# Patient Record
Sex: FEMALE | Race: WHITE | NOT HISPANIC OR LATINO | Employment: OTHER | URBAN - METROPOLITAN AREA
[De-identification: names, ages, dates, MRNs, and addresses within clinical notes are randomized per-mention and may not be internally consistent; named-entity substitution may affect disease eponyms.]

---

## 2017-01-31 ENCOUNTER — OFFICE VISIT (OUTPATIENT)
Dept: MEDICAL GROUP | Facility: PHYSICIAN GROUP | Age: 57
End: 2017-01-31
Payer: COMMERCIAL

## 2017-01-31 VITALS
TEMPERATURE: 99.3 F | OXYGEN SATURATION: 96 % | DIASTOLIC BLOOD PRESSURE: 78 MMHG | SYSTOLIC BLOOD PRESSURE: 98 MMHG | BODY MASS INDEX: 24.77 KG/M2 | RESPIRATION RATE: 12 BRPM | HEART RATE: 96 BPM | WEIGHT: 134.6 LBS | HEIGHT: 62 IN

## 2017-01-31 DIAGNOSIS — R10.12 LEFT UPPER QUADRANT PAIN: ICD-10-CM

## 2017-01-31 DIAGNOSIS — R00.2 HEART PALPITATIONS: ICD-10-CM

## 2017-01-31 DIAGNOSIS — E89.0 POSTOPERATIVE HYPOTHYROIDISM: ICD-10-CM

## 2017-01-31 DIAGNOSIS — Z85.850 HISTORY OF THYROID CANCER: ICD-10-CM

## 2017-01-31 DIAGNOSIS — R25.2 MUSCLE CRAMPS: ICD-10-CM

## 2017-01-31 DIAGNOSIS — R05.3 PERSISTENT COUGH FOR 3 WEEKS OR LONGER: ICD-10-CM

## 2017-01-31 DIAGNOSIS — R07.81 RIB PAIN ON LEFT SIDE: ICD-10-CM

## 2017-01-31 DIAGNOSIS — R07.81 RIB PAIN ON RIGHT SIDE: ICD-10-CM

## 2017-01-31 PROCEDURE — 99204 OFFICE O/P NEW MOD 45 MIN: CPT | Performed by: FAMILY MEDICINE

## 2017-01-31 ASSESSMENT — PATIENT HEALTH QUESTIONNAIRE - PHQ9: CLINICAL INTERPRETATION OF PHQ2 SCORE: 1

## 2017-01-31 NOTE — ASSESSMENT & PLAN NOTE
Patient complains of a dry cough that has been going on for the last 2-1/2 months. She saw her previous primary care provider who gave her an antibiotic. This did not improve the cough. She does feel like it is getting better. Has a history of allergies. Denies heartburn. She has had a normal CXR.

## 2017-01-31 NOTE — ASSESSMENT & PLAN NOTE
Diagnosed with papillary thyroid cancer in 2013. She is s/p total thyroidectomy. Per patient, there were no metastases. She had normal thyroglobulin antibodies last fall. Not following with endocrinology or ENT.

## 2017-01-31 NOTE — MR AVS SNAPSHOT
"        Jordyn Vidal   2017 2:20 PM   Office Visit   MRN: [id number]    Department:  [facility]   Dept Phone:  [phone]    Description:  Female : 1960   Provider:  Bety Harvey M.D.           Reason for Visit     Establish Care left rib pain, below right arm pain, cough x 2months, palpitations while trying to sleep, finger & leg cramps      Allergies as of 2017     Allergen Noted Reactions    Codeine 10/22/2009       GI intolerance    Zoloft 10/22/2009   Nausea      You were diagnosed with     History of thyroid cancer   [559781]       Persistent cough for 3 weeks or longer   [6812161]       Muscle cramps   [136679]       Left upper quadrant pain   [129827]       Heart palpitations   [860761]       Postoperative hypothyroidism   [490269]       Rib pain on left side   [280043]       Rib pain on right side   [958196]         Vital Signs     Blood Pressure Pulse Temperature Respirations Height Weight    98/78 mmHg 96 37.4 °C (99.3 °F) 12 1.562 m (5' 1.5\") 61.054 kg (134 lb 9.6 oz)    Body Mass Index Oxygen Saturation Smoking Status             25.02 kg/m2 96% Never Smoker          Basic Information     Date Of Birth Sex Race Ethnicity Preferred Language    1960 Female Other Non- English      Problem List              ICD-10-CM Priority Class Noted - Resolved    History of thyroid cancer Z85.850   10/19/2013 - Present    Persistent cough for 3 weeks or longer R05   2017 - Present    Muscle cramps R25.2   2017 - Present    Left upper quadrant pain R10.12   2017 - Present    Heart palpitations R00.2   2017 - Present    Postoperative hypothyroidism E89.0   2017 - Present      Health Maintenance        Date Due Completion Dates    IMM DTaP/Tdap/Td Vaccine (1 - Tdap) 1979 ---    COLONOSCOPY 2010 ---    MAMMOGRAM 10/2/2013 10/2/2012, 2011, 3/19/2009, 3/19/2009    IMM INFLUENZA (1) 2016 ---            Current Immunizations     No immunizations on " file.      Below and/or attached are the medications your provider expects you to take. Review all of your home medications and newly ordered medications with your provider and/or pharmacist. Follow medication instructions as directed by your provider and/or pharmacist. Please keep your medication list with you and share with your provider. Update the information when medications are discontinued, doses are changed, or new medications (including over-the-counter products) are added; and carry medication information at all times in the event of emergency situations     Allergies:  CODEINE - (reactions not documented)     ZOLOFT - Nausea               Medications  Valid as of: January 31, 2017 -  3:20 PM    Generic Name Brand Name Tablet Size Instructions for use    ALPRAZolam (Tab) XANAX 0.25 MG Take 0.25 mg by mouth as needed. Takes 0.5 tab        DiphenhydrAMINE HCl (Tab) BENADRYL 25 MG Take 25 mg by mouth every evening.        Levothyroxine Sodium (Tab) SYNTHROID 112 MCG Take 112 mcg by mouth every day.        Loratadine (Tab) CLARITIN 10 MG Take 10 mg by mouth every day.        .                 Medicines prescribed today were sent to:     [facility] #[id number] - [place], [place] - [address]    [address] [place] [address]    Phone: [phone] Fax: [phone]    Open 24 Hours?: No      Medication refill instructions:       If your prescription bottle indicates you have medication refills left, it is not necessary to call your provider’s office. Please contact your pharmacy and they will refill your medication.    If your prescription bottle indicates you do not have any refills left, you may request refills at any time through one of the following ways: The online [employer] system (except Urgent Care), by calling your provider’s office, or by asking your pharmacy to contact your provider’s office with a refill request. Medication refills are processed only during regular business hours and may not be  available until the next business day. Your provider may request additional information or to have a follow-up visit with you prior to refilling your medication.   *Please Note: Medication refills are assigned a new Rx number when refilled electronically. Your pharmacy may indicate that no refills were authorized even though a new prescription for the same medication is available at the pharmacy. Please request the medicine by name with the pharmacy before contacting your provider for a refill.        Your To Do List     Future Labs/Procedures Complete By Expires    AMYLASE  As directed 1/31/2018    CBC WITH DIFFERENTIAL  As directed 2/1/2018    COMP METABOLIC PANEL  As directed 2/1/2018    [id number] (WITH 1-VIEW CXR)  As directed 1/31/2018    LIPASE  As directed 1/31/2018    TSH  As directed 2/1/2018         [employer] Access Code: [email or web address]  Expires: 3/2/2017  9:35 AM    [employer]  A secure, online tool to manage your health information     [employer]’s [employer]® is a secure, online tool that connects you to your personalized health information from the privacy of your home -- day or night - making it very easy for you to manage your healthcare. Once the activation process is completed, you can even access your medical information using the [employer] chapin, which is available for free in the Apple Chapin store or Google Play store.     [employer] provides the following levels of access (as shown below):   My Chart Features   [facility] Doctor [facility]  Urgent  Care Non-[facility]  Primary Care  Doctor   Email your healthcare team securely and privately 24/7 X X X    Manage appointments: schedule your next appointment; view details of past/upcoming appointments X      Request prescription refills. X      View recent personal medical records, including lab and immunizations X X X X   View health record, including health history, allergies, medications X X X X   Read reports about your  outpatient visits, procedures, consult and ER notes X X X X   See your discharge summary, which is a recap of your hospital and/or ER visit that includes your diagnosis, lab results, and care plan. X X       How to register for [employer]:  1. Go to  [email or web address].  2. Click on the Sign Up Now box, which takes you to the New Member Sign Up page. You will need to provide the following information:  a. Enter your [employer] Access Code exactly as it appears at the top of this page. (You will not need to use this code after you’ve completed the sign-up process. If you do not sign up before the expiration date, you must request a new code.)   b. Enter your date of birth.   c. Enter your home email address.   d. Click Submit, and follow the next screen’s instructions.  3. Create a [employer] ID. This will be your [employer] login ID and cannot be changed, so think of one that is secure and easy to remember.  4. Create a [employer] password. You can change your password at any time.  5. Enter your Password Reset Question and Answer. This can be used at a later time if you forget your password.   6. Enter your e-mail address. This allows you to receive e-mail notifications when new information is available in [employer].  7. Click Sign Up. You can now view your health information.    For assistance activating your [employer] account, call [phone]

## 2017-01-31 NOTE — Clinical Note
[facility]  Bety Harvey M.D.  [address]  [place] [address]  Fax: [phone] Authorization for Release/Disclosure of Protected Health Information   Name: JORDYN PORTER : 1960 SSN: [id number]   Address: [address] [place] [address]  [place] [address] Phone:    [phone] (home)    I authorize the entity listed below to release/disclose the PHI below to [facility]/Bety Harvey M.D.   Provider or Entity Name:  Dr. Lisa Kelsey     Address   City, [facility]   Phone:      Fax:     Reason for request: continuity of care   Information to be released:    [  ] LAST COLONOSCOPY, including any PATH REPORT [  ] LAST DEXA  [  ] LAST MAMMOGRAM  [  ] LAST PAP [  ] RETINA EXAM REPORT  [  ] IMMUNIZATION RECORDS  [ x ] Release all info      [  ] Check here and initial the line next to each item to release ALL health information INCLUDING  _____ Care and treatment for drug and / or alcohol abuse  _____ HIV testing, infection status, or AIDS  _____ Genetic Testing    DATES OF SERVICE OR TIME PERIOD TO BE DISCLOSED: _____________  I understand and acknowledge that:  * This Authorization may be revoked at any time by you in writing, except if your health information has already been used or disclosed.  * Your health information that will be used or disclosed as a result of you signing this authorization could be re-disclosed by the recipient. If this occurs, your re-disclosed health information may no longer be protected by State or Federal laws.  * You may refuse to sign this Authorization. Your refusal will not affect your ability to obtain treatment.  * This Authorization becomes effective upon signing and will  on (date) __________. If no date is indicated, this Authorization will  one (1) year from the signature date.    Name: Jordyn Porter    Signature:     Date: 2017

## 2017-01-31 NOTE — Clinical Note
[facility]  Bety Harvey M.D.  [address]  [place] [address]  Fax: [phone] Authorization for Release/Disclosure of Protected Health Information   Name: JORDYN VIDAL : 1960 SSN: [id number]   Address: [address] [place] [address]  [place] [address] Phone:    [phone] (home)    I authorize the entity listed below to release/disclose the PHI below to Pending sale to [facility]/Bety Harvey M.D.   Provider or Entity Name:  [facility]     Address   City, State, [facility]   Phone:      Fax:     Reason for request: continuity of care   Information to be released:    [ x ] LAST COLONOSCOPY, including any PATH REPORT [  ] LAST DEXA  [  ] LAST MAMMOGRAM  [  ] LAST PAP [  ] RETINA EXAM REPORT  [  ] IMMUNIZATION RECORDS  [  ] Release all info      [  ] Check here and initial the line next to each item to release ALL health information INCLUDING  _____ Care and treatment for drug and / or alcohol abuse  _____ HIV testing, infection status, or AIDS  _____ Genetic Testing    DATES OF SERVICE OR TIME PERIOD TO BE DISCLOSED: _____________  I understand and acknowledge that:  * This Authorization may be revoked at any time by you in writing, except if your health information has already been used or disclosed.  * Your health information that will be used or disclosed as a result of you signing this authorization could be re-disclosed by the recipient. If this occurs, your re-disclosed health information may no longer be protected by State or Federal laws.  * You may refuse to sign this Authorization. Your refusal will not affect your ability to obtain treatment.  * This Authorization becomes effective upon signing and will  on (date) __________. If no date is indicated, this Authorization will  one (1) year from the signature date.    Name: Jordyn Vidal    Signature:     Date: 2017

## 2017-01-31 NOTE — Clinical Note
[facility]  Bety Harvey M.D.  [address]  [place] [address]  Fax: [phone] Authorization for Release/Disclosure of Protected Health Information   Name: JORDYN PORTER : 1960 SSN: [id number]   Address: [address] [place] [address]  [place] [address] Phone:    [phone] (home)    I authorize the entity listed below to release/disclose the PHI below to [facility]/Bety Harvey M.D.   Provider or Entity Name:  [facility]  Dr. Arango     Address   [facility], [facility]   Phone:      Fax:     Reason for request: continuity of care   Information to be released:    [  ] LAST COLONOSCOPY, including any PATH REPORT [  ] LAST DEXA  [  ] LAST MAMMOGRAM  [  ] LAST PAP [  ] RETINA EXAM REPORT  [  ] IMMUNIZATION RECORDS  [ x ] Release all info      [  ] Check here and initial the line next to each item to release ALL health information INCLUDING  _____ Care and treatment for drug and / or alcohol abuse  _____ HIV testing, infection status, or AIDS  _____ Genetic Testing    DATES OF SERVICE OR TIME PERIOD TO BE DISCLOSED: _____________  I understand and acknowledge that:  * This Authorization may be revoked at any time by you in writing, except if your health information has already been used or disclosed.  * Your health information that will be used or disclosed as a result of you signing this authorization could be re-disclosed by the recipient. If this occurs, your re-disclosed health information may no longer be protected by State or Federal laws.  * You may refuse to sign this Authorization. Your refusal will not affect your ability to obtain treatment.  * This Authorization becomes effective upon signing and will  on (date) __________. If no date is indicated, this Authorization will  one (1) year from the signature date.    Name: Jordyn Potrer    Signature:     Date: 2017

## 2017-02-01 NOTE — ASSESSMENT & PLAN NOTE
"Due for TSH recheck. She has been on levothyroxine 112mcg for many years. She says that she recently went back to taking 7 pills/week after she gained weight. Her previous endocrinologist had recommended 6 pills/week due to \"low thyroid level.\" Denies palpitations, skin changes, temperature intolerance, changes in bowel habits.  "

## 2017-02-01 NOTE — ASSESSMENT & PLAN NOTE
"Patient complains of left upper abdominal pain that has been occuring on and off over the last few months. It is located \"underneath the rib\". No radiation. Feels sharp at times, but mostly a dull achy pain. No injury or trauma. Patient also reports having a similar pain near her right arm.  "

## 2017-02-01 NOTE — PROGRESS NOTES
"Jordyn Vidal is a 56 y.o. female here to establish care and discuss cough, muscle cramps and left-sided abdominal pain.    HPI:  Jordyn is a pleasant 56-year-old female here to establish. Her previous PCP was Dr. Arango. She was born in [place].    History of thyroid cancer  Diagnosed with papillary thyroid cancer in 2013. She is s/p total thyroidectomy. Per patient, there were no metastases. She had normal thyroglobulin antibodies last fall. Not following with endocrinology or ENT.    Persistent cough for 3 weeks or longer  Patient complains of a dry cough that has been going on for the last 2-1/2 months. She saw her previous primary care provider who gave her an antibiotic. This did not improve the cough. She does feel like it is getting better. Has a history of allergies. Denies heartburn. She has had a normal CXR.    Muscle cramps  Patient has intermittent cramps in her lower extremities. They started several months ago. Occur throughout the day. She feels like she is drinking enough water. Not on any new medications. No numbness, tingling or weakness.    Postoperative hypothyroidism  Due for TSH recheck. She has been on levothyroxine 112mcg for many years. She says that she recently went back to taking 7 pills/week after she gained weight. Her previous endocrinologist had recommended 6 pills/week due to \"low thyroid level.\" Denies palpitations, skin changes, temperature intolerance, changes in bowel habits.    Left upper quadrant pain  Patient complains of left upper abdominal pain that has been occuring on and off over the last few months. It is located \"underneath the rib\". No radiation. Feels sharp at times, but mostly a dull achy pain. No injury or trauma. Patient also reports having a similar pain near her right arm. Appears to also be rib pain.    Current medicines (including changes today)  Current Outpatient Prescriptions   Medication Sig Dispense Refill   • levothyroxine (SYNTHROID) 112 MCG TABS Take 112 " "mcg by mouth every day.     • diphenhydrAMINE (BENADRYL) 25 MG TABS Take 25 mg by mouth every evening.     • loratadine (CLARITIN) 10 MG TABS Take 10 mg by mouth every day.     • alprazolam (XANAX) 0.25 MG TABS Take 0.25 mg by mouth as needed. Takes 0.5 tab       No current facility-administered medications for this visit.     She  has a past medical history of Cancer (CMS-HCC) (10/2013); Anesthesia; Unspecified disorder of thyroid (); Indigestion; Anxiety; and Allergy.  She  has past surgical history that includes tonsillectomy; thyroidectomy total (10/19/2013); node dissection (10/19/2013); esophageal motility or manometry (2014); abdominal hysterectomy total (); and liposuction (2014).  Social History   Substance Use Topics   • Smoking status: Never Smoker    • Smokeless tobacco: Never Used   • Alcohol Use: Yes      Comment: 0-1 drinks a week, no binge-drinking     Family History   Problem Relation Age of Onset   • Cancer Father      Family Status   Relation Status Death Age   • Mother Alive    • Father     • Sister Alive    • Brother Alive    • Sister Alive      ROS  Constitutional: Negative for fever, chills and malaise/fatigue.   HENT: Negative for congestion.    Eyes: Negative for pain.   Respiratory: Negative for cough and shortness of breath.    Cardiovascular: Negative for leg swelling. Patient reports having intermittent heart palpitations.  Gastrointestinal: Negative for nausea, vomiting, abdominal pain and diarrhea.   Genitourinary: Negative for dysuria and hematuria.   Skin: Negative for rash.   Neurological: Negative for dizziness, focal weakness and headaches.   Endo/Heme/Allergies: Does not bruise/bleed easily.   Psychiatric/Behavioral: Negative for depression.  The patient is not nervous/anxious.       Objective:     Physical Exam:  Blood pressure 98/78, pulse 96, temperature 37.4 °C (99.3 °F), resp. rate 12, height 1.562 m (5' 1.5\"), weight 61.054 kg (134 lb 9.6 oz), SpO2 " 96 %. Body mass index is 25.02 kg/(m^2).  Constitutional: Alert, no distress.  Skin: Warm, dry, good turgor, no rashes in visible areas.  Eye: Equal, round and reactive, conjunctiva clear, lids normal.  ENMT: TM's clear bilaterally, lips without lesions, good dentition, oropharynx clear.  Neck: Trachea midline, no masses, healed thyroidectomy scar. No cervical or supraclavicular lymphadenopathy.  Respiratory: Unlabored respiratory effort, lungs clear to auscultation, no wheezes, no ronchi.  Cardiovascular: Normal S1, S2, no murmur, no edema.  MSK: Normal gait. DEGROOT with full ROM. Tender to palpation over left anterior 9-10th ribs and right posterior 4-5th ribs.  Abdomen: Soft, non-tender, non-distended, no masses, no hepatosplenomegaly.  Psych: Alert and oriented x3, normal affect and mood.    EKG interpretation by me: NSR. HR is 80. Axis is normal. No ST or QRS morphologic changes. No T-wave inversions. NJ and QT intervals are normal. Quality of EKG is good.      Assessment and Plan:     1. History of thyroid cancer  2. Postoperative hypothyroidism  Patient increased her thyroid supplement recently, recheck TSH. Will plan to check thyroglobulin antibodies yearly.  - TSH; Future    3. Persistent cough for 3 weeks or longer  Likely viral versus allergic rhinitis. Patient reports she has had a normal CXR recently. Evaluate with labwork. Recommended nasal saline and steroid sprays.  - CBC WITH DIFFERENTIAL; Future    4. Muscle cramps  Essentially normal exam. Labwork ordered. Recommended adequate hydration and stretching.  - CBC WITH DIFFERENTIAL; Future  - COMP METABOLIC PANEL; Future    5. Left upper quadrant pain  7. Rib pain on left side  8. Rib pain on right side  Musculoskeletal type pain on exam. Labwork and x-ray ordered to further evaluate. Depending on results, may need further imaging.  - CBC WITH DIFFERENTIAL; Future  - COMP METABOLIC PANEL; Future  - AMYLASE; Future  - LIPASE; Future  - [id number]  (WITH 1-VIEW CXR); Future    6. Heart palpitations  Reassuring EKG. Check TSH. May consider Holter monitor.    Records requested from previous PCP.  Followup: Return in about 4 weeks (around 2/28/2017) for f/u rib pain, cough, palpitations, short.         PLEASE NOTE: This dictation was created using voice recognition software. I have made every reasonable attempt to correct obvious errors, but I expect that there are errors of grammar and possibly content that I did not discover before finalizing the note.

## 2017-02-01 NOTE — ASSESSMENT & PLAN NOTE
Patient has intermittent cramps in her lower extremities. They started several months ago. Occur throughout the day. She feels like she is drinking enough water. Not on any new medications. No numbness, tingling or weakness.

## 2017-02-09 ENCOUNTER — HOSPITAL ENCOUNTER (OUTPATIENT)
Dept: LAB | Facility: MEDICAL CENTER | Age: 57
End: 2017-02-09
Attending: FAMILY MEDICINE
Payer: COMMERCIAL

## 2017-02-09 DIAGNOSIS — Z85.850 HISTORY OF THYROID CANCER: ICD-10-CM

## 2017-02-09 DIAGNOSIS — E89.0 POSTOPERATIVE HYPOTHYROIDISM: ICD-10-CM

## 2017-02-09 DIAGNOSIS — R10.12 LEFT UPPER QUADRANT PAIN: ICD-10-CM

## 2017-02-09 DIAGNOSIS — R05.3 PERSISTENT COUGH FOR 3 WEEKS OR LONGER: ICD-10-CM

## 2017-02-09 DIAGNOSIS — R25.2 MUSCLE CRAMPS: ICD-10-CM

## 2017-02-09 LAB
ALBUMIN SERPL BCP-MCNC: 4.4 G/DL (ref 3.2–4.9)
ALBUMIN/GLOB SERPL: 1.3 G/DL
ALP SERPL-CCNC: 85 U/L (ref 30–99)
ALT SERPL-CCNC: 18 U/L (ref 2–50)
AMYLASE SERPL-CCNC: 42 U/L (ref 20–103)
ANION GAP SERPL CALC-SCNC: 8 MMOL/L (ref 0–11.9)
AST SERPL-CCNC: 18 U/L (ref 12–45)
BASOPHILS # BLD AUTO: 0.02 K/UL (ref 0–0.12)
BASOPHILS NFR BLD AUTO: 0.3 % (ref 0–1.8)
BILIRUB SERPL-MCNC: 0.5 MG/DL (ref 0.1–1.5)
BUN SERPL-MCNC: 21 MG/DL (ref 8–22)
CALCIUM SERPL-MCNC: 10.1 MG/DL (ref 8.5–10.5)
CHLORIDE SERPL-SCNC: 103 MMOL/L (ref 96–112)
CO2 SERPL-SCNC: 28 MMOL/L (ref 20–33)
CREAT SERPL-MCNC: 0.63 MG/DL (ref 0.5–1.4)
EOSINOPHIL # BLD: 0.07 K/UL (ref 0–0.51)
EOSINOPHIL NFR BLD AUTO: 1.1 % (ref 0–6.9)
ERYTHROCYTE [DISTWIDTH] IN BLOOD BY AUTOMATED COUNT: 44.3 FL (ref 35.9–50)
GLOBULIN SER CALC-MCNC: 3.3 G/DL (ref 1.9–3.5)
GLUCOSE SERPL-MCNC: 86 MG/DL (ref 65–99)
HCT VFR BLD AUTO: 40.4 % (ref 37–47)
HGB BLD-MCNC: 12.9 G/DL (ref 12–16)
IMM GRANULOCYTES # BLD AUTO: 0.01 K/UL (ref 0–0.11)
IMM GRANULOCYTES NFR BLD AUTO: 0.2 % (ref 0–0.9)
LIPASE SERPL-CCNC: 26 U/L (ref 11–82)
LYMPHOCYTES # BLD: 1.6 K/UL (ref 1–4.8)
LYMPHOCYTES NFR BLD AUTO: 26 % (ref 22–41)
MCH RBC QN AUTO: 29.9 PG (ref 27–33)
MCHC RBC AUTO-ENTMCNC: 31.9 G/DL (ref 33.6–35)
MCV RBC AUTO: 93.7 FL (ref 81.4–97.8)
MONOCYTES # BLD: 0.38 K/UL (ref 0–0.85)
MONOCYTES NFR BLD AUTO: 6.2 % (ref 0–13.4)
NEUTROPHILS # BLD: 4.07 K/UL (ref 2–7.15)
NEUTROPHILS NFR BLD AUTO: 66.2 % (ref 44–72)
NRBC # BLD AUTO: 0 K/UL
NRBC BLD-RTO: 0 /100 WBC
PLATELET # BLD AUTO: 291 K/UL (ref 164–446)
PMV BLD AUTO: 10.6 FL (ref 9–12.9)
POTASSIUM SERPL-SCNC: 4.4 MMOL/L (ref 3.6–5.5)
PROT SERPL-MCNC: 7.7 G/DL (ref 6–8.2)
RBC # BLD AUTO: 4.31 M/UL (ref 4.2–5.4)
SODIUM SERPL-SCNC: 139 MMOL/L (ref 135–145)
TSH SERPL DL<=0.005 MIU/L-ACNC: 5.33 UIU/ML (ref 0.3–3.7)
WBC # BLD AUTO: 6.2 K/UL (ref 4.8–10.8)

## 2017-02-09 PROCEDURE — 85025 COMPLETE CBC W/AUTO DIFF WBC: CPT

## 2017-02-09 PROCEDURE — 83690 ASSAY OF LIPASE: CPT

## 2017-02-09 PROCEDURE — 36415 COLL VENOUS BLD VENIPUNCTURE: CPT

## 2017-02-09 PROCEDURE — 80053 COMPREHEN METABOLIC PANEL: CPT

## 2017-02-09 PROCEDURE — 84443 ASSAY THYROID STIM HORMONE: CPT

## 2017-02-09 PROCEDURE — 82150 ASSAY OF AMYLASE: CPT

## 2017-02-10 ENCOUNTER — TELEPHONE (OUTPATIENT)
Dept: MEDICAL GROUP | Facility: PHYSICIAN GROUP | Age: 57
End: 2017-02-10

## 2017-02-10 DIAGNOSIS — E89.0 POSTOPERATIVE HYPOTHYROIDISM: ICD-10-CM

## 2017-02-10 RX ORDER — LEVOTHYROXINE SODIUM 0.12 MG/1
125 TABLET ORAL
Qty: 90 TAB | Refills: 3 | Status: SHIPPED
Start: 2017-02-10 | End: 2017-04-28 | Stop reason: SDUPTHER

## 2017-02-10 RX ORDER — LEVOTHYROXINE SODIUM 0.12 MG/1
125 TABLET ORAL
Qty: 30 TAB | Refills: 2 | Status: SHIPPED | OUTPATIENT
Start: 2017-02-10 | End: 2017-02-10 | Stop reason: SDUPTHER

## 2017-02-10 NOTE — TELEPHONE ENCOUNTER
Please let patient know I reviewed her labwork and the only abnormality was an elevated TSH. I recommend increasing her levothyroxine to 125mcg. She will take this daily. I have sent over a prescription to her pharmacy. Also, I ordered a repeat TSH to be done in 6 weeks. She can go to any [facility] lab to have this done at the end of March.  Bety Harvey M.D.

## 2017-02-10 NOTE — TELEPHONE ENCOUNTER
Can you please print out the current Levothyroxine RX  As she gets it from [facility] and needs to mail it to them.  Thank you

## 2017-04-27 ENCOUNTER — HOSPITAL ENCOUNTER (OUTPATIENT)
Dept: LAB | Facility: MEDICAL CENTER | Age: 57
End: 2017-04-27
Attending: FAMILY MEDICINE
Payer: COMMERCIAL

## 2017-04-27 ENCOUNTER — TELEPHONE (OUTPATIENT)
Dept: MEDICAL GROUP | Facility: PHYSICIAN GROUP | Age: 57
End: 2017-04-27

## 2017-04-27 DIAGNOSIS — E89.0 POSTOPERATIVE HYPOTHYROIDISM: ICD-10-CM

## 2017-04-27 LAB — TSH SERPL DL<=0.005 MIU/L-ACNC: 0.21 UIU/ML (ref 0.3–3.7)

## 2017-04-27 PROCEDURE — 36415 COLL VENOUS BLD VENIPUNCTURE: CPT

## 2017-04-27 PROCEDURE — 84443 ASSAY THYROID STIM HORMONE: CPT

## 2017-04-27 NOTE — TELEPHONE ENCOUNTER
ESTABLISHED PATIENT PRE-VISIT PLANNING     Note: Patient will not be contacted if there is no indication to call.     1.  Reviewed note from last office visit with PCP and/or other med group provider: Yes    2.  If any orders were placed at last visit, do we have Results/Consult Notes?        •  Labs - Labs ordered, completed and results are in chart.       •  Imaging - Imaging ordered, completed and results are in chart.       •  Referrals - No referrals were ordered at last office visit.    3.  Immunizations were updated in Epic using WebIZ?: No WebIZ record       •  Web Iz Recommendations: No Documentation in Web IZ    4.  Patient is due for the following Health Maintenance Topics:   Health Maintenance Due   Topic Date Due   • IMM DTaP/Tdap/Td Vaccine (1 - Tdap) 06/02/1979   • COLONOSCOPY  06/02/2010   • MAMMOGRAM  10/02/2013       - Patient has completed Colonoscopy/FIT and Mammogram and HMR Letter(s) faxed to: [facility] Records & to [facility] for last colonoscopy    5.  Patient was informed to arrive 15 min prior to their scheduled appointment and bring in their medication bottles.

## 2017-04-28 ENCOUNTER — OFFICE VISIT (OUTPATIENT)
Dept: MEDICAL GROUP | Facility: PHYSICIAN GROUP | Age: 57
End: 2017-04-28
Payer: COMMERCIAL

## 2017-04-28 VITALS
TEMPERATURE: 98.8 F | HEIGHT: 62 IN | WEIGHT: 135.36 LBS | OXYGEN SATURATION: 98 % | RESPIRATION RATE: 12 BRPM | SYSTOLIC BLOOD PRESSURE: 92 MMHG | HEART RATE: 98 BPM | BODY MASS INDEX: 24.91 KG/M2 | DIASTOLIC BLOOD PRESSURE: 62 MMHG

## 2017-04-28 DIAGNOSIS — E89.0 POSTOPERATIVE HYPOTHYROIDISM: ICD-10-CM

## 2017-04-28 DIAGNOSIS — Z12.39 BREAST CANCER SCREENING: ICD-10-CM

## 2017-04-28 DIAGNOSIS — Z85.850 HISTORY OF THYROID CANCER: ICD-10-CM

## 2017-04-28 DIAGNOSIS — R25.2 NOCTURNAL FOOT CRAMPS: ICD-10-CM

## 2017-04-28 DIAGNOSIS — T14.8XXA CONTUSION OF BONE: ICD-10-CM

## 2017-04-28 PROBLEM — R05.3 PERSISTENT COUGH FOR 3 WEEKS OR LONGER: Status: RESOLVED | Noted: 2017-01-31 | Resolved: 2017-04-28

## 2017-04-28 PROBLEM — R10.12 LEFT UPPER QUADRANT PAIN: Status: RESOLVED | Noted: 2017-01-31 | Resolved: 2017-04-28

## 2017-04-28 PROBLEM — R00.2 HEART PALPITATIONS: Status: RESOLVED | Noted: 2017-01-31 | Resolved: 2017-04-28

## 2017-04-28 PROCEDURE — 99214 OFFICE O/P EST MOD 30 MIN: CPT | Performed by: FAMILY MEDICINE

## 2017-04-28 RX ORDER — LEVOTHYROXINE SODIUM 0.12 MG/1
125 TABLET ORAL
Qty: 90 TAB | Refills: 3 | Status: SHIPPED | OUTPATIENT
Start: 2017-04-28 | End: 2017-11-17 | Stop reason: SDUPTHER

## 2017-04-28 NOTE — ASSESSMENT & PLAN NOTE
"Patient continues to have intermittent muscle cramps in her feet. Mainly occurring at night. Last night she tells me she had \"6 attacks.\" Rarely, she will have them in her hands. She started taking a multivitamin but has not noticed any benefit. She is worried that she may have a neurological disorder.  "

## 2017-04-28 NOTE — ASSESSMENT & PLAN NOTE
Labwork reviewed and TSH is slightly suppressed. Taking levothyroxine 125mcg as directed. Denies palpitations, skin changes, temperature intolerance, changes in bowel habits.    Results for LOLITA PORTER (MRN [id number]) as of 4/28/2017 13:18   Ref. Range 2/9/2017 10:44 4/27/2017 10:08   TSH Latest Ref Range: 0.300-3.700 uIU/mL 5.330 (H) 0.210 (L)

## 2017-04-28 NOTE — PROGRESS NOTES
"Subjective:   Jordyn Vidal is a 56 y.o. female here today for follow-up hypothyroidism and lab results.    Postoperative hypothyroidism  Labwork reviewed and TSH is slightly suppressed. Taking levothyroxine 125mcg as directed. Denies palpitations, skin changes, temperature intolerance, changes in bowel habits.    Results for JORDYN VIDAL (MRN [id number]) as of 4/28/2017 13:18   Ref. Range 2/9/2017 10:44 4/27/2017 10:08   TSH Latest Ref Range: 0.300-3.700 uIU/mL 5.330 (H) 0.210 (L)     Nocturnal foot cramps  Patient continues to have intermittent muscle cramps in her feet. Mainly occurring at night. Last night she tells me she had \"6 attacks.\" Rarely, she will have them in her hands. She started taking a multivitamin but has not noticed any benefit. She is worried that she may have a neurological disorder.    Contusion of bone  Saw her chiropractor couple of weeks ago. Patient tells me that he did a maneuver where he moved her knee quickly into her chest. She had some pain in the right upper part of her chest. Also noticed a small \"lump.\" She did follow up with him about a week later and had an x-ray done which was normal per patient report. She continues to have some minor pain in the area. It worsens when she moves her upper body.     Current medicines (including changes today)  Current Outpatient Prescriptions   Medication Sig Dispense Refill   • levothyroxine (SYNTHROID) 125 MCG Tab Take 1 Tab by mouth Every morning on an empty stomach. 90 Tab 3   • diphenhydrAMINE (BENADRYL) 25 MG TABS Take 25 mg by mouth every evening.     • loratadine (CLARITIN) 10 MG TABS Take 10 mg by mouth every day.     • alprazolam (XANAX) 0.25 MG TABS Take 0.25 mg by mouth as needed. Takes 0.5 tab       No current facility-administered medications for this visit.     She  has a past medical history of Cancer (CMS-[facility]) (10/2013); Anesthesia; Unspecified disorder of thyroid (2013); Indigestion; Anxiety; and Allergy.    ROS   See above. No " "chest pain, no shortness of breath, no abdominal pain.     Objective:     Physical Exam:  Blood pressure 92/62, pulse 98, temperature 37.1 °C (98.8 °F), resp. rate 12, height 1.562 m (5' 1.5\"), weight 61.4 kg (135 lb 5.8 oz), SpO2 98 %. Body mass index is 25.17 kg/(m^2).   Constitutional: Alert, no distress.  Skin: Warm, dry, good turgor, no rashes in visible areas.  Eye: Conjunctiva clear, lids normal.  ENMT: Lips without lesions, good dentition, oropharynx clear.  Neck: Trachea midline, no masses, no thyromegaly. No cervical or supraclavicular lymphadenopathy.  Respiratory: Unlabored respiratory effort, lungs clear to auscultation, no wheezes, no ronchi.  Cardiovascular: Normal S1, S2, no murmur, no edema. ~Quarter-sized area of mild swelling over left upper chest that is tender to touch.  Abdomen: Soft, non-tender, no masses, no hepatosplenomegaly.  MSK: Normal gait. DEGROOT with full ROM.  Neuro: Alert and oriented x 3. DTRs 2+ and symmetric. No cranial nerve deficit. Strength and sensation intact. Negative Rhomberg. No dysdiadochokinesia.   Psych: Alert and oriented x3, normal affect and mood.    Assessment and Plan:     1. Postoperative hypothyroidism  2. History of thyroid cancer  TSH slightly below target range. Continue thyroid medication. Recheck TSH in 2 months to see if dose adjustment is needed. Instructed patient to take on empty stomach with glass of water, 30 minutes prior to food or other medications.  - levothyroxine (SYNTHROID) 125 MCG Tab; Take 1 Tab by mouth Every morning on an empty stomach.  Dispense: 90 Tab; Refill: 3  - TSH; Future    3. Nocturnal foot cramps  Unremarkable workup thus far. Normal neuro exam. Provided reassurance. Encourage patient to take vitamin B complex and magnesium.    4. Contusion of bone  Noted on exam. Advised patient to take anti-inflammatories and use heat as needed.    5. Breast cancer screening  Mammogram ordered.  - MA-MAMMO SCREENING BILAT W/AGUEDA W/O CAD; " Future    Followup: Return in about 3 months (around 7/28/2017) for f/u leg cramps, short.         PLEASE NOTE: This dictation was created using voice recognition software. I have made every reasonable attempt to correct obvious errors, but I expect that there are errors of grammar and possibly content that I did not discover before finalizing the note.

## 2017-04-28 NOTE — Clinical Note
[facility] [facility]  Bety Harvey M.D.  [address]  [place] [address]  Fax: [phone]   Authorization for Release/Disclosure of   Protected Health Information   Name: JORDYN VIDAL : 1960 SSN: [id number]   Address: [address] [place] [address]  [place] [address] Phone:    [phone] (home)    I authorize the entity listed below to release/disclose the PHI below to:   [facility]/Bety Harvey M.D. and Bety Harvey M.D.   Provider or Entity Name:  [facility]   Address   City, State, [facility]   Phone:      Fax:     Reason for request: continuity of care   Information to be released:    [  ] LAST COLONOSCOPY,  including any PATH REPORT and follow-up  [  ] LAST FIT/COLOGUARD RESULT [  ] LAST DEXA  [  ] LAST MAMMOGRAM  [  ] LAST PAP  [  ] LAST LABS [  ] RETINA EXAM REPORT  [  ] IMMUNIZATION RECORDS  [  ] Release all info      [  ] Check here and initial the line next to each item to release ALL health information INCLUDING  _____ Care and treatment for drug and / or alcohol abuse  _____ HIV testing, infection status, or AIDS  _____ Genetic Testing    DATES OF SERVICE OR TIME PERIOD TO BE DISCLOSED: _____________  I understand and acknowledge that:  * This Authorization may be revoked at any time by you in writing, except if your health information has already been used or disclosed.  * Your health information that will be used or disclosed as a result of you signing this authorization could be re-disclosed by the recipient. If this occurs, your re-disclosed health information may no longer be protected by State or Federal laws.  * You may refuse to sign this Authorization. Your refusal will not affect your ability to obtain treatment.  * This Authorization becomes effective upon signing and will  on (date) __________.      If no date is indicated, this Authorization will  one (1) year from the signature date.    Name: Jordyn Vidal    Signature:   Date:     2017       PLEASE FAX  REQUESTED RECORDS BACK TO: [phone]

## 2017-04-28 NOTE — ASSESSMENT & PLAN NOTE
"Saw her chiropractor couple of weeks ago. Patient tells me that he did a maneuver where he moved her knee quickly into her chest. She had some pain in the right upper part of her chest. Also noticed a small \"lump.\" She did follow up with him about a week later and had an x-ray done which was normal per patient report. She continues to have some minor pain in the area. It worsens when she moves her upper body.  "

## 2017-04-28 NOTE — MR AVS SNAPSHOT
"        Jordyn Vidal   2017 1:00 PM   Office Visit   MRN: [id number]    Department:  [facility]   Dept Phone:  [phone]    Description:  Female : 1960   Provider:  Bety Harvey M.D.           Reason for Visit     Results lab     Referral Needed Mammogram    Lump right side of upper chest - xray done    Foot Problem foot cramps      Allergies as of 2017     Allergen Noted Reactions    Codeine 10/22/2009       GI intolerance    Zoloft 10/22/2009   Nausea      You were diagnosed with     Postoperative hypothyroidism   [828635]       History of thyroid cancer   [573187]       Nocturnal foot cramps   [377126]       Contusion of bone   [324554]       Breast cancer screening   [779146]         Vital Signs     Blood Pressure Pulse Temperature Respirations Height Weight    92/62 mmHg 98 37.1 °C (98.8 °F) 12 1.562 m (5' 1.5\") 61.4 kg (135 lb 5.8 oz)    Body Mass Index Oxygen Saturation Smoking Status             25.17 kg/m2 98% Never Smoker          Basic Information     Date Of Birth Sex Race Ethnicity Preferred Language    1960 Female Other Non- English      Your appointments     2017  8:00 AM   Established Patient with Bety Harvey M.D.   [facility] (--)    [address]  [address]  [facility] [address] [phone]           You will be receiving a confirmation call a few days before your appointment from our automated call confirmation system.              Problem List              ICD-10-CM Priority Class Noted - Resolved    History of thyroid cancer Z85.850   10/19/2013 - Present    Postoperative hypothyroidism E89.0   2017 - Present    Nocturnal foot cramps R25.2   2017 - Present    Contusion of bone T14.8   2017 - Present      Health Maintenance        Date Due Completion Dates    COLONOSCOPY 2010 ---    MAMMOGRAM 10/2/2013 10/2/2012, 2011, 3/19/2009, 3/19/2009    IMM DTaP/Tdap/Td Vaccine (1 - Tdap) 2018 (Originally " 6/2/1979) ---            Current Immunizations     No immunizations on file.      Below and/or attached are the medications your provider expects you to take. Review all of your home medications and newly ordered medications with your provider and/or pharmacist. Follow medication instructions as directed by your provider and/or pharmacist. Please keep your medication list with you and share with your provider. Update the information when medications are discontinued, doses are changed, or new medications (including over-the-counter products) are added; and carry medication information at all times in the event of emergency situations     Allergies:  CODEINE - (reactions not documented)     ZOLOFT - Nausea               Medications  Valid as of: April 28, 2017 -  1:19 PM    Generic Name Brand Name Tablet Size Instructions for use    ALPRAZolam (Tab) XANAX 0.25 MG Take 0.25 mg by mouth as needed. Takes 0.5 tab        DiphenhydrAMINE HCl (Tab) BENADRYL 25 MG Take 25 mg by mouth every evening.        Levothyroxine Sodium (Tab) SYNTHROID 125 MCG Take 1 Tab by mouth Every morning on an empty stomach.        Loratadine (Tab) CLARITIN 10 MG Take 10 mg by mouth every day.        .                 Medicines prescribed today were sent to:     [id number] - [place], [place] - [address]    [address] [place] [address]    Phone: [phone] Fax: [phone]    Open 24 Hours?: No      Medication refill instructions:       If your prescription bottle indicates you have medication refills left, it is not necessary to call your provider’s office. Please contact your pharmacy and they will refill your medication.    If your prescription bottle indicates you do not have any refills left, you may request refills at any time through one of the following ways: The online En[employer] system (except Urgent Care), by calling your provider’s office, or by asking your pharmacy to contact your provider’s office with a refill  request. Medication refills are processed only during regular business hours and may not be available until the next business day. Your provider may request additional information or to have a follow-up visit with you prior to refilling your medication.   *Please Note: Medication refills are assigned a new Rx number when refilled electronically. Your pharmacy may indicate that no refills were authorized even though a new prescription for the same medication is available at the pharmacy. Please request the medicine by name with the pharmacy before contacting your provider for a refill.        Your To Do List     Future Labs/Procedures Complete By Expires    MA-MAMMO SCREENING BILAT W/AGUEDA W/O CAD  As directed 4/28/2018    TSH  As directed 4/29/2018      Instructions    Muscle Cramps and Spasms  Muscle cramps and spasms are when muscles tighten by themselves. They usually get better within minutes. Muscle cramps are painful. They are usually stronger and last longer than muscle spasms. Muscle spasms may or may not be painful. They can last a few seconds or much longer.  HOME CARE  · Drink enough fluid to keep your pee (urine) clear or pale yellow.  · Massage, stretch, and relax the muscle.  · Use a warm towel, heating pad, or warm shower water on tight muscles.  · Place ice on the muscle if it is tender or in pain.  ¨ Put ice in a plastic bag.  ¨ Place a towel between your skin and the bag.  ¨ Leave the ice on for 15-20 minutes, 03-04 times a day.  · Only take medicine as told by your doctor.  GET HELP RIGHT AWAY IF:   Your cramps or spasms get worse, happen more often, or do not get better with time.  MAKE SURE YOU:  · Understand these instructions.  · Will watch your condition.  · Will get help right away if you are not doing well or get worse.     This information is not intended to replace advice given to you by your health care provider. Make sure you discuss any questions you have with your health care  provider.     Document Released: 11/30/2009 Document Revised: 04/14/2014 Document Reviewed: 12/04/2013  [employer] Interactive Patient Education ©2016 Elsevier Inc.            Fortscale Access Code: [email or web address]  Expires: 5/27/2017 10:04 AM    [employer]  A secure, online tool to manage your health information     [employer]s Fortscale® is a secure, online tool that connects you to your personalized health information from the privacy of your home -- day or night - making it very easy for you to manage your healthcare. Once the activation process is completed, you can even access your medical information using the Fortscale chapin, which is available for free in the Apple Chapin store or Google Play store.     Fortscale provides the following levels of access (as shown below):   My Chart Features   [facility] Doctor [facility]  Urgent  Care Non-[facility]  Primary Care  Doctor   Email your healthcare team securely and privately 24/7 X X X    Manage appointments: schedule your next appointment; view details of past/upcoming appointments X      Request prescription refills. X      View recent personal medical records, including lab and immunizations X X X X   View health record, including health history, allergies, medications X X X X   Read reports about your outpatient visits, procedures, consult and ER notes X X X X   See your discharge summary, which is a recap of your hospital and/or ER visit that includes your diagnosis, lab results, and care plan. X X       How to register for Fortscale:  1. Go to  [email or web address].  2. Click on the Sign Up Now box, which takes you to the New Member Sign Up page. You will need to provide the following information:  a. Enter your Fortscale Access Code exactly as it appears at the top of this page. (You will not need to use this code after you’ve completed the sign-up process. If you do not sign up before the expiration date, you must request a new code.)    b. Enter your date of birth.   c. Enter your home email address.   d. Click Submit, and follow the next screen’s instructions.  3. Create a Parse ID. This will be your Parse login ID and cannot be changed, so think of one that is secure and easy to remember.  4. Create a iRatest password. You can change your password at any time.  5. Enter your Password Reset Question and Answer. This can be used at a later time if you forget your password.   6. Enter your e-mail address. This allows you to receive e-mail notifications when new information is available in Parse.  7. Click Sign Up. You can now view your health information.    For assistance activating your Parse account, call [phone]

## 2017-04-28 NOTE — PATIENT INSTRUCTIONS
Muscle Cramps and Spasms  Muscle cramps and spasms are when muscles tighten by themselves. They usually get better within minutes. Muscle cramps are painful. They are usually stronger and last longer than muscle spasms. Muscle spasms may or may not be painful. They can last a few seconds or much longer.  HOME CARE  · Drink enough fluid to keep your pee (urine) clear or pale yellow.  · Massage, stretch, and relax the muscle.  · Use a warm towel, heating pad, or warm shower water on tight muscles.  · Place ice on the muscle if it is tender or in pain.  ¨ Put ice in a plastic bag.  ¨ Place a towel between your skin and the bag.  ¨ Leave the ice on for 15-20 minutes, 03-04 times a day.  · Only take medicine as told by your doctor.  GET HELP RIGHT AWAY IF:   Your cramps or spasms get worse, happen more often, or do not get better with time.  MAKE SURE YOU:  · Understand these instructions.  · Will watch your condition.  · Will get help right away if you are not doing well or get worse.     This information is not intended to replace advice given to you by your health care provider. Make sure you discuss any questions you have with your health care provider.     Document Released: 11/30/2009 Document Revised: 04/14/2014 Document Reviewed: 12/04/2013  [employer] Interactive Patient Education ©2016 [employer]

## 2017-05-05 ENCOUNTER — TELEPHONE (OUTPATIENT)
Dept: MEDICAL GROUP | Facility: PHYSICIAN GROUP | Age: 57
End: 2017-05-05

## 2017-05-05 DIAGNOSIS — N64.4 BREAST PAIN: ICD-10-CM

## 2017-05-05 NOTE — TELEPHONE ENCOUNTER
I informed patient that you re-ordered mammo for diagnostic, however soonest available in 5/23.  I did confirm this with [facility].  Patient states she is in pain and does not feel she can physically/emotionally wait until 5/23 for mammogram.  Would you be willing to order this as a STAT image?

## 2017-05-05 NOTE — TELEPHONE ENCOUNTER
"Patient called stating that she called to schedule mammogram however imaging states this needs to be ordered as \"diagnostic mammogram\".  Patient explained she is still having breast pain and would like this ordered ASALANCE.  Kirby, would you be able to order in 's absence?    "

## 2017-05-05 NOTE — TELEPHONE ENCOUNTER
Patient has not been seen for breast pain, please have her schedule an appointment or be seen in urgent.

## 2017-05-08 ENCOUNTER — OFFICE VISIT (OUTPATIENT)
Dept: MEDICAL GROUP | Facility: PHYSICIAN GROUP | Age: 57
End: 2017-05-08
Payer: COMMERCIAL

## 2017-05-08 VITALS
OXYGEN SATURATION: 98 % | WEIGHT: 134.7 LBS | TEMPERATURE: 98.1 F | HEIGHT: 62 IN | DIASTOLIC BLOOD PRESSURE: 64 MMHG | HEART RATE: 78 BPM | RESPIRATION RATE: 12 BRPM | BODY MASS INDEX: 24.79 KG/M2 | SYSTOLIC BLOOD PRESSURE: 90 MMHG

## 2017-05-08 DIAGNOSIS — R41.3 MEMORY DIFFICULTIES: ICD-10-CM

## 2017-05-08 DIAGNOSIS — N64.4 PAIN OF RIGHT BREAST: ICD-10-CM

## 2017-05-08 DIAGNOSIS — F41.1 GENERALIZED ANXIETY DISORDER: ICD-10-CM

## 2017-05-08 PROCEDURE — 99214 OFFICE O/P EST MOD 30 MIN: CPT | Performed by: FAMILY MEDICINE

## 2017-05-08 RX ORDER — ALPRAZOLAM 0.25 MG/1
0.25 TABLET ORAL NIGHTLY PRN
Qty: 30 TAB | Refills: 2 | Status: SHIPPED | OUTPATIENT
Start: 2017-05-08 | End: 2018-03-09 | Stop reason: SDUPTHER

## 2017-05-08 RX ORDER — ALPRAZOLAM 0.25 MG/1
0.25 TABLET ORAL PRN
Qty: 30 TAB | Refills: 2 | Status: SHIPPED | OUTPATIENT
Start: 2017-05-08 | End: 2017-05-08 | Stop reason: SDUPTHER

## 2017-05-08 NOTE — PROGRESS NOTES
Subjective:   Jordyn Vidal is a 56 y.o. female here today for right breast pain.    Pain of right breast  Since patient's last appointment, she developed a severe pain in her right breast. She tells me that last Thursday-Friday, she noticed the pain when waking. She denies any trauma to her breast or wearing a new bra. She was scheduled to have a screening mammogram, but when she called to confirm, the schedule last changes diagnostic. With her history of thyroid cancer, she was concerned and requested that it be run stat.    Over the weekend, the pain did improve. She only has pain now which she presses on a certain area of her right breast. She would still like to have the diagnostic mammogram. She denies any skin rashes or nipple discharge.    Generalized anxiety disorder  Mood improved with current medication and therapy. Taking medications as prescribed without side effects. She requests a refill of alprazolam. She only takes this as needed and a prescription of 30 tablets will last her up to 5 months. Patient denies SI/HI.     Near the end of the visit, patient requested that I prescribe her ginkgo biloba. She tells me that her mother has been taking this to help with memory and she would like to start as a preventative action.    Current medicines (including changes today)  Current Outpatient Prescriptions   Medication Sig Dispense Refill   • alprazolam (XANAX) 0.25 MG Tab Take 1 Tab by mouth at bedtime as needed for Anxiety. 30 Tab 2   • levothyroxine (SYNTHROID) 125 MCG Tab Take 1 Tab by mouth Every morning on an empty stomach. 90 Tab 3   • diphenhydrAMINE (BENADRYL) 25 MG TABS Take 25 mg by mouth every evening.     • loratadine (CLARITIN) 10 MG TABS Take 10 mg by mouth every day.       No current facility-administered medications for this visit.     She  has a past medical history of Cancer (CMS-[facility]) (10/2013); Anesthesia; Unspecified disorder of thyroid (2013); Indigestion; Anxiety; and Allergy.    ROS  "  See above. No chest pain, no shortness of breath, no abdominal pain.     Objective:     Physical Exam:  Blood pressure 90/64, pulse 78, temperature 36.7 °C (98.1 °F), resp. rate 12, height 1.562 m (5' 1.5\"), weight 61.1 kg (134 lb 11.2 oz), SpO2 98 %. Body mass index is 25.04 kg/(m^2).   Constitutional: Alert, no distress.  Skin: Warm, dry, good turgor, no rashes in visible areas.  Eye: Conjunctiva clear, lids normal.  ENMT: Lips without lesions, good dentition, oropharynx clear.  Neck: Trachea midline, no masses, no thyromegaly.  Respiratory: Unlabored respiratory effort, lungs clear to auscultation, no wheezes, no ronchi.  Cardiovascular: Normal S1, S2, no murmur, no edema.  Breast: Breasts examined seated and supine. No skin changes, peau d'orange or nipple retraction. No discharge. No axillary or supraclavicular adenopathy. No masses or nodularity palpable. Tender to palpation around 9 o'clock position of right breast.  Abdomen: Soft, non-tender, no masses, no hepatosplenomegaly.  Psych: Alert and oriented x3, normal affect and mood.    Assessment and Plan:     1. Pain of right breast  The singing problem. Patient's order for screening mammogram has been changed to diagnostic mammogram. Given her reassuring exam, I do not see an indication for this test to be run stat at this time. She does have close follow-up and will be getting her diagnostic mammogram in the next 1-2 weeks. Strict return and ER precautions given.    2. Generalized anxiety disorder  Patient is feeling well on current medications. Will continue. Denies any suicidal or homicidal ideation. Emphasized importance of healthy diet and exercise. Discussed that should the patient have any symptoms they should call suicide prevention hotline or report to the emergency room immediately.  - alprazolam (XANAX) 0.25 MG Tab; Take 1 Tab by mouth at bedtime as needed for Anxiety.  Dispense: 30 Tab; Refill: 2    3. Memory difficulties  This is a new issue " for the patient. We did not have time to fully address it. I did advise her that she may  the ankle below the over-the-counter at any pharmacy.    Followup: Return if symptoms worsen or fail to improve.         PLEASE NOTE: This dictation was created using voice recognition software. I have made every reasonable attempt to correct obvious errors, but I expect that there are errors of grammar and possibly content that I did not discover before finalizing the note.

## 2017-05-08 NOTE — ASSESSMENT & PLAN NOTE
Since patient's last appointment, she developed a severe pain in her right breast. She tells me that last Thursday-Friday, she noticed the pain when waking. She denies any trauma to her breast or wearing a new bra. She was scheduled to have a screening mammogram, but when she called to confirm, the schedule last changes diagnostic. With her history of thyroid cancer, she was concerned and requested that it be run stat.    Over the weekend, the pain did improve. She only has pain now which she presses on a certain area of her right breast. She would still like to have the diagnostic mammogram. She denies any skin rashes or nipple discharge.

## 2017-05-08 NOTE — MR AVS SNAPSHOT
"        Jordyn Vidal   2017 11:00 AM   Office Visit   MRN: [id number]    Department:  [facility]   Dept Phone:  [phone]    Description:  Female : 1960   Provider:  Bety Harvey M.D.           Reason for Visit     Breast Pain right    Medication Refill Xanax, ginko biloba       Allergies as of 2017     Allergen Noted Reactions    Codeine 10/22/2009       GI intolerance    Zoloft 10/22/2009   Nausea      You were diagnosed with     Pain of right breast   [335269]       Generalized anxiety disorder   [300.02.ICD-9-CM]       Memory difficulties   [109924]         Vital Signs     Blood Pressure Pulse Temperature Respirations Height Weight    90/64 mmHg 78 36.7 °C (98.1 °F) 12 1.562 m (5' 1.5\") 61.1 kg (134 lb 11.2 oz)    Body Mass Index Oxygen Saturation Smoking Status             25.04 kg/m2 98% Never Smoker          Basic Information     Date Of Birth Sex Race Ethnicity Preferred Language    1960 Female Other Non- English      Your appointments     May 23, 2017  8:00 AM   MA DX30 with S MIKE MG 1   [facility] ([place])    [address]  [place] [address] [phone]            2017  8:00 AM   Established Patient with Bety Harvey M.D.   [facility] (--)    [address]  [address]  [place] [address] [phone]           You will be receiving a confirmation call a few days before your appointment from our automated call confirmation system.              Problem List              ICD-10-CM Priority Class Noted - Resolved    History of thyroid cancer Z85.850   10/19/2013 - Present    Postoperative hypothyroidism E89.0   2017 - Present    Nocturnal foot cramps R25.2   2017 - Present    Contusion of bone T14.8   2017 - Present    Pain of right breast N64.4   2017 - Present    Generalized anxiety disorder F41.1   2017 - Present      Health Maintenance        Date Due " Completion Dates    COLONOSCOPY 6/2/2010 ---    MAMMOGRAM 3/30/2017 3/30/2016, 10/2/2012, 1/11/2011, 3/19/2009, 3/19/2009    IMM DTaP/Tdap/Td Vaccine (1 - Tdap) 4/20/2018 (Originally 6/2/1979) ---            Current Immunizations     No immunizations on file.      Below and/or attached are the medications your provider expects you to take. Review all of your home medications and newly ordered medications with your provider and/or pharmacist. Follow medication instructions as directed by your provider and/or pharmacist. Please keep your medication list with you and share with your provider. Update the information when medications are discontinued, doses are changed, or new medications (including over-the-counter products) are added; and carry medication information at all times in the event of emergency situations     Allergies:  CODEINE - (reactions not documented)     ZOLOFT - Nausea               Medications  Valid as of: May 08, 2017 - 12:30 PM    Generic Name Brand Name Tablet Size Instructions for use    ALPRAZolam (Tab) XANAX 0.25 MG Take 1 Tab by mouth at bedtime as needed for Anxiety.        DiphenhydrAMINE HCl (Tab) BENADRYL 25 MG Take 25 mg by mouth every evening.        Levothyroxine Sodium (Tab) SYNTHROID 125 MCG Take 1 Tab by mouth Every morning on an empty stomach.        Loratadine (Tab) CLARITIN 10 MG Take 10 mg by mouth every day.        .                 Medicines prescribed today were sent to:     [facility] #[id number] - [place], [place] - [address]    [address] [place] [address]    Phone: [phone] Fax: [phone]    Open 24 Hours?: No      Medication refill instructions:       If your prescription bottle indicates you have medication refills left, it is not necessary to call your provider’s office. Please contact your pharmacy and they will refill your medication.    If your prescription bottle indicates you do not have any refills left, you may request refills at any time through  one of the following ways: The online PillPack system (except Urgent Care), by calling your provider’s office, or by asking your pharmacy to contact your provider’s office with a refill request. Medication refills are processed only during regular business hours and may not be available until the next business day. Your provider may request additional information or to have a follow-up visit with you prior to refilling your medication.   *Please Note: Medication refills are assigned a new Rx number when refilled electronically. Your pharmacy may indicate that no refills were authorized even though a new prescription for the same medication is available at the pharmacy. Please request the medicine by name with the pharmacy before contacting your provider for a refill.           PillPack Access Code: [email or web address]  Expires: 5/27/2017 10:04 AM    PillPack  A secure, online tool to manage your health information     [employer]’s PillPack® is a secure, online tool that connects you to your personalized health information from the privacy of your home -- day or night - making it very easy for you to manage your healthcare. Once the activation process is completed, you can even access your medical information using the PillPack chapin, which is available for free in the Apple Chapin store or Google Play store.     PillPack provides the following levels of access (as shown below):   My Chart Features   [facility]  Specialists [facility]  Urgent  Care [facility]  Primary Care  Doctor   Email your healthcare team securely and privately 24/7 X X X    Manage appointments: schedule your next appointment; view details of past/upcoming appointments X      Request prescription refills. X      View recent personal medical records, including lab and immunizations X X X X   View health record, including health history, allergies, medications X X X X   Read reports about your outpatient visits, procedures, consult and ER  notes X X X X   See your discharge summary, which is a recap of your hospital and/or ER visit that includes your diagnosis, lab results, and care plan. X X       How to register for Healthcare Bluebook:  1. Go to  [email or web address].  2. Click on the Sign Up Now box, which takes you to the New Member Sign Up page. You will need to provide the following information:  a. Enter your Healthcare Bluebook Access Code exactly as it appears at the top of this page. (You will not need to use this code after you’ve completed the sign-up process. If you do not sign up before the expiration date, you must request a new code.)   b. Enter your date of birth.   c. Enter your home email address.   d. Click Submit, and follow the next screen’s instructions.  3. Create a [employer] ID. This will be your [employer] login ID and cannot be changed, so think of one that is secure and easy to remember.  4. Create a Healthcare Bluebook password. You can change your password at any time.  5. Enter your Password Reset Question and Answer. This can be used at a later time if you forget your password.   6. Enter your e-mail address. This allows you to receive e-mail notifications when new information is available in Healthcare Bluebook.  7. Click Sign Up. You can now view your health information.    For assistance activating your Healthcare Bluebook account, call [phone]

## 2017-05-08 NOTE — ASSESSMENT & PLAN NOTE
Mood improved with current medication and therapy. Taking medications as prescribed without side effects. She requests a refill of alprazolam. She only takes this as needed and a prescription of 30 tablets will last her up to 5 months. Patient denies SI/HI.

## 2017-05-12 ENCOUNTER — HOSPITAL ENCOUNTER (OUTPATIENT)
Dept: RADIOLOGY | Facility: MEDICAL CENTER | Age: 57
End: 2017-05-12

## 2017-05-23 ENCOUNTER — HOSPITAL ENCOUNTER (OUTPATIENT)
Dept: RADIOLOGY | Facility: MEDICAL CENTER | Age: 57
End: 2017-05-23
Attending: FAMILY MEDICINE
Payer: COMMERCIAL

## 2017-05-23 DIAGNOSIS — N64.4 PAIN OF RIGHT BREAST: ICD-10-CM

## 2017-05-23 PROCEDURE — 76642 ULTRASOUND BREAST LIMITED: CPT | Mod: RT

## 2017-05-23 PROCEDURE — G0204 DX MAMMO INCL CAD BI: HCPCS

## 2017-07-31 ENCOUNTER — APPOINTMENT (OUTPATIENT)
Dept: MEDICAL GROUP | Facility: PHYSICIAN GROUP | Age: 57
End: 2017-07-31

## 2017-11-14 ENCOUNTER — HOSPITAL ENCOUNTER (OUTPATIENT)
Dept: LAB | Facility: MEDICAL CENTER | Age: 57
End: 2017-11-14
Attending: FAMILY MEDICINE

## 2017-11-14 DIAGNOSIS — Z85.850 HISTORY OF THYROID CANCER: ICD-10-CM

## 2017-11-14 DIAGNOSIS — E89.0 POSTOPERATIVE HYPOTHYROIDISM: ICD-10-CM

## 2017-11-14 LAB
T4 FREE SERPL-MCNC: 1.05 NG/DL (ref 0.53–1.43)
TSH SERPL DL<=0.005 MIU/L-ACNC: 0.19 UIU/ML (ref 0.3–3.7)

## 2017-11-14 PROCEDURE — 84439 ASSAY OF FREE THYROXINE: CPT

## 2017-11-14 PROCEDURE — 36415 COLL VENOUS BLD VENIPUNCTURE: CPT

## 2017-11-14 PROCEDURE — 86800 THYROGLOBULIN ANTIBODY: CPT

## 2017-11-14 PROCEDURE — 84432 ASSAY OF THYROGLOBULIN: CPT

## 2017-11-14 PROCEDURE — 84443 ASSAY THYROID STIM HORMONE: CPT

## 2017-11-16 ENCOUNTER — TELEPHONE (OUTPATIENT)
Dept: MEDICAL GROUP | Facility: PHYSICIAN GROUP | Age: 57
End: 2017-11-16

## 2017-11-16 LAB
THYROGLOB AB SERPL-ACNC: 1 IU/ML (ref 0–4)
THYROGLOB SERPL-MCNC: <0.1 NG/ML (ref 1.3–31.8)
THYROGLOB SERPL-MCNC: ABNORMAL NG/ML (ref 1.3–31.8)

## 2017-11-16 NOTE — TELEPHONE ENCOUNTER
Phone Number Called: [phone] (home)       Message: Pt notified of results and scheduled an appointment.     Left Message for patient to call back: N\A

## 2017-11-16 NOTE — TELEPHONE ENCOUNTER
----- Message from Bety Harvey M.D. sent at 11/16/2017  7:32 AM PST -----  Please let patient know that her TSH is low and we need to adjust her medication. I would like to see her in the office to discuss her results.  -Bety Harvey M.D.

## 2017-11-17 ENCOUNTER — OFFICE VISIT (OUTPATIENT)
Dept: MEDICAL GROUP | Facility: PHYSICIAN GROUP | Age: 57
End: 2017-11-17

## 2017-11-17 VITALS
WEIGHT: 135 LBS | TEMPERATURE: 98.6 F | BODY MASS INDEX: 25.49 KG/M2 | HEIGHT: 61 IN | OXYGEN SATURATION: 97 % | HEART RATE: 92 BPM | RESPIRATION RATE: 16 BRPM | SYSTOLIC BLOOD PRESSURE: 100 MMHG | DIASTOLIC BLOOD PRESSURE: 68 MMHG

## 2017-11-17 DIAGNOSIS — Z79.890 POSTMENOPAUSAL HORMONE REPLACEMENT THERAPY: ICD-10-CM

## 2017-11-17 DIAGNOSIS — F41.1 GENERALIZED ANXIETY DISORDER: ICD-10-CM

## 2017-11-17 DIAGNOSIS — Z85.850 HISTORY OF THYROID CANCER: ICD-10-CM

## 2017-11-17 DIAGNOSIS — E89.0 POSTOPERATIVE HYPOTHYROIDISM: ICD-10-CM

## 2017-11-17 PROBLEM — R25.2 NOCTURNAL FOOT CRAMPS: Status: RESOLVED | Noted: 2017-04-28 | Resolved: 2017-11-17

## 2017-11-17 PROBLEM — N64.4 PAIN OF RIGHT BREAST: Status: RESOLVED | Noted: 2017-05-08 | Resolved: 2017-11-17

## 2017-11-17 PROBLEM — T14.8XXA CONTUSION OF BONE: Status: RESOLVED | Noted: 2017-04-28 | Resolved: 2017-11-17

## 2017-11-17 PROCEDURE — 99214 OFFICE O/P EST MOD 30 MIN: CPT | Performed by: FAMILY MEDICINE

## 2017-11-17 RX ORDER — ESTRADIOL 0.5 MG/1
0.5 TABLET ORAL DAILY
Qty: 90 TAB | Refills: 3 | Status: SHIPPED | OUTPATIENT
Start: 2017-11-17 | End: 2019-02-08

## 2017-11-17 RX ORDER — LEVOTHYROXINE SODIUM 0.12 MG/1
TABLET ORAL
Qty: 90 TAB | Refills: 3 | Status: SHIPPED | OUTPATIENT
Start: 2017-11-17 | End: 2018-03-09 | Stop reason: SDUPTHER

## 2017-11-17 ASSESSMENT — PAIN SCALES - GENERAL: PAINLEVEL: NO PAIN

## 2017-11-17 NOTE — ASSESSMENT & PLAN NOTE
History of thyroid cancer  Labwork reviewed and TSH is still slightly suppressed. Thyroglobulin levels are normal. Taking levothyroxine 125mcg as directed. Denies palpitations, skin changes, temperature intolerance, changes in bowel habits.    Results for LOLITA PORTER (MRN [id number]) as of 11/17/2017 13:05   Ref. Range 11/14/2017 14:18   TSH Latest Ref Range: 0.300 - 3.700 uIU/mL 0.190 (L)   Free T-4 Latest Ref Range: 0.53 - 1.43 ng/dL 1.05   Thyroglobulin by LC-MC/MS-S/P Latest Ref Range: 1.3 - 31.8 ng/mL Not Applicable   Thyroglobulin Latest Ref Range: 1.3 - 31.8 ng/mL <0.1 (L)   Anti-Thyroglobulin Latest Ref Range: 0.0 - 4.0 IU/mL 1.0

## 2017-11-17 NOTE — PROGRESS NOTES
"Subjective:   Jordyn Vidal is a 57 y.o. female here today for follow-up hypothyroidism and lab results.    Postoperative hypothyroidism  History of thyroid cancer  Labwork reviewed and TSH is still slightly suppressed. Thyroglobulin levels are normal. Taking levothyroxine 125mcg as directed. Denies palpitations, skin changes, temperature intolerance, changes in bowel habits.    Results for JORDYN VIDAL (MRN [id number]) as of 11/17/2017 13:05   Ref. Range 11/14/2017 14:18   TSH Latest Ref Range: 0.300 - 3.700 uIU/mL 0.190 (L)   Free T-4 Latest Ref Range: 0.53 - 1.43 ng/dL 1.05   Thyroglobulin by LC-MC/MS-S/P Latest Ref Range: 1.3 - 31.8 ng/mL Not Applicable   Thyroglobulin Latest Ref Range: 1.3 - 31.8 ng/mL <0.1 (L)   Anti-Thyroglobulin Latest Ref Range: 0.0 - 4.0 IU/mL 1.0     Postmenopausal hormone replacement therapy  Patient would like to get started on hormone replacement therapy. Over the last several months, she has been feeling tired, feeling a little more \"down\" and also noticing changes in her skin. She has had a hysterectomy in the past. She has never been on hormone replacement therapy. At this time, she is not interested in medication for depression or seeing a dermatologist. We discussed the risks of hormone replacement therapy including heart disease, stroke, breast cancer and blood clots. She is fully aware of these would like to continue with the treatment.    Generalized anxiety disorder  Mood improved with current medication and therapy. Taking medications as prescribed without side effects. Patient denies SI/HI.     Current medicines (including changes today)  Current Outpatient Prescriptions   Medication Sig Dispense Refill   • estradiol (ESTRACE) 0.5 MG tablet Take 1 Tab by mouth every day. 90 Tab 3   • levothyroxine (SYNTHROID) 125 MCG Tab Take 1 tablet PO daily on Monday through Saturdays. Take 1/2 tablet PO daily on Sundays. 90 Tab 3   • alprazolam (XANAX) 0.25 MG Tab Take 1 Tab by mouth at " "bedtime as needed for Anxiety. 30 Tab 2   • diphenhydrAMINE (BENADRYL) 25 MG TABS Take 25 mg by mouth every evening.     • loratadine (CLARITIN) 10 MG TABS Take 10 mg by mouth every day.       No current facility-administered medications for this visit.      She  has a past medical history of Allergy; Anesthesia; Anxiety; Cancer (CMS-HCC) (10/2013); Indigestion; and Unspecified disorder of thyroid (2013).    ROS   See HPI. No chest pain, no shortness of breath, no abdominal pain.     Objective:     Physical Exam:  Blood pressure 100/68, pulse 92, temperature 37 °C (98.6 °F), resp. rate 16, height 1.549 m (5' 1\"), weight 61.2 kg (135 lb), SpO2 97 %, not currently breastfeeding. Body mass index is 25.51 kg/m².   Constitutional: Alert, no distress.  Skin: Warm, dry, good turgor, no rashes in visible areas.  Eye: Equal, round and reactive, conjunctiva clear, lids normal.  ENMT: Lips without lesions, good dentition, oropharynx clear.  Neck: Trachea midline, no masses, no thyromegaly.  Respiratory: Unlabored respiratory effort, lungs clear to auscultation, no wheezes, no ronchi.  Cardiovascular: Normal S1, S2, no murmur, no edema.  Abdomen: Soft, non-tender, no masses, no hepatosplenomegaly.  Psych: Alert and oriented x3, normal affect and mood.    Assessment and Plan:     1. Postoperative hypothyroidism  2. History of thyroid cancer  TSH noted to be slightly suppressed. It is slightly lower than in April of earlier this year. Patient had actually requested to increase her dose. We agreed to keep her at the present dose, but to cut her pill in half for a total of 6-1/2 tablets a week. We'll recheck TSH in 6-8 weeks.   - levothyroxine (SYNTHROID) 125 MCG Tab; Take 1 tablet PO daily on Monday through Saturdays. Take 1/2 tablet PO daily on Sundays.  Dispense: 90 Tab; Refill: 3  - TSH; Future    3. Postmenopausal hormone replacement therapy  This is a new issue today. Patient requests a trial of low-dose hormone replacement " therapy. Query if some of her symptoms may be secondary to a psychosocial issue such as depression. We had a lengthy discussion regarding the risks of hormone replacement therapy. She acknowledges these and would like to proceed. Prescription provided.  - estradiol (ESTRACE) 0.5 MG tablet; Take 1 Tab by mouth every day.  Dispense: 90 Tab; Refill: 3    4. Generalized anxiety disorder  Patient is feeling well on current medications. Will continue. Denies any suicidal or homicidal ideation. Emphasized importance of healthy diet and exercise. Discussed that should the patient have any symptoms they should call suicide prevention hotline or report to the emergency room immediately.    Followup: 6 months.         PLEASE NOTE: This dictation was created using voice recognition software. I have made every reasonable attempt to correct obvious errors, but I expect that there are errors of grammar and possibly content that I did not discover before finalizing the note.

## 2017-11-17 NOTE — ASSESSMENT & PLAN NOTE
"Patient would like to get started on hormone replacement therapy. Over the last several months, she has been feeling tired, feeling a little more \"down\" and also noticing changes in her skin. She has had a hysterectomy in the past. She has never been on hormone replacement therapy. At this time, she is not interested in medication for depression or seeing a dermatologist. We discussed the risks of hormone replacement therapy including heart disease, stroke, breast cancer and blood clots. She is fully aware of these would like to continue with the treatment.  "

## 2018-03-09 ENCOUNTER — TELEPHONE (OUTPATIENT)
Dept: MEDICAL GROUP | Facility: PHYSICIAN GROUP | Age: 58
End: 2018-03-09

## 2018-03-09 ENCOUNTER — HOSPITAL ENCOUNTER (OUTPATIENT)
Dept: LAB | Facility: MEDICAL CENTER | Age: 58
End: 2018-03-09
Attending: FAMILY MEDICINE

## 2018-03-09 ENCOUNTER — OFFICE VISIT (OUTPATIENT)
Dept: MEDICAL GROUP | Facility: PHYSICIAN GROUP | Age: 58
End: 2018-03-09

## 2018-03-09 VITALS
TEMPERATURE: 97.8 F | DIASTOLIC BLOOD PRESSURE: 58 MMHG | BODY MASS INDEX: 24.92 KG/M2 | WEIGHT: 132 LBS | OXYGEN SATURATION: 99 % | HEIGHT: 61 IN | SYSTOLIC BLOOD PRESSURE: 102 MMHG | RESPIRATION RATE: 14 BRPM | HEART RATE: 95 BPM

## 2018-03-09 DIAGNOSIS — Z85.850 HISTORY OF THYROID CANCER: ICD-10-CM

## 2018-03-09 DIAGNOSIS — E89.0 POSTOPERATIVE HYPOTHYROIDISM: ICD-10-CM

## 2018-03-09 DIAGNOSIS — F41.1 GENERALIZED ANXIETY DISORDER: ICD-10-CM

## 2018-03-09 LAB — TSH SERPL DL<=0.005 MIU/L-ACNC: 0.26 UIU/ML (ref 0.38–5.33)

## 2018-03-09 PROCEDURE — 99214 OFFICE O/P EST MOD 30 MIN: CPT | Performed by: NURSE PRACTITIONER

## 2018-03-09 PROCEDURE — 36415 COLL VENOUS BLD VENIPUNCTURE: CPT

## 2018-03-09 PROCEDURE — 84443 ASSAY THYROID STIM HORMONE: CPT

## 2018-03-09 RX ORDER — ALPRAZOLAM 0.25 MG/1
0.25 TABLET ORAL NIGHTLY PRN
Qty: 45 TAB | Refills: 0 | Status: SHIPPED | OUTPATIENT
Start: 2018-03-09 | End: 2018-05-08

## 2018-03-09 RX ORDER — LEVOTHYROXINE SODIUM 0.12 MG/1
TABLET ORAL
Qty: 90 TAB | Refills: 3 | Status: SHIPPED | OUTPATIENT
Start: 2018-03-09 | End: 2018-03-09

## 2018-03-09 RX ORDER — LEVOTHYROXINE SODIUM 0.12 MG/1
TABLET ORAL
Qty: 90 TAB | Refills: 3 | Status: SHIPPED | OUTPATIENT
Start: 2018-03-09 | End: 2019-01-18 | Stop reason: SDUPTHER

## 2018-03-09 ASSESSMENT — PAIN SCALES - GENERAL: PAINLEVEL: NO PAIN

## 2018-03-09 ASSESSMENT — PATIENT HEALTH QUESTIONNAIRE - PHQ9: CLINICAL INTERPRETATION OF PHQ2 SCORE: 0

## 2018-03-09 NOTE — TELEPHONE ENCOUNTER
Phone Number Called: [phone] (home)     Message: Pt notified of results below.     Left Message for patient to call back: N\A

## 2018-03-09 NOTE — ASSESSMENT & PLAN NOTE
Labwork due. Taking medicine as directed. Denies palpitations, issues with weight gain or loss, skin or hair changes, temperature intolerance, changes in bowel habits. Last TSH was slightly low patient is encouraged to complete updated TSH ordered by Dr. Harvey. Refill of her medication is provided at this time.

## 2018-03-09 NOTE — PROGRESS NOTES
Chief Complaint   Patient presents with   • Medication Refill     xanax;thyroid        HISTORY OF PRESENT ILLNESS: Patient is a 57 y.o. female established patient who presents today to discuss hypothyroidism and anxiety disorder.    Postoperative hypothyroidism  Labwork due. Taking medicine as directed. Denies palpitations, issues with weight gain or loss, skin or hair changes, temperature intolerance, changes in bowel habits. Last TSH was slightly low patient is encouraged to complete updated TSH ordered by Dr. Harvey. Refill of her medication is provided at this time.    Generalized anxiety disorder  Chronic in nature. Stable. Patient continues to do well on current dose of Xanax. Patient states that she takes this with a couple of times per week. Patient denies suicidal and homicidal ideation.      Patient Active Problem List    Diagnosis Date Noted   • Postmenopausal hormone replacement therapy 11/17/2017   • Generalized anxiety disorder 05/08/2017   • Postoperative hypothyroidism 01/31/2017   • History of thyroid cancer 10/19/2013       Allergies:Codeine and Zoloft    Current Outpatient Prescriptions   Medication Sig Dispense Refill   • ALPRAZolam (XANAX) 0.25 MG Tab Take 1 Tab by mouth at bedtime as needed for Anxiety for up to 60 days. 45 Tab 0   • levothyroxine (SYNTHROID) 125 MCG Tab Take 1 tablet PO daily on Monday through Saturdays. Take 1/2 tablet PO daily on Sundays. 90 Tab 3   • estradiol (ESTRACE) 0.5 MG tablet Take 1 Tab by mouth every day. 90 Tab 3   • diphenhydrAMINE (BENADRYL) 25 MG TABS Take 25 mg by mouth every evening.     • loratadine (CLARITIN) 10 MG TABS Take 10 mg by mouth every day.       No current facility-administered medications for this visit.        Social History   Substance Use Topics   • Smoking status: Never Smoker   • Smokeless tobacco: Never Used   • Alcohol use Yes      Comment: 0-1 drinks a week, no binge-drinking       Family Status   Relation Status   • Mother Alive   •  "Father    • Sister Alive   • Brother Alive   • Sister Alive     Family History   Problem Relation Age of Onset   • Cancer Father        Review of Systems:   Constitutional:  Negative for fever, chills, weight loss and malaise/fatigue.   HEENT:  Negative for ear pain, nosebleeds, congestion, sore throat and neck pain.    Eyes:  Negative for blurred vision.   Respiratory:  Negative for cough, sputum production, shortness of breath and wheezing.    Cardiovascular:  Negative for chest pain, palpitations, orthopnea and leg swelling.   Gastrointestinal:  Negative for heartburn, nausea, vomiting and abdominal pain.   Genitourinary:  Negative for dysuria, urgency and frequency.   Musculoskeletal:  Negative for myalgias, back pain and joint pain.   Skin:  Negative for rash and itching.   Neurological:  Negative for dizziness, tingling, tremors, sensory change, focal weakness and headaches.   Endo/Heme/Allergies:  Does not bruise/bleed easily.   Psychiatric/Behavioral:  Negative for depression, suicidal ideas and memory loss.  The patient is not nervous/anxious and does not have insomnia.    All other systems reviewed and are negative except as in HPI.    Exam:  Blood pressure 102/58, pulse 95, temperature 36.6 °C (97.8 °F), resp. rate 14, height 1.549 m (5' 1\"), weight 59.9 kg (132 lb), SpO2 99 %, not currently breastfeeding.  General:  Normal appearing. No distress.  Neck:  Supple without JVD or bruit. Thyroid is not enlarged.  Pulmonary:  Clear to ausculation.  Normal effort. No rales, ronchi, or wheezing.  Cardiovascular:  Regular rate and rhythm without murmur. Carotid and radial pulses are intact and equal bilaterally.  Neurologic:  Grossly nonfocal  Skin:  Warm and dry.  No obvious lesions.  Musculoskeletal:  Normal gait. No extremity cyanosis, clubbing, or edema.  Psych:  Normal mood and affect. Alert and oriented x3. Judgment and insight is normal.      PLAN:    1. Postoperative hypothyroidism  Patient will " continue current medication at this time. Patient is encouraged to get TSH drawn.  - levothyroxine (SYNTHROID) 125 MCG Tab; Take 1 tablet PO daily on Monday through Saturdays. Take 1/2 tablet PO daily on Sundays.  Dispense: 90 Tab; Refill: 3    2. Generalized anxiety disorder  Patient will continue Xanax at this time  patient regarding risks, side effects of the medication. Advised no drinking etoh, driving, or operating heavy equipment while taking.  Pt was advised of the sedation effect of these medications.   - ALPRAZolam (XANAX) 0.25 MG Tab; Take 1 Tab by mouth at bedtime as needed for Anxiety for up to 60 days.  Dispense: 45 Tab; Refill: 0    Patient will schedule follow-up to discuss refill of Xanax with Dr. Harvey as well as plan going forward. Patient is encouraged to be seen in the emergency room for chest pain, palpitations, shortness of breath, dizziness, severe abdominal pain or other concerning symptoms.    Please note that this dictation was created using voice recognition software. I have made every reasonable attempt to correct obvious errors, but I expect that there are errors of grammar and possibly content that I did not discover before finalizing the note.    Assessment/Plan:  1. Postoperative hypothyroidism  levothyroxine (SYNTHROID) 125 MCG Tab    DISCONTINUED: levothyroxine (SYNTHROID) 125 MCG Tab   2. Generalized anxiety disorder  ALPRAZolam (XANAX) 0.25 MG Tab          I have placed the below orders and discussed them with an approved delegating provider. The MA is performing the below orders under the direction of Dr. Harvey.

## 2018-03-09 NOTE — ASSESSMENT & PLAN NOTE
Chronic in nature. Stable. Patient continues to do well on current dose of Xanax. Patient states that she takes this with a couple of times per week. Patient denies suicidal and homicidal ideation.

## 2018-03-09 NOTE — TELEPHONE ENCOUNTER
----- Message from Bety Harvey M.D. sent at 3/9/2018 12:39 PM PST -----  Please let patient know that her TSH is 0.260. This is still low, but better than last time. We will keep her thyroid medication dose the same.  Bety Harvey M.D.

## 2018-11-15 ENCOUNTER — TELEPHONE (OUTPATIENT)
Dept: MEDICAL GROUP | Facility: PHYSICIAN GROUP | Age: 58
End: 2018-11-15

## 2018-11-15 DIAGNOSIS — Z79.890 POSTMENOPAUSAL HORMONE REPLACEMENT THERAPY: ICD-10-CM

## 2018-11-15 DIAGNOSIS — E89.0 POSTOPERATIVE HYPOTHYROIDISM: ICD-10-CM

## 2018-11-15 NOTE — TELEPHONE ENCOUNTER
Phone Number Called: [phone] (home)     Message: Notified pt that orders have been placed and are in the mail for her.     Left Message for patient to call back: N\A

## 2018-11-15 NOTE — TELEPHONE ENCOUNTER
1. Caller Name: Jordyn Vidal                      Call Back Number: [phone] (home)     2. Message: Pt would like thyroid labs and bone density scan ordered and mailed to her for her appoitnement in January please advise.     3. Patient approves office to leave a detailed voicemail/MyChart message: N\A

## 2018-12-03 ENCOUNTER — HOSPITAL ENCOUNTER (OUTPATIENT)
Dept: RADIOLOGY | Facility: MEDICAL CENTER | Age: 58
End: 2018-12-03
Attending: FAMILY MEDICINE

## 2018-12-03 ENCOUNTER — TELEPHONE (OUTPATIENT)
Dept: MEDICAL GROUP | Facility: PHYSICIAN GROUP | Age: 58
End: 2018-12-03

## 2018-12-03 DIAGNOSIS — Z79.890 POSTMENOPAUSAL HORMONE REPLACEMENT THERAPY: ICD-10-CM

## 2018-12-03 PROCEDURE — 77080 DXA BONE DENSITY AXIAL: CPT

## 2018-12-03 NOTE — TELEPHONE ENCOUNTER
----- Message from Bety Harvey M.D. sent at 12/3/2018 10:58 AM PST -----  Please let patient know that her bone density shows weakening of the bones, but not to the degree of osteoporosis. I recommend that we repeat your DEXA test in 2 years.  The following strategies will help you maintain your bone density: exercise regularly, keep a healthy body weight, consume 800 IU Vitamin D and 1,200mg Calcium daily.   -Bety Harvey M.D.

## 2018-12-03 NOTE — TELEPHONE ENCOUNTER
Phone Number Called: [phone] (home)     Message: Spoke with patient and let them know Alecia Orozco M.D.'s message.    Left Message for patient to call back: no

## 2019-01-18 DIAGNOSIS — E89.0 POSTOPERATIVE HYPOTHYROIDISM: ICD-10-CM

## 2019-01-18 RX ORDER — LEVOTHYROXINE SODIUM 0.12 MG/1
TABLET ORAL
Qty: 90 TAB | Refills: 0 | Status: SHIPPED | OUTPATIENT
Start: 2019-01-18 | End: 2019-02-08 | Stop reason: SDUPTHER

## 2019-02-08 ENCOUNTER — HOSPITAL ENCOUNTER (OUTPATIENT)
Dept: LAB | Facility: MEDICAL CENTER | Age: 59
End: 2019-02-08
Attending: FAMILY MEDICINE
Payer: COMMERCIAL

## 2019-02-08 ENCOUNTER — OFFICE VISIT (OUTPATIENT)
Dept: MEDICAL GROUP | Facility: PHYSICIAN GROUP | Age: 59
End: 2019-02-08
Payer: COMMERCIAL

## 2019-02-08 VITALS
HEART RATE: 88 BPM | OXYGEN SATURATION: 97 % | RESPIRATION RATE: 16 BRPM | DIASTOLIC BLOOD PRESSURE: 60 MMHG | WEIGHT: 136.2 LBS | HEIGHT: 61 IN | BODY MASS INDEX: 25.71 KG/M2 | SYSTOLIC BLOOD PRESSURE: 102 MMHG | TEMPERATURE: 97.3 F

## 2019-02-08 DIAGNOSIS — L80 VITILIGO: ICD-10-CM

## 2019-02-08 DIAGNOSIS — E89.0 POSTOPERATIVE HYPOTHYROIDISM: ICD-10-CM

## 2019-02-08 DIAGNOSIS — F41.1 GENERALIZED ANXIETY DISORDER: ICD-10-CM

## 2019-02-08 DIAGNOSIS — M79.601 RIGHT ARM PAIN: ICD-10-CM

## 2019-02-08 DIAGNOSIS — M77.11 LATERAL EPICONDYLITIS OF RIGHT ELBOW: ICD-10-CM

## 2019-02-08 DIAGNOSIS — Z12.39 BREAST CANCER SCREENING: ICD-10-CM

## 2019-02-08 PROBLEM — Z79.890 POSTMENOPAUSAL HORMONE REPLACEMENT THERAPY: Status: RESOLVED | Noted: 2017-11-17 | Resolved: 2019-02-08

## 2019-02-08 LAB
T4 FREE SERPL-MCNC: 1.22 NG/DL (ref 0.53–1.43)
TSH SERPL DL<=0.005 MIU/L-ACNC: 0.27 UIU/ML (ref 0.38–5.33)

## 2019-02-08 PROCEDURE — 84439 ASSAY OF FREE THYROXINE: CPT

## 2019-02-08 PROCEDURE — 36415 COLL VENOUS BLD VENIPUNCTURE: CPT

## 2019-02-08 PROCEDURE — 84443 ASSAY THYROID STIM HORMONE: CPT

## 2019-02-08 PROCEDURE — 99214 OFFICE O/P EST MOD 30 MIN: CPT | Performed by: FAMILY MEDICINE

## 2019-02-08 RX ORDER — ALPRAZOLAM 0.25 MG/1
0.25 TABLET ORAL 2 TIMES DAILY PRN
Qty: 30 TAB | Refills: 1 | Status: SHIPPED
Start: 2019-02-08 | End: 2019-10-22 | Stop reason: SDUPTHER

## 2019-02-08 RX ORDER — MELOXICAM 15 MG/1
15 TABLET ORAL DAILY
Qty: 7 TAB | Refills: 0 | Status: SHIPPED | OUTPATIENT
Start: 2019-02-08 | End: 2019-02-15

## 2019-02-08 RX ORDER — LEVOTHYROXINE SODIUM 0.12 MG/1
TABLET ORAL
Qty: 90 TAB | Refills: 3 | Status: SHIPPED | OUTPATIENT
Start: 2019-02-08 | End: 2019-11-27 | Stop reason: SDUPTHER

## 2019-02-08 RX ORDER — ALPRAZOLAM 0.25 MG/1
0.25 TABLET ORAL NIGHTLY PRN
COMMUNITY
End: 2019-02-08 | Stop reason: SDUPTHER

## 2019-02-08 RX ORDER — LEVOTHYROXINE SODIUM 125 UG/1
CAPSULE ORAL
Refills: 0 | COMMUNITY
Start: 2019-01-19 | End: 2019-02-08

## 2019-02-08 RX ORDER — TRIAMCINOLONE ACETONIDE 1 MG/G
CREAM TOPICAL
Qty: 30 G | Refills: 2 | Status: SHIPPED | OUTPATIENT
Start: 2019-02-08 | End: 2021-01-04

## 2019-02-08 ASSESSMENT — PATIENT HEALTH QUESTIONNAIRE - PHQ9: CLINICAL INTERPRETATION OF PHQ2 SCORE: 0

## 2019-02-08 NOTE — PROGRESS NOTES
"Subjective:   Jordyn Vidal is a 58 y.o. female here today for right arm pain, follow-up anxiety, follow-up hypothyroidism and skin concerns.  She is unaccompanied for today's visit.  My last appointment with \"Elizabeth\" was in November 2017.    Right arm pain  This is a new problem, but has been going on for the last several weeks.  She tells me that she uses the computer frequently and after 5-10 minutes, will have rather severe pain in her right forearm.  It extends up into the elbow.  She denies muscle or  weakness.  Denies injury, trauma or falls.  No numbness or tingling.  She mentions that she did have a cortisone injection in the past which helped her for several years.  She believes that she had tennis elbow at the time.    Generalized anxiety disorder  This is a chronic and stable problem.  She is requesting a refill of Xanax.  She takes half tablet a couple of times a week.  A prescription will usually last her for a whole year.  Denies any depression.  No suicidal or homicidal thoughts.    Postoperative hypothyroidism  This is a chronic and stable problem.  Patient reports that she had her labs drawn today. Taking medicine as directed. Denies palpitations, skin changes, temperature intolerance, changes in bowel habits.    Vitiligo  Patient states that the \"white spots\" on her right arm have been increasing in size.  She has not been using any specific creams or lotions to treat them.  Denies any family history of vitiligo.  The spots are not itchy or painful.    Current medicines (including changes today)  Current Outpatient Prescriptions   Medication Sig Dispense Refill   • meloxicam (MOBIC) 15 MG tablet Take 1 Tab by mouth every day for 7 days. 7 Tab 0   • ALPRAZolam (XANAX) 0.25 MG Tab Take 1 Tab by mouth 2 times a day as needed for Anxiety for up to 30 days. 30 Tab 1   • levothyroxine (SYNTHROID) 125 MCG Tab Take 1 tablet PO daily on Monday through Saturdays. Take 1/2 tablet PO daily on Sundays. 90 Tab " "3   • triamcinolone acetonide (KENALOG) 0.1 % Cream Apply thin layer to affected areas twice a day for up to two weeks.  May resume after two weeks of no use. 30 g 2   • diphenhydrAMINE (BENADRYL) 25 MG TABS Take 25 mg by mouth every evening.     • loratadine (CLARITIN) 10 MG TABS Take 10 mg by mouth every day.       No current facility-administered medications for this visit.      She  has a past medical history of Allergy; Anesthesia; Anxiety; Cancer (HCC) (10/2013); Indigestion; and Unspecified disorder of thyroid (2013).    ROS   No chest pain, no shortness of breath, no abdominal pain.     Objective:     Physical Exam:  Blood pressure 102/60, pulse 88, temperature 36.3 °C (97.3 °F), resp. rate 16, height 1.549 m (5' 1\"), weight 61.8 kg (136 lb 3.2 oz), SpO2 97 %, not currently breastfeeding. Body mass index is 25.73 kg/m².   Constitutional: Alert, no distress.  Skin: Warm, dry, good turgor. On right arm, near her elbow and shoulder there are a few flat hypopigmented macules.  Eye: Equal, round and reactive, conjunctiva clear, lids normal.  ENMT: Lips without lesions, good dentition, oropharynx clear.  Neck: Trachea midline, no masses, no thyromegaly.  Respiratory: Unlabored respiratory effort, lungs clear to auscultation, no wheezes, no rhonchi.  Cardiovascular: Normal S1, S2, no murmur, no edema.  Abdomen: Soft, non-tender, no masses, no hepatosplenomegaly.  MSK: Normal gait, moves all extremities.  Elbow: No deformity, swelling or bruising noted. Tenderness to palpation along left lateral epicondyle. This reproduces pain. Full range of motion bilaterally. 2+ biceps, triceps and brachioradialis deep tendon reflexes bilaterally. 5/5 strength bilaterally.  Psych: Alert and oriented x3, normal affect and mood.    Assessment and Plan:     1. Right arm pain  2. Lateral epicondylitis of right elbow  This is a new problem to me today, but has occurred in the past per patient report.  She is requesting a cortisone " injection.  I advised her that these are not strongly recommended as a first line treatment anymore.  I provided her with a 7-day course of meloxicam.  Rest was advised.  Discussed using an over-the-counter tennis elbow brace.  She is concerned that these measures will not be enough and therefore I also referred her to orthopedics.  - meloxicam (MOBIC) 15 MG tablet; Take 1 Tab by mouth every day for 7 days.  Dispense: 7 Tab; Refill: 0  - REFERRAL TO ORTHOPEDICS    3. Generalized anxiety disorder  Patient is feeling well on current medications.  Will continue.  Denies any suicidal or homicidal ideation.  Emphasized importance of healthy diet and exercise.  Discussed that should the patient have any symptoms they should call suicide prevention hotline or report to the emergency room immediately.  - ALPRAZolam (XANAX) 0.25 MG Tab; Take 1 Tab by mouth 2 times a day as needed for Anxiety for up to 30 days.  Dispense: 30 Tab; Refill: 1    4. Postoperative hypothyroidism  Continue thyroid medication.  Will change dose if thyroid labs are abnormal.  Instructed patient to take on empty stomach with glass of water, 30 minutes prior to food or other medications.  Labs as indicated.  - levothyroxine (SYNTHROID) 125 MCG Tab; Take 1 tablet PO daily on Monday through Saturdays. Take 1/2 tablet PO daily on Sundays.  Dispense: 90 Tab; Refill: 3    5. Vitiligo  Flat hypopigmented macules most consistent with vitiligo.  However, there is no family history of this.  We discussed treatment and agreed on a trial of medium potency topical steroid.  If this does not improve, we will try another cream or consider referral to dermatology.  - triamcinolone acetonide (KENALOG) 0.1 % Cream; Apply thin layer to affected areas twice a day for up to two weeks.  May resume after two weeks of no use.  Dispense: 30 g; Refill: 2    6. Breast cancer screening  Mammogram ordered.  - MA-MAMMO SCREENING BILAT W/AGUEDA W/O CAD; Future    Followup: As  needed         PLEASE NOTE: This dictation was created using voice recognition software. I have made every reasonable attempt to correct obvious errors, but I expect that there are errors of grammar and possibly content that I did not discover before finalizing the note.

## 2019-02-10 ENCOUNTER — PATIENT MESSAGE (OUTPATIENT)
Dept: MEDICAL GROUP | Facility: PHYSICIAN GROUP | Age: 59
End: 2019-02-10

## 2019-02-11 NOTE — PATIENT COMMUNICATION
Phone Number Called: [phone] (home)     Message: Appt scheduled for 2/13/19 @ 8:20 AM with PCP.     Left Message for patient to call back: no

## 2019-02-13 ENCOUNTER — OFFICE VISIT (OUTPATIENT)
Dept: MEDICAL GROUP | Facility: PHYSICIAN GROUP | Age: 59
End: 2019-02-13
Payer: COMMERCIAL

## 2019-02-13 VITALS
DIASTOLIC BLOOD PRESSURE: 60 MMHG | WEIGHT: 137 LBS | TEMPERATURE: 97.3 F | SYSTOLIC BLOOD PRESSURE: 104 MMHG | RESPIRATION RATE: 16 BRPM | OXYGEN SATURATION: 98 % | HEIGHT: 67 IN | HEART RATE: 88 BPM | BODY MASS INDEX: 21.5 KG/M2

## 2019-02-13 DIAGNOSIS — Z82.49 FAMILY HISTORY OF HEART DISEASE: ICD-10-CM

## 2019-02-13 DIAGNOSIS — R68.84 JAW PAIN: ICD-10-CM

## 2019-02-13 DIAGNOSIS — R07.89 OTHER CHEST PAIN: ICD-10-CM

## 2019-02-13 PROCEDURE — 99214 OFFICE O/P EST MOD 30 MIN: CPT | Performed by: FAMILY MEDICINE

## 2019-02-13 NOTE — PROGRESS NOTES
"Subjective:   Jordyn Vidal is a 58 y.o. female here today for jaw and chest pain.  She is unaccompanied for today's visit.    My last visit with \"Elizabeth\" was just last week, but she called our office requesting a stress test be ordered for an episode of jaw and chest pain she experienced a day or 2 before her appointment.  She tells me that she had sudden onset of bilateral jaw pain associated with substernal chest pain.  She described the pain as sharp and rated it as a 7-8 out of 10 in severity.  Denies associated dizziness, lightheadedness, nausea, shortness of breath or chest pain.  She believes the episode lasted for around 5 minutes.  Her  was concerned.  She did not go to the emergency room at the time.  She mentions that her mother does have a history of heart disease, she is in her 80s.  She has never had a stress test before.    She estimates that she has had anywhere from 4-5 similar episodes in the last few years.    Current medicines (including changes today)  Current Outpatient Prescriptions   Medication Sig Dispense Refill   • meloxicam (MOBIC) 15 MG tablet Take 1 Tab by mouth every day for 7 days. 7 Tab 0   • ALPRAZolam (XANAX) 0.25 MG Tab Take 1 Tab by mouth 2 times a day as needed for Anxiety for up to 30 days. 30 Tab 1   • levothyroxine (SYNTHROID) 125 MCG Tab Take 1 tablet PO daily on Monday through Saturdays. Take 1/2 tablet PO daily on Sundays. 90 Tab 3   • triamcinolone acetonide (KENALOG) 0.1 % Cream Apply thin layer to affected areas twice a day for up to two weeks.  May resume after two weeks of no use. 30 g 2   • diphenhydrAMINE (BENADRYL) 25 MG TABS Take 25 mg by mouth every evening.     • loratadine (CLARITIN) 10 MG TABS Take 10 mg by mouth every day.       No current facility-administered medications for this visit.      She  has a past medical history of Allergy; Anesthesia; Anxiety; Cancer ([facility]) (10/2013); Indigestion; and Unspecified disorder of thyroid (2013).    ROS   See HPI. " " No fever, no chills, no jaw pain with chewing, no shortness of breath, no abdominal pain.     Objective:     Physical Exam:  Blood pressure 104/60, pulse 88, temperature 36.3 °C (97.3 °F), resp. rate 16, height 1.702 m (5' 7\"), weight 62.1 kg (137 lb), SpO2 98 %, not currently breastfeeding. Body mass index is 21.46 kg/m².   Constitutional: Alert, no distress, non-toxic appearance.  Skin: Warm, dry, good turgor, no rashes in visible areas.  Eye: Equal, round and reactive, conjunctiva clear, lids normal.  ENMT: Lips without lesions, good dentition, oropharynx clear, no TMJ tenderness or clicking.  Neck: Trachea midline, no masses, no thyromegaly. No cervical or supraclavicular lymphadenopathy.  Respiratory: Unlabored respiratory effort, lungs clear to auscultation, no wheezes, no rhonchi.  Cardiovascular: Normal S1, S2, no murmur, no edema. No chest wall tenderness.  Abdomen: Soft, non-tender, no masses, no hepatosplenomegaly.  Psych: Alert and oriented x3, normal affect and mood.    EKG interpretation by me: NSR. HR is 80. Axis is normal. No ST or QRS morphologic changes. No T-wave inversions. AK and QT intervals are normal. Quality of EKG is good. There is some baseline wander.    Assessment and Plan:     1. Jaw pain  2. Other chest pain  3. Family history of heart disease  This is a new problem to me today, but has been occurring occasionally for the last few years.  The jaw pain is not mechanical in nature.  No evidence of TMJ.  She is having accompanying substernal chest pain as well.  I am concerned for a cardiac etiology.  Resting EKG in the office is unremarkable.  There is a family history of cardiac disease.  Stress test ordered.  Strict ER precautions (chest pain, shortness of breath, syncope) given.  - EKG; Future  - Cardiac Stress Test Treadmill Only; Future    Followup: After stress test.         PLEASE NOTE: This dictation was created using voice recognition software. I have made every reasonable " attempt to correct obvious errors, but I expect that there are errors of grammar and possibly content that I did not discover before finalizing the note.

## 2019-02-14 DIAGNOSIS — R07.89 OTHER CHEST PAIN: ICD-10-CM

## 2019-02-19 ENCOUNTER — NON-PROVIDER VISIT (OUTPATIENT)
Dept: CARDIOLOGY | Facility: MEDICAL CENTER | Age: 59
End: 2019-02-19
Attending: FAMILY MEDICINE
Payer: COMMERCIAL

## 2019-02-19 VITALS
HEART RATE: 91 BPM | WEIGHT: 134 LBS | HEIGHT: 61 IN | DIASTOLIC BLOOD PRESSURE: 72 MMHG | SYSTOLIC BLOOD PRESSURE: 106 MMHG | BODY MASS INDEX: 25.3 KG/M2 | OXYGEN SATURATION: 100 %

## 2019-02-19 DIAGNOSIS — Z82.49 FAMILY HISTORY OF HEART DISEASE: ICD-10-CM

## 2019-02-19 DIAGNOSIS — R68.84 JAW PAIN: ICD-10-CM

## 2019-02-19 DIAGNOSIS — R07.89 OTHER CHEST PAIN: ICD-10-CM

## 2019-02-19 LAB — TREADMILL STRESS: NORMAL

## 2019-02-19 PROCEDURE — 93015 CV STRESS TEST SUPVJ I&R: CPT | Performed by: INTERNAL MEDICINE

## 2019-04-02 ENCOUNTER — APPOINTMENT (OUTPATIENT)
Dept: RADIOLOGY | Facility: MEDICAL CENTER | Age: 59
End: 2019-04-02
Attending: FAMILY MEDICINE
Payer: COMMERCIAL

## 2019-04-10 ENCOUNTER — HOSPITAL ENCOUNTER (OUTPATIENT)
Dept: RADIOLOGY | Facility: MEDICAL CENTER | Age: 59
End: 2019-04-10
Attending: FAMILY MEDICINE
Payer: COMMERCIAL

## 2019-04-10 DIAGNOSIS — Z12.39 BREAST CANCER SCREENING: ICD-10-CM

## 2019-04-10 PROCEDURE — 77063 BREAST TOMOSYNTHESIS BI: CPT

## 2019-07-08 ENCOUNTER — PATIENT MESSAGE (OUTPATIENT)
Dept: MEDICAL GROUP | Facility: PHYSICIAN GROUP | Age: 59
End: 2019-07-08

## 2019-07-08 ENCOUNTER — HOSPITAL ENCOUNTER (OUTPATIENT)
Dept: LAB | Facility: MEDICAL CENTER | Age: 59
End: 2019-07-08
Attending: FAMILY MEDICINE
Payer: COMMERCIAL

## 2019-07-08 DIAGNOSIS — R53.82 CHRONIC FATIGUE: ICD-10-CM

## 2019-07-08 DIAGNOSIS — L80 VITILIGO: ICD-10-CM

## 2019-07-08 LAB
25(OH)D3 SERPL-MCNC: 17 NG/ML (ref 30–100)
ALBUMIN SERPL BCP-MCNC: 4.4 G/DL (ref 3.2–4.9)
ALBUMIN/GLOB SERPL: 1.4 G/DL
ALP SERPL-CCNC: 95 U/L (ref 30–99)
ALT SERPL-CCNC: 19 U/L (ref 2–50)
ANION GAP SERPL CALC-SCNC: 5 MMOL/L (ref 0–11.9)
AST SERPL-CCNC: 16 U/L (ref 12–45)
BASOPHILS # BLD AUTO: 0.5 % (ref 0–1.8)
BASOPHILS # BLD: 0.03 K/UL (ref 0–0.12)
BILIRUB SERPL-MCNC: 0.4 MG/DL (ref 0.1–1.5)
BUN SERPL-MCNC: 15 MG/DL (ref 8–22)
CALCIUM SERPL-MCNC: 10.7 MG/DL (ref 8.5–10.5)
CHLORIDE SERPL-SCNC: 106 MMOL/L (ref 96–112)
CO2 SERPL-SCNC: 30 MMOL/L (ref 20–33)
CREAT SERPL-MCNC: 0.6 MG/DL (ref 0.5–1.4)
EOSINOPHIL # BLD AUTO: 0.23 K/UL (ref 0–0.51)
EOSINOPHIL NFR BLD: 4.1 % (ref 0–6.9)
ERYTHROCYTE [DISTWIDTH] IN BLOOD BY AUTOMATED COUNT: 43.2 FL (ref 35.9–50)
FERRITIN SERPL-MCNC: 74.8 NG/ML (ref 10–291)
GLOBULIN SER CALC-MCNC: 3.1 G/DL (ref 1.9–3.5)
GLUCOSE SERPL-MCNC: 93 MG/DL (ref 65–99)
HCT VFR BLD AUTO: 40.8 % (ref 37–47)
HGB BLD-MCNC: 12.8 G/DL (ref 12–16)
IMM GRANULOCYTES # BLD AUTO: 0.01 K/UL (ref 0–0.11)
IMM GRANULOCYTES NFR BLD AUTO: 0.2 % (ref 0–0.9)
IRON SATN MFR SERPL: 16 % (ref 15–55)
IRON SERPL-MCNC: 66 UG/DL (ref 40–170)
LYMPHOCYTES # BLD AUTO: 1.71 K/UL (ref 1–4.8)
LYMPHOCYTES NFR BLD: 30.2 % (ref 22–41)
MCH RBC QN AUTO: 29.3 PG (ref 27–33)
MCHC RBC AUTO-ENTMCNC: 31.4 G/DL (ref 33.6–35)
MCV RBC AUTO: 93.4 FL (ref 81.4–97.8)
MONOCYTES # BLD AUTO: 0.31 K/UL (ref 0–0.85)
MONOCYTES NFR BLD AUTO: 5.5 % (ref 0–13.4)
NEUTROPHILS # BLD AUTO: 3.37 K/UL (ref 2–7.15)
NEUTROPHILS NFR BLD: 59.5 % (ref 44–72)
NRBC # BLD AUTO: 0 K/UL
NRBC BLD-RTO: 0 /100 WBC
PLATELET # BLD AUTO: 298 K/UL (ref 164–446)
PMV BLD AUTO: 11.1 FL (ref 9–12.9)
POTASSIUM SERPL-SCNC: 4.1 MMOL/L (ref 3.6–5.5)
PROT SERPL-MCNC: 7.5 G/DL (ref 6–8.2)
RBC # BLD AUTO: 4.37 M/UL (ref 4.2–5.4)
SODIUM SERPL-SCNC: 141 MMOL/L (ref 135–145)
T4 FREE SERPL-MCNC: 1.15 NG/DL (ref 0.53–1.43)
TIBC SERPL-MCNC: 402 UG/DL (ref 250–450)
TSH SERPL DL<=0.005 MIU/L-ACNC: 0.16 UIU/ML (ref 0.38–5.33)
VIT B12 SERPL-MCNC: 480 PG/ML (ref 211–911)
WBC # BLD AUTO: 5.7 K/UL (ref 4.8–10.8)

## 2019-07-08 PROCEDURE — 36415 COLL VENOUS BLD VENIPUNCTURE: CPT

## 2019-07-08 PROCEDURE — 82306 VITAMIN D 25 HYDROXY: CPT

## 2019-07-08 PROCEDURE — 83540 ASSAY OF IRON: CPT

## 2019-07-08 PROCEDURE — 84439 ASSAY OF FREE THYROXINE: CPT

## 2019-07-08 PROCEDURE — 85025 COMPLETE CBC W/AUTO DIFF WBC: CPT

## 2019-07-08 PROCEDURE — 80053 COMPREHEN METABOLIC PANEL: CPT

## 2019-07-08 PROCEDURE — 82607 VITAMIN B-12: CPT

## 2019-07-08 PROCEDURE — 84443 ASSAY THYROID STIM HORMONE: CPT

## 2019-07-08 PROCEDURE — 83550 IRON BINDING TEST: CPT

## 2019-07-08 PROCEDURE — 82728 ASSAY OF FERRITIN: CPT

## 2019-08-02 ENCOUNTER — APPOINTMENT (RX ONLY)
Dept: URBAN - METROPOLITAN AREA CLINIC 20 | Facility: CLINIC | Age: 59
Setting detail: DERMATOLOGY
End: 2019-08-02

## 2019-08-02 DIAGNOSIS — E85.4 ORGAN-LIMITED AMYLOIDOSIS: ICD-10-CM

## 2019-08-02 DIAGNOSIS — L80 VITILIGO: ICD-10-CM

## 2019-08-02 PROCEDURE — ? PHOTO-DOCUMENTATION

## 2019-08-02 PROCEDURE — ? PRESCRIPTION

## 2019-08-02 PROCEDURE — ? ADDITIONAL NOTES

## 2019-08-02 PROCEDURE — ? PRESCRIPTION SAMPLES PROVIDED

## 2019-08-02 PROCEDURE — 99202 OFFICE O/P NEW SF 15 MIN: CPT

## 2019-08-02 PROCEDURE — ? COUNSELING

## 2019-08-02 RX ORDER — BETAMETHASONE DIPROPIONATE 0.5 MG/G
CREAM TOPICAL
Qty: 1 | Refills: 0 | Status: ERX | COMMUNITY
Start: 2019-08-02

## 2019-08-02 RX ADMIN — BETAMETHASONE DIPROPIONATE 1: 0.5 CREAM TOPICAL at 00:00

## 2019-08-02 ASSESSMENT — LOCATION ZONE DERM
LOCATION ZONE: FINGER
LOCATION ZONE: ARM
LOCATION ZONE: TRUNK

## 2019-08-02 ASSESSMENT — LOCATION DETAILED DESCRIPTION DERM
LOCATION DETAILED: RIGHT ELBOW
LOCATION DETAILED: INFERIOR THORACIC SPINE
LOCATION DETAILED: LEFT ELBOW
LOCATION DETAILED: RIGHT PROXIMAL DORSAL MIDDLE FINGER

## 2019-08-02 ASSESSMENT — LOCATION SIMPLE DESCRIPTION DERM
LOCATION SIMPLE: LEFT ELBOW
LOCATION SIMPLE: UPPER BACK
LOCATION SIMPLE: RIGHT ELBOW
LOCATION SIMPLE: RIGHT MIDDLE FINGER

## 2019-08-02 ASSESSMENT — SEVERITY VITILIGO: VITILIGO SEVERITY: 2

## 2019-08-02 NOTE — PROCEDURE: ADDITIONAL NOTES
Additional Notes: If no improvement noted after topical treatment. Will start prior authorization for laser treatment (xtrac).\\nRecommend ginkgo biloba 40mg 3 times a day. Advice patient not to use aspirin, ibuprofen with ginkgo biloba.
Detail Level: Detailed

## 2019-10-22 DIAGNOSIS — F41.1 GENERALIZED ANXIETY DISORDER: ICD-10-CM

## 2019-10-22 RX ORDER — ALPRAZOLAM 0.25 MG/1
0.25 TABLET ORAL 2 TIMES DAILY PRN
Qty: 30 TAB | Refills: 1 | Status: SHIPPED
Start: 2019-10-22 | End: 2021-01-04 | Stop reason: SDUPTHER

## 2019-10-22 NOTE — TELEPHONE ENCOUNTER
Was the patient seen in the last year in this department? Yes    Does patient have an active prescription for medications requested? No     Received Request Via: Pharmacy          LOLITA PORTER     Age: 59  demographics  Data as of: 10/22/2019              NARCOTIC 030    Created with Raphaël [id number]   SEDATIVE 070       STIMULANT 000       NARxSCORES can range from 000 to 999. The first two digits represent the composite percentile risk based on an overall analysis of prescription drug use. The third digit represents the number of active prescriptions. The distribution of scores in the population is such that approximately 75% fall below 200, 95% fall below 500 and 99% fall below 650. The information on this report is not warranted as accurate or complete. This report is based on the search criteria supplied and the data entered by the dispensing pharmacy. For more information about any prescription, please contact the dispensing pharmacy or the prescriber. NARxSCORES and Reports are intended to aid, not replace medical decision making. None of the information presented should be used as sole justification for providing or refusing to provide medications.       RX GRAPH Grayed out drugs could not be included in score calculations.   [x] Narcotic [x] Sedative [x] Stimulant [x] Buprenorphine [x] Other    Created with Raphaël 2.2.0All Prescribers  Created with Raphaël [id number] - Bety Harvey2 - Kirby RAWLS  Lorazepam MgEq (LME)  Created with Raphaël [id number]  Created with Raphaël [id number]  *Per CDC guidance, the MME conversion factors prescribed or provided as part of the medication-assisted treatment for opioid use disorder should not be used to benchmark against dosage thresholds meant for opioids prescribed for pain. Buprenorphine products have no agreed upon morphine equivalency, and as partial opioid agonists, are not  expected to be associated with overdose risk in the same dose-dependent manner as doses for full agonist opioids. MME = morphine milligram equivalents. LME = Lorazepam milligram equivalents. mg = dose in milligrams.     Data Analysis   Narcotics (030) 2 months 6 months 1 year 2 years   Prescribers (narcotic, sedative) 0  0 0  0 1  8 2  11   Pharmacies (narcotic, sedative) 0  0 0  0 1  13 2  19   Morphine mg 0  0 0  0 0  0 0  0   Morphine overlap (1) 0  0 0  0 0  0 0  0           Sedatives (070) 2 months 6 months 1 year 2 years   Prescribers (narcotic, sedative) 0  0 0  0 1  8 2  11   Pharmacies (narcotic, sedative) 0  0 0  0 1  13 2  19   Sedative mg 0  0 0  0 15  23 38  36   Sedative overlap (1) 0  0 0  0 0  0 0  0           Stimulants (000)  2 months 6 months 1 year 2 years   Prescribers (stimulant) 0  0 0  0 0  0 0  0   Pharmacies (stimulant) 0  0 0  0 0  0 0  0   Stimulant days 0  0 0  0 0  0 0  0   Stimulant overlap (1) 0  0 0  0 0  0 0  0      (1) Number of days for which a simliar type of medication was prescribed from different prescribers for use on the same day.         Summary   Total Prescriptions: 2   Total Prescribers: 2   Total Pharmacies: 2   Narcotics*    (excluding buprenorphine)  Current Qty: 0   Current MME/day: 0.00   30 Day Avg MME/day: 0.00   Buprenorphine*   Current Qty: 0   Current mg/day: 0.00   30 Day Avg mg/day: 0.00   Sedatives*   Current Qty: 0   Current LME/day: 0.00   30 Day Avg LME/day: 0.00   *Highlighted drugs could not be included in score calculations   PRESCRIPTIONS  Total Prescriptions: 2   Total Private Pay: 2   Fill Date ID Written Drug Qty Days Prescriber Rx # Pharmacy Refill Daily Dose * Pymt Type    02/08/2019  1   02/08/2019  Alprazolam 0.25 MG Tablet  30.00 30 [facility]  [id number]   Mina (3013)  0  0.50 LME  Private Pay  [place]   03/14/2018  2   03/09/2018  Alprazolam 0.25 MG Tablet  45.00 60 Ja [facility]  [id number]   Cos ([id number])  0  0.38  LME  Private Pay  [place]   *Per CDC guidance, the MME conversion factors prescribed or provided as part of the medication-assisted treatment for opioid use disorder should not be used to benchmark against dosage thresholds meant for opioids prescribed for pain. Buprenorphine products have no agreed upon morphine equivalency, and as partial opioid agonists, are not expected to be associated with overdose risk in the same dose-dependent manner as doses for full agonist opioids. MME = morphine milligram equivalents. LME = Lorazepam milligram equivalents. MG = dose in milligrams.   PROVIDERS  Total Providers: 2   Name Address City State Zipcode Phone   Bety Harvey [address], [address]  [place] [address]    [facility] [address] [place] [address]    PHARMACIES  Total Pharmacies: 2   Name Address City State Iterate Studiocode Phone   [facility] [phone] [place] [address] [phone]   [facility] [phone] [facility] [address] [place] [address] [phone]

## 2019-10-22 NOTE — TELEPHONE ENCOUNTER
----- Message from Jordyn Vidal sent at 10/22/2019  1:54 PM PDT -----  Regarding: Prescription Question  Contact: [phone]  Hi Dr. Harvey, I need a refill for my Alprozolam, would you please send to [facility] branch please? I only request 2 refills a year.    Thank you.    Elizabeth Vidal

## 2019-11-27 ENCOUNTER — HOSPITAL ENCOUNTER (OUTPATIENT)
Dept: LAB | Facility: MEDICAL CENTER | Age: 59
End: 2019-11-27
Attending: FAMILY MEDICINE
Payer: COMMERCIAL

## 2019-11-27 ENCOUNTER — OFFICE VISIT (OUTPATIENT)
Dept: MEDICAL GROUP | Facility: PHYSICIAN GROUP | Age: 59
End: 2019-11-27
Payer: COMMERCIAL

## 2019-11-27 VITALS
OXYGEN SATURATION: 98 % | BODY MASS INDEX: 25.71 KG/M2 | HEART RATE: 84 BPM | RESPIRATION RATE: 16 BRPM | DIASTOLIC BLOOD PRESSURE: 70 MMHG | WEIGHT: 136.2 LBS | SYSTOLIC BLOOD PRESSURE: 106 MMHG | TEMPERATURE: 97.9 F | HEIGHT: 61 IN

## 2019-11-27 DIAGNOSIS — Z11.59 ENCOUNTER FOR HEPATITIS C SCREENING TEST FOR LOW RISK PATIENT: ICD-10-CM

## 2019-11-27 DIAGNOSIS — E89.0 POSTOPERATIVE HYPOTHYROIDISM: ICD-10-CM

## 2019-11-27 DIAGNOSIS — R07.81 RIB PAIN ON LEFT SIDE: ICD-10-CM

## 2019-11-27 DIAGNOSIS — R59.0 SUBMANDIBULAR LYMPHADENOPATHY: ICD-10-CM

## 2019-11-27 DIAGNOSIS — Z85.850 HISTORY OF THYROID CANCER: ICD-10-CM

## 2019-11-27 DIAGNOSIS — F33.8 SEASONAL AFFECTIVE DISORDER (HCC): ICD-10-CM

## 2019-11-27 LAB
HCV AB S/CO SERPL IA: NEGATIVE
T3FREE SERPL-MCNC: 3.48 PG/ML (ref 2.4–4.2)
T4 FREE SERPL-MCNC: 1.46 NG/DL (ref 0.53–1.43)
TSH SERPL DL<=0.005 MIU/L-ACNC: 0.01 UIU/ML (ref 0.38–5.33)

## 2019-11-27 PROCEDURE — 84481 FREE ASSAY (FT-3): CPT

## 2019-11-27 PROCEDURE — 36415 COLL VENOUS BLD VENIPUNCTURE: CPT

## 2019-11-27 PROCEDURE — 84439 ASSAY OF FREE THYROXINE: CPT

## 2019-11-27 PROCEDURE — 99214 OFFICE O/P EST MOD 30 MIN: CPT | Performed by: FAMILY MEDICINE

## 2019-11-27 PROCEDURE — G0472 HEP C SCREEN HIGH RISK/OTHER: HCPCS

## 2019-11-27 PROCEDURE — 84443 ASSAY THYROID STIM HORMONE: CPT

## 2019-11-27 RX ORDER — LEVOTHYROXINE SODIUM 0.12 MG/1
TABLET ORAL
Qty: 110 TAB | Refills: 3 | Status: SHIPPED | OUTPATIENT
Start: 2019-11-27 | End: 2020-01-27 | Stop reason: SDUPTHER

## 2019-11-27 NOTE — PROGRESS NOTES
"Subjective:   Jordyn Vidal is a 59 y.o. female here today for hypothyroidism, depressed mood, pain under her jaw and left upper abdominal pain.  She is unaccompanied for today's appointment.    Patient has been feeling tired and also having constipation.  Weight also increased ~5 pounds.  She increased her thyroid medication on her own to 1 pill and 1/4 of a pill a day.  Her prescription is currently for levothyroxine 125mcg.  She only has a few pills left.  She orders her medication from [facility].     Every winter, when the days get shorter, she starts to feel depressed.  She tried Zoloft in the past, but didn't find it helpful.    She has been having left upper quadrant abdominal pain for the past several weeks.  It feels, \"like a knife.\"  No radiation.  No associated fever, nausea or diarrhea.  No blood in stool.  Had a colonoscopy yesterday.  One polyp was removed and she did have findings of diverticulosis without infection.    She also describes \"tenderness\" under her jaw and chin.  This started yesterday.  She currently has a cough that she attributes to allergies.  Again, no fever.  No chills or shortness of breath.  She does have a history of thyroid cancer and is s/p thyroidectomy.    Current medicines (including changes today)  Current Outpatient Medications   Medication Sig Dispense Refill   • levothyroxine (SYNTHROID) 125 MCG Tab Take 1-1.25 tablet PO daily. 110 Tab 3   • triamcinolone acetonide (KENALOG) 0.1 % Cream Apply thin layer to affected areas twice a day for up to two weeks.  May resume after two weeks of no use. 30 g 2   • diphenhydrAMINE (BENADRYL) 25 MG TABS Take 25 mg by mouth every evening.     • loratadine (CLARITIN) 10 MG TABS Take 10 mg by mouth every day.       No current facility-administered medications for this visit.      She  has a past medical history of Allergy, Anesthesia, Anxiety, Cancer (HCC) (10/2013), Indigestion, and Unspecified disorder of thyroid (2013).    ROS   See HPI. " "No fever, no chills, no chest pain, no shortness of breath, no blood in stool.     Objective:     Physical Exam:  /70 (BP Location: Right arm, Patient Position: Sitting, BP Cuff Size: Adult)   Pulse 84   Temp 36.6 °C (97.9 °F) (Temporal)   Resp 16   Ht 1.549 m (5' 1\")   Wt 61.8 kg (136 lb 3.2 oz)   SpO2 98%  Body mass index is 25.73 kg/m².   Constitutional: Alert, no distress, non-toxic appearance.  Skin: Warm, dry, good turgor, no rashes in visible areas.  Eye: Equal, round and reactive, conjunctiva clear, lids normal.  ENMT: TM's clear bilaterally, lips without lesions, good dentition, oropharynx clear.  Neck: Trachea midline, no masses, no thyromegaly. +Palpable submandibular and submental lymphadenopathy. The lymph nodes are tender, soft and mobile. No cervical or supraclavicular lymphadenopathy.  Respiratory: Unlabored respiratory effort, lungs clear to auscultation, no wheezes, no rhonchi.  Cardiovascular: Normal S1, S2, no murmur, no edema.  Abdomen: Soft, non-tender, no masses, no hepatosplenomegaly. Tender along anterior lower left ribs without palpable step off.  Psych: Alert and oriented x3, normal affect and mood.    Assessment and Plan:     1. Postoperative hypothyroidism  2. History of thyroid cancer  Chronic, not well controlled.  Patient has adjusted her medication on her own due to hypothyroid symptoms.  Will update prescription and recheck thyroid levels.   - levothyroxine (SYNTHROID) 125 MCG Tab; Take 1-1.25 tablet PO daily.  Dispense: 110 Tab; Refill: 3  - FREE THYROXINE; Future  - T3 FREE; Future  - TSH; Future    3. Seasonal affective disorder (HCC)  This is a recurring issue for the patient.  She does not meet criteria for MDD currently.  Denies crisis.  Her symptoms are on the mild side and she would like to manage this with therapy.  Provided referrals for her.  Will continue to monitor.    4. Rib pain on left side  New, noted on exam.  Other differential to consider is " diverticulosis/itis given recent colonoscopy finding.  However, she does not have systemic symptoms of infection.  She has been dealing with a cough recently.  Encouraged her to use OTC pain relievers as needed.  Return precautions (worsening pain, fever, lasting longer than one month) given.    5. Submandibular lymphadenopathy  New, noted on exam.  Most likely reactive given exam findings.  Instructed patient to notify us if lasting longer than one month.    6. Encounter for hepatitis C screening test for low risk patient  Hep C screening ordered.  - HCV Scrn ( [id number] [employer]); Future    Followup: Return in about 1 year (around 2020) for Annual.         PLEASE NOTE: This dictation was created using voice recognition software. I have made every reasonable attempt to correct obvious errors, but I expect that there are errors of grammar and possibly content that I did not discover before finalizing the note.

## 2019-11-27 NOTE — PATIENT INSTRUCTIONS
Over-the-counter medications for seasonal affective disorder:  -[place] Felipe's Wort   -5-HTP    Therapists:  -Svetlana Mccracken at [facility]  -[facility]  -[facility]

## 2020-03-23 ENCOUNTER — PATIENT MESSAGE (OUTPATIENT)
Dept: MEDICAL GROUP | Facility: PHYSICIAN GROUP | Age: 60
End: 2020-03-23

## 2020-08-30 DIAGNOSIS — E89.0 POSTOPERATIVE HYPOTHYROIDISM: ICD-10-CM

## 2020-08-30 DIAGNOSIS — Z85.850 HISTORY OF THYROID CANCER: ICD-10-CM

## 2020-08-31 RX ORDER — LEVOTHYROXINE SODIUM 0.12 MG/1
125 TABLET ORAL
Qty: 90 TAB | Refills: 1 | Status: SHIPPED | OUTPATIENT
Start: 2020-08-31 | End: 2020-11-20 | Stop reason: DRUGHIGH

## 2020-11-15 ENCOUNTER — PATIENT MESSAGE (OUTPATIENT)
Dept: MEDICAL GROUP | Facility: PHYSICIAN GROUP | Age: 60
End: 2020-11-15

## 2020-11-15 DIAGNOSIS — R53.83 OTHER FATIGUE: ICD-10-CM

## 2020-11-16 ENCOUNTER — HOSPITAL ENCOUNTER (OUTPATIENT)
Dept: LAB | Facility: MEDICAL CENTER | Age: 60
End: 2020-11-16
Attending: FAMILY MEDICINE

## 2020-11-16 DIAGNOSIS — R53.83 OTHER FATIGUE: ICD-10-CM

## 2020-11-16 LAB
ALBUMIN SERPL BCP-MCNC: 4.5 G/DL (ref 3.2–4.9)
ALBUMIN/GLOB SERPL: 1.7 G/DL
ALP SERPL-CCNC: 102 U/L (ref 30–99)
ALT SERPL-CCNC: 22 U/L (ref 2–50)
ANION GAP SERPL CALC-SCNC: 8 MMOL/L (ref 7–16)
AST SERPL-CCNC: 15 U/L (ref 12–45)
BASOPHILS # BLD AUTO: 0.4 % (ref 0–1.8)
BASOPHILS # BLD: 0.02 K/UL (ref 0–0.12)
BILIRUB SERPL-MCNC: 0.3 MG/DL (ref 0.1–1.5)
BUN SERPL-MCNC: 16 MG/DL (ref 8–22)
CALCIUM SERPL-MCNC: 9.8 MG/DL (ref 8.5–10.5)
CHLORIDE SERPL-SCNC: 105 MMOL/L (ref 96–112)
CO2 SERPL-SCNC: 29 MMOL/L (ref 20–33)
CREAT SERPL-MCNC: 0.5 MG/DL (ref 0.5–1.4)
EOSINOPHIL # BLD AUTO: 0.24 K/UL (ref 0–0.51)
EOSINOPHIL NFR BLD: 4.4 % (ref 0–6.9)
ERYTHROCYTE [DISTWIDTH] IN BLOOD BY AUTOMATED COUNT: 44.2 FL (ref 35.9–50)
FASTING STATUS PATIENT QL REPORTED: NORMAL
GLOBULIN SER CALC-MCNC: 2.7 G/DL (ref 1.9–3.5)
GLUCOSE SERPL-MCNC: 94 MG/DL (ref 65–99)
HCT VFR BLD AUTO: 37.5 % (ref 37–47)
HGB BLD-MCNC: 11.7 G/DL (ref 12–16)
IMM GRANULOCYTES # BLD AUTO: 0.01 K/UL (ref 0–0.11)
IMM GRANULOCYTES NFR BLD AUTO: 0.2 % (ref 0–0.9)
LYMPHOCYTES # BLD AUTO: 1.57 K/UL (ref 1–4.8)
LYMPHOCYTES NFR BLD: 28.5 % (ref 22–41)
MCH RBC QN AUTO: 28.7 PG (ref 27–33)
MCHC RBC AUTO-ENTMCNC: 31.2 G/DL (ref 33.6–35)
MCV RBC AUTO: 91.9 FL (ref 81.4–97.8)
MONOCYTES # BLD AUTO: 0.32 K/UL (ref 0–0.85)
MONOCYTES NFR BLD AUTO: 5.8 % (ref 0–13.4)
NEUTROPHILS # BLD AUTO: 3.34 K/UL (ref 2–7.15)
NEUTROPHILS NFR BLD: 60.7 % (ref 44–72)
NRBC # BLD AUTO: 0 K/UL
NRBC BLD-RTO: 0 /100 WBC
PLATELET # BLD AUTO: 341 K/UL (ref 164–446)
PMV BLD AUTO: 11.8 FL (ref 9–12.9)
POTASSIUM SERPL-SCNC: 3.9 MMOL/L (ref 3.6–5.5)
PROT SERPL-MCNC: 7.2 G/DL (ref 6–8.2)
RBC # BLD AUTO: 4.08 M/UL (ref 4.2–5.4)
SODIUM SERPL-SCNC: 142 MMOL/L (ref 135–145)
T4 FREE SERPL-MCNC: 2 NG/DL (ref 0.93–1.7)
TSH SERPL DL<=0.005 MIU/L-ACNC: 0.03 UIU/ML (ref 0.38–5.33)
VIT B12 SERPL-MCNC: 869 PG/ML (ref 211–911)
WBC # BLD AUTO: 5.5 K/UL (ref 4.8–10.8)

## 2020-11-16 PROCEDURE — 80053 COMPREHEN METABOLIC PANEL: CPT

## 2020-11-16 PROCEDURE — 84443 ASSAY THYROID STIM HORMONE: CPT

## 2020-11-16 PROCEDURE — 82607 VITAMIN B-12: CPT

## 2020-11-16 PROCEDURE — 36415 COLL VENOUS BLD VENIPUNCTURE: CPT

## 2020-11-16 PROCEDURE — 85025 COMPLETE CBC W/AUTO DIFF WBC: CPT

## 2020-11-16 PROCEDURE — 84439 ASSAY OF FREE THYROXINE: CPT

## 2020-11-16 NOTE — TELEPHONE ENCOUNTER
From: Jordyn Vidal  To: Bety Harvey M.D.  Sent: 11/15/2020 11:41 AM PST  Subject: Non-Urgent Medical Question    Dear Dr. Harvey;    I hope you are your family have been safe throughout this pandemic. I am writing to you because I think I need a blood test, I am unusually lethargic, want to stay in bed all day and get tired really fast. I had a tummy tuck almost 4 weeks ago and I imagine some of it might have to do with that, but I feel I am getting worst instead of getting better. Also, I am getting insurance again for next year and can come for a full check up in January if you think it is necessary.    Please advise.    Thank you.    Jordyn Vidal

## 2021-01-04 ENCOUNTER — OFFICE VISIT (OUTPATIENT)
Dept: MEDICAL GROUP | Facility: PHYSICIAN GROUP | Age: 61
End: 2021-01-04
Payer: COMMERCIAL

## 2021-01-04 VITALS
HEIGHT: 72 IN | OXYGEN SATURATION: 96 % | BODY MASS INDEX: 17.86 KG/M2 | SYSTOLIC BLOOD PRESSURE: 100 MMHG | HEART RATE: 90 BPM | DIASTOLIC BLOOD PRESSURE: 60 MMHG | WEIGHT: 131.84 LBS | TEMPERATURE: 97.5 F

## 2021-01-04 DIAGNOSIS — J31.0 CHRONIC RHINITIS: ICD-10-CM

## 2021-01-04 DIAGNOSIS — F41.1 GENERALIZED ANXIETY DISORDER: ICD-10-CM

## 2021-01-04 DIAGNOSIS — H93.13 TINNITUS AURIUM, BILATERAL: ICD-10-CM

## 2021-01-04 DIAGNOSIS — K59.00 CONSTIPATION, UNSPECIFIED CONSTIPATION TYPE: ICD-10-CM

## 2021-01-04 DIAGNOSIS — J34.3 NASAL TURBINATE HYPERTROPHY: ICD-10-CM

## 2021-01-04 DIAGNOSIS — R19.5 ABNORMAL STOOL CALIBER: ICD-10-CM

## 2021-01-04 DIAGNOSIS — Z12.31 ENCOUNTER FOR SCREENING MAMMOGRAM FOR MALIGNANT NEOPLASM OF BREAST: ICD-10-CM

## 2021-01-04 DIAGNOSIS — E89.0 POSTOPERATIVE HYPOTHYROIDISM: ICD-10-CM

## 2021-01-04 PROCEDURE — 99214 OFFICE O/P EST MOD 30 MIN: CPT | Performed by: FAMILY MEDICINE

## 2021-01-04 RX ORDER — LEVOTHYROXINE SODIUM 112 MCG
112 TABLET ORAL
Qty: 30 TAB | Refills: 0 | Status: SHIPPED | OUTPATIENT
Start: 2021-01-04 | End: 2021-01-04 | Stop reason: SDUPTHER

## 2021-01-04 RX ORDER — ALPRAZOLAM 0.25 MG/1
0.25 TABLET ORAL 2 TIMES DAILY PRN
Qty: 30 TAB | Refills: 0 | Status: SHIPPED | OUTPATIENT
Start: 2021-01-04 | End: 2021-04-12 | Stop reason: SDUPTHER

## 2021-01-04 RX ORDER — LEVOTHYROXINE SODIUM 112 MCG
112 TABLET ORAL
Qty: 90 TAB | Refills: 1 | Status: SHIPPED | OUTPATIENT
Start: 2021-01-04 | End: 2021-03-03 | Stop reason: DRUGHIGH

## 2021-01-04 ASSESSMENT — FIBROSIS 4 INDEX: FIB4 SCORE: 0.56

## 2021-01-04 ASSESSMENT — PATIENT HEALTH QUESTIONNAIRE - PHQ9: CLINICAL INTERPRETATION OF PHQ2 SCORE: 0

## 2021-01-04 NOTE — PROGRESS NOTES
Subjective:      GIOVANI Vidal is a 60 y.o. female who presents with Constipation (over a month)            HPI     This is a 60-year-old female who is a patient of Dr. Harvey.  Dr. Harvey is not currently available.  She is here because of multiple complaints.    She said she has been constipated since she had a tummy tuck in October 2020.  She said she has been taking MiraLAX on a daily basis.  She was out of town and was not able to take the MiraLAX last week.  She was very constipated and had to take 3 laxatives 5 days ago.  She said she finally had a bowel movement 4 days ago but she has not had bowel movement since then.  She noticed that her stool is very narrow like a pencil and they come in small pieces.  Denies any blood in the stool.  Her last colonoscopy was in November 2019 that showed 1 polyp which was a tubular adenoma and diverticulosis in the sigmoid and descending colon.  She has read on the type of stool that she has and she is worried that she may have a blockage in the colon.    She also has been having ringing of both ears of over 6 months duration.  Less than 6 months ago aside from the ringing of the ears she also started having chirping noise in the right ear only.  Denies hearing loss.  Denies ear pain.    She also complains of constant runny nose which is clear in color.  She said she has seasonal allergies usually in the summer spring and fall.  She said she does not usually have any problems in the wintertime at this time her symptoms went on until the present time.  She has been taking Claritin daily but this has not been working.  She said she also has been using a nose spray which she got over-the-counter but she could not tell the name or the main ingredient.    Her PCP adjusted her levothyroxine dose from 125 down to 112 mcg daily in November 2020 because her her last TSH came back she was over replaced.  Patient with history of thyroid cancer status post thyroidectomy.  Her TSH should be  slightly suppressed due to history of thyroid cancer.  She said what she has been doing wants she was just cuuting off a small portion of her levothyroxine 125 mcg tablet since November.  She prefers to have a brand Synthroid because she said the generic levothyroxine makes her gag when she swallows the tablet.  She wants a 30-day supply sent to a local pharmacy.  She needs a printed prescription for 90-day supply because she is going to get it from [facility].    She suffers from generalized anxiety disorder and her PCP prescribes her alprazolam 0.25 mg 1 tablet at night as needed.  She said in the past she was only taking it twice a month but in the last 2 months she has been taking it twice a week because of increase in her problems with insomnia due to stress.  She needs a refill of the medication.  The last refill was in October 2020 for number 30 tablets.    She is also due for screening mammogram.  The last one was in April 2019.      Past medical history, past surgical history, family history reviewed-no changes    Social history reviewed-no changes    Allergies reviewed-no changes    Medications reviewed-no changes    ROS     Per HPI, the rest are negative.       Objective:     /60 (BP Location: Left arm, Patient Position: Sitting, BP Cuff Size: Adult)   Pulse 90   Temp 36.4 °C (97.5 °F) (Temporal)   Ht 1.829 m (6')   Wt 59.8 kg (131 lb 13.4 oz)   SpO2 96%   BMI 17.88 kg/m²      Physical Exam     Examined alert, awake, oriented, not in distress    Ears-left tympanic membrane intact without evidence of infection with good cone of light, right tympanic membrane not visible because of cerumen blocking the canal  Nose-mildly enlarged turbinates left nostril without any obstruction, enlarged turbinates right nostril with complete obstruction, clear nasal discharge both nostrils  Neck-supple, no lymphadenopathy, no thyromegaly  Lungs-clear to auscultation, no rales, no wheezes  Heart-regular rate and  rhythm, no murmur  Abdomen-scar from recent tummy tuck surgery, good bowel sounds, abdominal wall tight/firm, no hepatosplenomegaly, no masses, no tenderness  Extremities-no edema, clubbing, cyanosis            Assessment/Plan:        1. Constipation, unspecified constipation type  She has been having constipation since October with change in the caliber of the stool.  Referral placed to gastroenterology for further evaluation.  Advised to start back on MiraLAX and take a scoop with a full glass of water twice a day regularly and if she starts having 2 or more loose stools a day she can decrease to once a day.  Advised to increase intake of natural sources of fiber and increase water intake.  - REFERRAL TO GASTROENTEROLOGY    2. Abnormal stool caliber  Referral placed to gastroenterology.  - REFERRAL TO GASTROENTEROLOGY    3. Tinnitus aurium, bilateral  Referral placed to ENT for further evaluation and treatment.  - REFERRAL TO ENT    4. Chronic rhinitis  Referral placed to ENT.  She will message me the contents of the nose spray she is using.  If she is not yet on steroid nasal spray then I will send 1 to her pharmacy.  - REFERRAL TO ENT    5. Nasal turbinate hypertrophy  Referral placed to ENT.  Plan the same as #4.  - REFERRAL TO ENT    6. Postoperative hypothyroidism  Per her request prescription for brand Synthroid was sent to local pharmacy for 30-day supply and 90-day supply prescription was printed with 1 refill so she can get this from [facility].  She will do follow-up TSH 8 weeks from the time she started the exact dose of Synthroid 112 mcg.  - SYNTHROID 112 MCG Tab; Take 1 Tab by mouth Every morning on an empty stomach.  Dispense: 90 Tab; Refill: 1  - TSH; Future    7. Encounter for screening mammogram for malignant neoplasm of breast  Screening mammogram ordered.  - MA-SCREENING MAMMO BILAT W/CAD; Future    8. Generalized anxiety disorder  She has increased usage from twice a month to twice a week because  of increase in stress causing increased insomnia.  I refilled her alprazolam.  - ALPRAZolam (XANAX) 0.25 MG Tab; Take 1 Tab by mouth 2 times a day as needed for Anxiety for up to 30 days.  Dispense: 30 Tab; Refill: 0    She will keep her follow-up appointment with her PCP in March 2021.      Please note that this dictation was created using voice recognition software. I have worked with consultants from the vendor as well as technical experts from [facility]  [employer] to optimize the interface. I have made every reasonable attempt to correct obvious errors, but I expect that there are errors of grammar and possibly content I did not discover before finalizing the note.

## 2021-02-08 ENCOUNTER — TELEPHONE (OUTPATIENT)
Dept: MEDICAL GROUP | Facility: PHYSICIAN GROUP | Age: 61
End: 2021-02-08

## 2021-02-08 DIAGNOSIS — K59.00 CONSTIPATION, UNSPECIFIED CONSTIPATION TYPE: ICD-10-CM

## 2021-02-08 DIAGNOSIS — R19.5 ABNORMAL STOOL CALIBER: ICD-10-CM

## 2021-02-08 NOTE — HPI: DISCOLORATION
How Severe Is Your Skin Discoloration?: moderate expressive aphasia and word finding however pt able to make needs known/100% of the time/able to follow single-step instructions 100% of the time/able to follow single-step instructions/aphasia/oral apraxia

## 2021-02-08 NOTE — TELEPHONE ENCOUNTER
I have already done a referral to [facility] in January 2021.  Any reason why she prefers GI consultants specifically Dr. Yanez?

## 2021-02-12 ENCOUNTER — HOSPITAL ENCOUNTER (OUTPATIENT)
Dept: RADIOLOGY | Facility: MEDICAL CENTER | Age: 61
End: 2021-02-12
Attending: FAMILY MEDICINE
Payer: COMMERCIAL

## 2021-02-12 DIAGNOSIS — Z12.31 VISIT FOR SCREENING MAMMOGRAM: ICD-10-CM

## 2021-02-12 PROCEDURE — 77063 BREAST TOMOSYNTHESIS BI: CPT

## 2021-02-13 ENCOUNTER — HOSPITAL ENCOUNTER (OUTPATIENT)
Dept: LAB | Facility: MEDICAL CENTER | Age: 61
End: 2021-02-13
Attending: PHYSICIAN ASSISTANT
Payer: COMMERCIAL

## 2021-02-13 LAB
ALBUMIN SERPL BCP-MCNC: 4.4 G/DL (ref 3.2–4.9)
ALBUMIN/GLOB SERPL: 1.4 G/DL
ALP SERPL-CCNC: 105 U/L (ref 30–99)
ALT SERPL-CCNC: 12 U/L (ref 2–50)
ANION GAP SERPL CALC-SCNC: 12 MMOL/L (ref 7–16)
AST SERPL-CCNC: 13 U/L (ref 12–45)
BASOPHILS # BLD AUTO: 0.4 % (ref 0–1.8)
BASOPHILS # BLD: 0.02 K/UL (ref 0–0.12)
BILIRUB SERPL-MCNC: 0.4 MG/DL (ref 0.1–1.5)
BUN SERPL-MCNC: 17 MG/DL (ref 8–22)
CALCIUM SERPL-MCNC: 9.7 MG/DL (ref 8.5–10.5)
CHLORIDE SERPL-SCNC: 107 MMOL/L (ref 96–112)
CO2 SERPL-SCNC: 26 MMOL/L (ref 20–33)
CREAT SERPL-MCNC: 0.51 MG/DL (ref 0.5–1.4)
EOSINOPHIL # BLD AUTO: 0.16 K/UL (ref 0–0.51)
EOSINOPHIL NFR BLD: 3.1 % (ref 0–6.9)
ERYTHROCYTE [DISTWIDTH] IN BLOOD BY AUTOMATED COUNT: 45.3 FL (ref 35.9–50)
FASTING STATUS PATIENT QL REPORTED: NORMAL
GLOBULIN SER CALC-MCNC: 3.1 G/DL (ref 1.9–3.5)
GLUCOSE SERPL-MCNC: 106 MG/DL (ref 65–99)
HCT VFR BLD AUTO: 39.8 % (ref 37–47)
HGB BLD-MCNC: 12.4 G/DL (ref 12–16)
IMM GRANULOCYTES # BLD AUTO: 0.01 K/UL (ref 0–0.11)
IMM GRANULOCYTES NFR BLD AUTO: 0.2 % (ref 0–0.9)
LYMPHOCYTES # BLD AUTO: 1.49 K/UL (ref 1–4.8)
LYMPHOCYTES NFR BLD: 29.2 % (ref 22–41)
MCH RBC QN AUTO: 28.6 PG (ref 27–33)
MCHC RBC AUTO-ENTMCNC: 31.2 G/DL (ref 33.6–35)
MCV RBC AUTO: 91.9 FL (ref 81.4–97.8)
MONOCYTES # BLD AUTO: 0.27 K/UL (ref 0–0.85)
MONOCYTES NFR BLD AUTO: 5.3 % (ref 0–13.4)
NEUTROPHILS # BLD AUTO: 3.16 K/UL (ref 2–7.15)
NEUTROPHILS NFR BLD: 61.8 % (ref 44–72)
NRBC # BLD AUTO: 0 K/UL
NRBC BLD-RTO: 0 /100 WBC
PLATELET # BLD AUTO: 303 K/UL (ref 164–446)
PMV BLD AUTO: 11.2 FL (ref 9–12.9)
POTASSIUM SERPL-SCNC: 4.9 MMOL/L (ref 3.6–5.5)
PROT SERPL-MCNC: 7.5 G/DL (ref 6–8.2)
RBC # BLD AUTO: 4.33 M/UL (ref 4.2–5.4)
SODIUM SERPL-SCNC: 145 MMOL/L (ref 135–145)
WBC # BLD AUTO: 5.1 K/UL (ref 4.8–10.8)

## 2021-02-13 PROCEDURE — 36415 COLL VENOUS BLD VENIPUNCTURE: CPT

## 2021-02-13 PROCEDURE — 85025 COMPLETE CBC W/AUTO DIFF WBC: CPT

## 2021-02-13 PROCEDURE — 80053 COMPREHEN METABOLIC PANEL: CPT

## 2021-02-14 ENCOUNTER — HOSPITAL ENCOUNTER (OUTPATIENT)
Dept: RADIOLOGY | Facility: MEDICAL CENTER | Age: 61
End: 2021-02-14
Attending: PHYSICIAN ASSISTANT
Payer: COMMERCIAL

## 2021-02-14 DIAGNOSIS — K59.00 CONSTIPATION, UNSPECIFIED CONSTIPATION TYPE: ICD-10-CM

## 2021-02-14 DIAGNOSIS — R10.0 ACUTE ABDOMINAL COMPLAINT: ICD-10-CM

## 2021-02-14 PROCEDURE — 700117 HCHG RX CONTRAST REV CODE 255: Performed by: INTERNAL MEDICINE

## 2021-02-14 PROCEDURE — 74177 CT ABD & PELVIS W/CONTRAST: CPT

## 2021-02-14 RX ADMIN — IOHEXOL 100 ML: 350 INJECTION, SOLUTION INTRAVENOUS at 08:11

## 2021-02-14 RX ADMIN — IOHEXOL 25 ML: 240 INJECTION, SOLUTION INTRATHECAL; INTRAVASCULAR; INTRAVENOUS; ORAL at 08:11

## 2021-02-16 ENCOUNTER — PATIENT MESSAGE (OUTPATIENT)
Dept: MEDICAL GROUP | Facility: PHYSICIAN GROUP | Age: 61
End: 2021-02-16

## 2021-02-19 ENCOUNTER — TELEPHONE (OUTPATIENT)
Dept: MEDICAL GROUP | Facility: PHYSICIAN GROUP | Age: 61
End: 2021-02-19

## 2021-02-19 DIAGNOSIS — Q30.9 NOSE ABNORMALITY: ICD-10-CM

## 2021-02-19 NOTE — TELEPHONE ENCOUNTER
No appointment needed.  Can we call their office back to see what the consultation is for?  -Bety Harvey M.D.

## 2021-02-19 NOTE — TELEPHONE ENCOUNTER
Received a call from Johanne from [facility]'s office who is a plastic surgeon.    She states they will be seeing pt, and will need a referral due to her insurance, she states this visit will be for a consult but did not state exactly what pt will be consulting about.    Would you like pt to have an patrick to discuss this referral?

## 2021-02-27 DIAGNOSIS — E89.0 POSTOPERATIVE HYPOTHYROIDISM: ICD-10-CM

## 2021-03-01 RX ORDER — LEVOTHYROXINE SODIUM 112 MCG
TABLET ORAL
Qty: 30 TABLET | Refills: 0 | OUTPATIENT
Start: 2021-03-01

## 2021-03-01 NOTE — TELEPHONE ENCOUNTER
See recent [employer] message.  Patient is requesting refill be sent to her so she may send to a [place] pharmacy.  I am clarifying her dose.  -Bety Harvey M.D.

## 2021-04-12 ENCOUNTER — TELEMEDICINE (OUTPATIENT)
Dept: MEDICAL GROUP | Facility: PHYSICIAN GROUP | Age: 61
End: 2021-04-12
Payer: COMMERCIAL

## 2021-04-12 VITALS — WEIGHT: 129 LBS | HEIGHT: 72 IN | BODY MASS INDEX: 17.47 KG/M2

## 2021-04-12 DIAGNOSIS — E89.0 POSTOPERATIVE HYPOTHYROIDISM: ICD-10-CM

## 2021-04-12 DIAGNOSIS — Z00.00 ENCOUNTER FOR PREVENTATIVE ADULT HEALTH CARE EXAMINATION: ICD-10-CM

## 2021-04-12 DIAGNOSIS — K59.09 OTHER CONSTIPATION: ICD-10-CM

## 2021-04-12 DIAGNOSIS — F41.1 GENERALIZED ANXIETY DISORDER: ICD-10-CM

## 2021-04-12 DIAGNOSIS — Z85.850 HISTORY OF THYROID CANCER: ICD-10-CM

## 2021-04-12 PROCEDURE — 99396 PREV VISIT EST AGE 40-64: CPT | Mod: 95,CR | Performed by: FAMILY MEDICINE

## 2021-04-12 RX ORDER — ALPRAZOLAM 0.25 MG/1
0.25 TABLET ORAL 2 TIMES DAILY PRN
Qty: 30 TABLET | Refills: 0 | Status: SHIPPED | OUTPATIENT
Start: 2021-04-12 | End: 2021-08-26 | Stop reason: SDUPTHER

## 2021-04-12 ASSESSMENT — FIBROSIS 4 INDEX: FIB4 SCORE: 0.74

## 2021-04-12 NOTE — PROGRESS NOTES
This evaluation was conducted via Zoom using secure and encrypted videoconferencing technology. The patient was in a private location in the [facility].      The patient's identity was confirmed and verbal consent was obtained for this virtual visit.    Subjective:     CC:   Chief Complaint   Patient presents with   • Annual Exam     HPI:   Jordyn Vidal is a 60 y.o. female who presents for annual exam.    She has been having digestive issues over the last several months.  This mainly involves the constipation and abdominal pain.  She has had imaging and recently had a colonoscopy.  Fortunately, the colonoscopy results were reassuring.  There was some diverticulosis.  She has gone back to eating a small breakfast in the mornings and have noticed some improvement in her constipation.    She continues to take levothyroxine 125 mcg daily for her hypothyroidism that is secondary to thyroidectomy for thyroid cancer.  Her last thyroid hormone levels were in 2020 and did show that she was slightly hyperthyroid.  We had decreased her dose for short period of time.  Shortly after, she started developing some of her digestive issues discussed above.  She also noticed worsening fatigue and hair loss.  She has gone back to the 125 mcg daily and is feeling better on this.    Patient has GYN provider: No   Last Pap Smear: N/A, history of hysterectomy   H/O Abnormal Pap: No  Last Mammogram: 2021  Last Bone Density Test: N/A  Last Colorectal Cancer Screenin2021 colonoscopy, next due in   Last Tdap: Due  Received HPV series: Aged out    Exercise: strenuous regular exercise, aerobic > 3 hours a week  Diet: healthy      No LMP recorded. Patient has had a hysterectomy.  She has not utilized hormone replacement therapy.  Denies any menopausal symptoms.  No significant bloating/fluid retention, pelvic pain, or dyspareunia. No abnormal vaginal discharge.   No breast tenderness, mass, nipple discharge or changes in  size or contour.    OB History    Para Term  AB Living   2 2 0 0 0 2   SAB TAB Ectopic Molar Multiple Live Births   0 0 0 0 0 0      She  reports being sexually active and has had partner(s) who are Male. She reports using the following method of birth control/protection: Surgical.    She  has a past medical history of Allergy, Anesthesia, Anxiety, Cancer ([facility]) (10/2013), Indigestion, and Unspecified disorder of thyroid ().  She  has a past surgical history that includes tonsillectomy; thyroidectomy total (10/19/2013); node dissection (10/19/2013); esophageal motility or manometry (2014); abdominal hysterectomy total (); and liposuction (2014).    Family History   Problem Relation Age of Onset   • Cancer Father      Social History     Tobacco Use   • Smoking status: Never Smoker   • Smokeless tobacco: Never Used   Substance Use Topics   • Alcohol use: Yes     Comment: occ   • Drug use: No       Patient Active Problem List    Diagnosis Date Noted   • Generalized anxiety disorder 2017   • Postoperative hypothyroidism 2017   • History of thyroid cancer 10/19/2013     Current Outpatient Medications   Medication Sig Dispense Refill   • ALPRAZolam (XANAX) 0.25 MG Tab Take 1 tablet by mouth 2 times a day as needed for Anxiety for up to 30 days. 30 tablet 0   • levothyroxine (SYNTHROID) 125 MCG Tab Take 1 tablet by mouth Every morning on an empty stomach. 90 tablet 3   • diphenhydrAMINE (BENADRYL) 25 MG TABS Take 25 mg by mouth every evening.     • loratadine (CLARITIN) 10 MG TABS Take 10 mg by mouth every day.       No current facility-administered medications for this visit.     Allergies   Allergen Reactions   • Codeine      GI intolerance   • Zoloft Nausea     Review of Systems   Constitutional: Negative for fever, chills.  HENT: Negative for congestion.    Eyes: Negative for pain.   Respiratory: Negative for cough and shortness of breath.    Cardiovascular: Negative for chest  pain and leg swelling.   Gastrointestinal: Negative for nausea, vomiting, abdominal pain and diarrhea. +Constipation.  Genitourinary: Negative for dysuria and hematuria.   Skin: Negative for rash.   Neurological: Negative for dizziness, focal weakness and headaches.   Endo/Heme/Allergies: Does not bruise/bleed easily.   Psychiatric/Behavioral: Negative for depression.  The patient is not nervous/anxious.      Objective:   Ht 1.829 m (6')   Wt 58.5 kg (129 lb)   BMI 17.50 kg/m²     Wt Readings from Last 4 Encounters:   04/12/21 58.5 kg (129 lb)   01/04/21 59.8 kg (131 lb 13.4 oz)   11/27/19 61.8 kg (136 lb 3.2 oz)   02/19/19 60.8 kg (134 lb)     Constitutional: Alert, no distress, well-groomed.  Skin: Warm, dry, good turgor, no rashes in visible areas.  Eye: Conjunctiva clear, lids normal.  ENMT: Lips without lesions, good dentition, moist mucous membranes.  Neck: Trachea midline, no masses, no thyromegaly.  Respiratory: Unlabored respiratory effort, no cough.  MSK: Normal gait, moves all extremities.  Psych: Alert and oriented x3, normal affect and mood.    Assessment and Plan:     1. Encounter for preventative adult health care examination  This is a healthy 60-year-old female here today for a preventative exam.  Previous medical history, healthcare maintenance and immunization status reviewed.  Patient is up to date with preventative screenings.  She is due for a few vaccines.  See discussion of anticipatory guidance below.  Patient will return annually for preventative exams.    2. History of thyroid cancer  3. Postoperative hypothyroidism  This is a chronic and stable problem.  Patient has a history of thyroid cancer and is status post thyroidectomy.  She does have postoperative hypothyroidism.  Currently, she is taking levothyroxine 125 mcg.  In the past, I have written a prescription for her which she will send to a [place] pharmacy that is less expensive.  She has not had follow-up testing since November  2020 which showed that she was in a slightly hyperthyroid state.  At that time, we had decreased her dose.  However, she reported to me several weeks later that she was not feeling well with new constipation, worsening fatigue and hair loss.  She went back to her previous dose of levothyroxine 125 mcg and noticed improvement in her fatigue and hair loss.  She is only recently started to see some improvement with her constipation and had a work-up with GI.  I let her know that ultimately, we would like to have her closer to a normal thyroid state with her labs.  I would be in agreement with having her be slightly hyperthyroid given her history of thyroid cancer, however, I did advise her of the concerns with being in a hyperthyroid state for a significant amount of time (atrial fibrillation, heart failure).  I have ordered labs for her and she plans to have this done in the next couple of months to see how she is doing with her current dose.  We also discussed possibly having her take the levothyroxine 6 days a week instead of 7 days a week to help to bring her closer to normal.  - TSH; Future  - FREE THYROXINE; Future    4. Other constipation  This is a chronic issue which is slightly improving.  Patient has started to eat breakfast and has noticed a slight improvement in her bowel movements.  She is also taking a safe over-the-counter supplement.  Recently had a thorough work-up with GI which was reassuring.    5. Generalized anxiety disorder  Chronic and stable.  Patient has anxiety disorder with rare panic attacks.  For this, I have prescribed her a small quantity of alprazolam.  A prescription of 30 tablets will last her anywhere from 4 to 6 months.  She has not needed to take the medication more than twice per week and declines a daily medication at this time.  Her prescription medication history was reviewed and is appropriate.  I do not have any concerns for abuse.  She was cautioned not to drive or drink  alcohol with this medication.  - ALPRAZolam (XANAX) 0.25 MG Tab; Take 1 tablet by mouth 2 times a day as needed for Anxiety for up to 30 days.  Dispense: 30 tablet; Refill: 0    Health maintenance:  Up to date, due for COVID-19 vaccine, tetanus booster and Shingrix vaccines  Labs per orders  Immunizations per orders  Patient counseled about skin care, diet, supplements, and exercise.  Discussed  diet and exercise     Follow-up: 3 months to establish care with new PCP

## 2021-08-26 ENCOUNTER — OFFICE VISIT (OUTPATIENT)
Dept: MEDICAL GROUP | Facility: PHYSICIAN GROUP | Age: 61
End: 2021-08-26
Payer: COMMERCIAL

## 2021-08-26 VITALS
TEMPERATURE: 97.7 F | SYSTOLIC BLOOD PRESSURE: 90 MMHG | HEART RATE: 96 BPM | OXYGEN SATURATION: 95 % | BODY MASS INDEX: 18.12 KG/M2 | WEIGHT: 133.8 LBS | HEIGHT: 72 IN | RESPIRATION RATE: 16 BRPM | DIASTOLIC BLOOD PRESSURE: 58 MMHG

## 2021-08-26 DIAGNOSIS — F41.1 GENERALIZED ANXIETY DISORDER: ICD-10-CM

## 2021-08-26 DIAGNOSIS — E89.0 POSTOPERATIVE HYPOTHYROIDISM: ICD-10-CM

## 2021-08-26 DIAGNOSIS — F34.1 DYSTHYMIA: ICD-10-CM

## 2021-08-26 PROCEDURE — 99214 OFFICE O/P EST MOD 30 MIN: CPT | Performed by: STUDENT IN AN ORGANIZED HEALTH CARE EDUCATION/TRAINING PROGRAM

## 2021-08-26 RX ORDER — ESCITALOPRAM OXALATE 20 MG/1
20 TABLET ORAL DAILY
Qty: 30 TABLET | Refills: 2 | Status: SHIPPED | OUTPATIENT
Start: 2021-08-26 | End: 2021-09-20

## 2021-08-26 RX ORDER — ALPRAZOLAM 0.25 MG/1
0.25 TABLET ORAL 2 TIMES DAILY PRN
Qty: 30 TABLET | Refills: 0 | Status: SHIPPED | OUTPATIENT
Start: 2021-08-26 | End: 2021-09-25

## 2021-08-26 ASSESSMENT — FIBROSIS 4 INDEX: FIB4 SCORE: 0.76

## 2021-08-26 NOTE — PROGRESS NOTES
Subjective:     CC:  Diagnoses of Generalized anxiety disorder, Postoperative hypothyroidism, and Dysthymia were pertinent to this visit.    HISTORY OF THE PRESENT ILLNESS: Patient is a 61 y.o. female. This pleasant patient is here today to establish care and discuss anxiety and depression. Her prior PCP was Bety Harvey MD .    1.  Postoperative hypothyroidism   Reduced and is dose of levothyroxine from 125 mcg 7 days a week to 125 mcg 6 days a week after lab results on 1/16/2020.  He does not report constipation or diarrhea, heat/cold intolerance, but she does report some hair loss.     2.  Generalized anxiety disorder  She feels that this is no more than usual.  She is in need of refills for her alprazolam which she uses very infrequently.    3.  Longstanding low level depression.  She feels that this is been going on most of her adult life is experienced as difficulty staying asleep although not falling asleep.  She feels quotation jamil down quotation jamil feels like she does not enjoy things as much as she should.  She had tried Zoloft in the past and had uncomfortable side effects.    Active Diagnosis:    Patient Active Problem List   Diagnosis   • History of thyroid cancer   • Postoperative hypothyroidism   • Generalized anxiety disorder   • Dysthymia          Current Outpatient Medications Ordered in Epic   Medication Sig Dispense Refill   • ALPRAZolam (XANAX) 0.25 MG Tab Take 1 Tablet by mouth 2 times a day as needed for Anxiety for up to 30 days. 30 Tablet 0   • escitalopram (LEXAPRO) 20 MG tablet Take 1 Tablet by mouth every day. 30 Tablet 2   • levothyroxine (SYNTHROID) 125 MCG Tab Take 1 tablet by mouth Every morning on an empty stomach. 90 tablet 3   • diphenhydrAMINE (BENADRYL) 25 MG TABS Take 25 mg by mouth every evening.     • loratadine (CLARITIN) 10 MG TABS Take 10 mg by mouth every day.       No current [facility]-ordered facility-administered medications on file.     ROS:   Gen: No fevers/chills, no  changes in weight  HEENT: No changes in vision/hearing, sore throat.  Pulm: No cough, unexplained SOB.  CV: No chest pain/pressure, no palpitations  GI: No nausea/vomiting, no diarrhea  : No dysuria/nocturia  MSk: No myalgias  Skin: No rash/skin changes. + Hair loss.  Neuro: No headaches, no numbness/tingling  Heme/Lymph: no easy bruising      Objective:     Exam: BP (!) 90/58 (BP Location: Left arm, Patient Position: Sitting, BP Cuff Size: Adult)   Pulse 96   Temp 36.5 °C (97.7 °F) (Temporal)   Resp 16   Ht 1.829 m (6')   Wt 60.7 kg (133 lb 12.8 oz)   SpO2 95%  Body mass index is 18.15 kg/m².    General: Normal appearing. No distress.  HEENT: Normocephalic. Eyes conjunctiva clear lids without ptosis, pupils equal and reactive to light accommodation. Ears normal shape and contour, canals are clear bilaterally, tympanic membranes are benign, nasal mucosa benign, oropharynx is without erythema, edema or exudates.   Neck: Supple without JVD or bruit. Thyroid is not enlarged.  Pulmonary: Clear to ausculation.  Normal effort. No rales, ronchi, or wheezing.  Cardiovascular: Regular rate and rhythm without murmur. Radial pulses are intact and equal bilaterally.  Abdomen: Soft, nontender, nondistended. Normal bowel sounds. Liver and spleen are not palpable  Neurologic: Grossly nonfocal.  CN II through XII intact.  Lymph: No cervical or supraclavicular lymph nodes are palpable  Skin: Warm and dry.  No obvious lesions.  Musculoskeletal: Normal gait. No extremity cyanosis, clubbing, or edema.  Psych: Normal mood and affect. Alert and oriented x3. Judgment and insight are normal.    A chaperone was offered to the patient during today's exam. Patient declined chaperone.    Labs:   11/16/2020:  -TSH 0.028, free T4 2.0    2/13/2021:  -CBC, WNL  -CMP, glucose elevated at 106 alk phos elevated at 105    Assessment & Plan:   61 y.o. female with the following -    1.  Postoperative hypothyroidism  -We will recheck TSH/free T4  levels and reevaluate her current levothyroxine dosage.    2. Generalized anxiety disorder  -PDMP reviewed, controlled substance contract renewed, this patient is a infrequent low-volume user of alprazolam.  - ALPRAZolam (XANAX) 0.25 MG Tab; Take 1 Tablet by mouth 2 times a day as needed for Anxiety for up to 30 days.  Dispense: 30 Tablet; Refill: 0    3.  Dysthymia  -She wishes to try a different antidepressant rather than Zoloft with which she had a bad experience in the past.  I did offer counseling, she said that she is tried this in the past and it was not valuable to her.  -Escitalopram 20 mg daily, dispense 30, 2 refills.      Return in about 1 year (around 8/26/2022).    Please note that this dictation was created using voice recognition software. I have made every reasonable attempt to correct obvious errors, but I expect that there are errors of grammar and possibly content that I did not discover before finalizing the note.      Anastacio Lyn PA-C 8/26/2021

## 2021-08-30 DIAGNOSIS — L98.9 SKIN LESION: ICD-10-CM

## 2021-09-24 DIAGNOSIS — F41.1 GENERALIZED ANXIETY DISORDER: ICD-10-CM

## 2021-09-24 RX ORDER — DULOXETIN HYDROCHLORIDE 60 MG/1
60 CAPSULE, DELAYED RELEASE ORAL DAILY
Qty: 30 CAPSULE | Refills: 2 | Status: SHIPPED | OUTPATIENT
Start: 2021-09-24 | End: 2021-10-04

## 2021-09-24 NOTE — PROGRESS NOTES
Contacted by the patient on 9/24/2021 via [facility].    She does not like how her current SSRI makes her feel.    She wishes to try Cymbalta.    I have provided Cymbalta 60 mg daily.  30 tablets, 2 refills.    Anastacio Lyn PA-C 9/24/2021

## 2021-09-29 DIAGNOSIS — F41.1 GENERALIZED ANXIETY DISORDER: ICD-10-CM

## 2021-10-04 RX ORDER — DULOXETIN HYDROCHLORIDE 20 MG/1
20 CAPSULE, DELAYED RELEASE ORAL DAILY
Qty: 30 CAPSULE | Refills: 0 | Status: SHIPPED | OUTPATIENT
Start: 2021-10-04 | End: 2021-10-27

## 2021-10-04 NOTE — PROGRESS NOTES
I have been contacted by Mrs. Vidal  by my chart.  She does not want to go visit with psychiatry.  She wishes to try lower dose of Cymbalta.  I have prescribed 20 mg for 30 days.

## 2021-10-18 ENCOUNTER — HOSPITAL ENCOUNTER (OUTPATIENT)
Dept: LAB | Facility: MEDICAL CENTER | Age: 61
End: 2021-10-18
Attending: STUDENT IN AN ORGANIZED HEALTH CARE EDUCATION/TRAINING PROGRAM
Payer: COMMERCIAL

## 2021-10-18 DIAGNOSIS — E89.0 POSTOPERATIVE HYPOTHYROIDISM: ICD-10-CM

## 2021-10-18 LAB
T4 FREE SERPL-MCNC: 1.99 NG/DL (ref 0.93–1.7)
TSH SERPL DL<=0.005 MIU/L-ACNC: 0.09 UIU/ML (ref 0.38–5.33)

## 2021-10-18 PROCEDURE — 84443 ASSAY THYROID STIM HORMONE: CPT

## 2021-10-18 PROCEDURE — 36415 COLL VENOUS BLD VENIPUNCTURE: CPT

## 2021-10-18 PROCEDURE — 84439 ASSAY OF FREE THYROXINE: CPT

## 2021-10-21 RX ORDER — LEVOTHYROXINE SODIUM 112 UG/1
112 TABLET ORAL
Qty: 90 TABLET | Refills: 3 | Status: SHIPPED
Start: 2021-10-21 | End: 2021-10-28 | Stop reason: SDUPTHER

## 2021-10-21 RX ORDER — LEVOTHYROXINE SODIUM 112 UG/1
112 TABLET ORAL
Qty: 90 TABLET | Refills: 3 | Status: SHIPPED | OUTPATIENT
Start: 2021-10-21 | End: 2021-10-21

## 2021-10-21 NOTE — PROGRESS NOTES
Assessment:  -Diminished TSH 8.090, free T4 elevated 1.99.  -Currently under treatment with levothyroxine 125 mcg daily 6 days/week.    -I have contacted the patient with the recommendation of 88 mcg of levothyroxine daily.  For some reason she feels more comfortable with 112 mcg 6 days a week.    Per patient request, I have printed the prescription and emailed a copy to her so that she may obtain her medicine at a [place] pharmacy.    Anastacio Lyn PA-C 10/21/2021

## 2021-10-27 RX ORDER — DULOXETIN HYDROCHLORIDE 20 MG/1
20 CAPSULE, DELAYED RELEASE ORAL DAILY
Qty: 30 CAPSULE | Refills: 0 | Status: SHIPPED | OUTPATIENT
Start: 2021-10-27 | End: 2021-11-11

## 2021-10-28 RX ORDER — LEVOTHYROXINE SODIUM 112 UG/1
112 TABLET ORAL
Qty: 90 TABLET | Refills: 3 | Status: SHIPPED
Start: 2021-10-28 | End: 2022-11-10 | Stop reason: DRUGHIGH

## 2021-11-01 ENCOUNTER — APPOINTMENT (RX ONLY)
Dept: URBAN - METROPOLITAN AREA CLINIC 4 | Facility: CLINIC | Age: 61
Setting detail: DERMATOLOGY
End: 2021-11-01

## 2021-11-01 DIAGNOSIS — L80 VITILIGO: ICD-10-CM | Status: INADEQUATELY CONTROLLED

## 2021-11-01 DIAGNOSIS — R20.2 PARESTHESIA OF SKIN: ICD-10-CM

## 2021-11-01 PROCEDURE — ? PRESCRIPTION

## 2021-11-01 PROCEDURE — 99214 OFFICE O/P EST MOD 30 MIN: CPT

## 2021-11-01 PROCEDURE — ? COUNSELING

## 2021-11-01 PROCEDURE — ? MEDICAL CONSULTATION: EXCIMER LASER

## 2021-11-01 PROCEDURE — ? ADDITIONAL NOTES

## 2021-11-01 RX ORDER — TRIAMCINOLONE ACETONIDE 1 MG/G
1 CREAM TOPICAL BID
Qty: 30 | Refills: 3 | Status: ERX | COMMUNITY
Start: 2021-11-01

## 2021-11-01 RX ADMIN — TRIAMCINOLONE ACETONIDE 1: 1 CREAM TOPICAL at 00:00

## 2021-11-01 ASSESSMENT — LOCATION ZONE DERM
LOCATION ZONE: ARM
LOCATION ZONE: TRUNK
LOCATION ZONE: FINGER

## 2021-11-01 ASSESSMENT — LOCATION DETAILED DESCRIPTION DERM
LOCATION DETAILED: RIGHT PROXIMAL DORSAL MIDDLE FINGER
LOCATION DETAILED: RIGHT ELBOW
LOCATION DETAILED: LEFT ELBOW
LOCATION DETAILED: SUPERIOR THORACIC SPINE

## 2021-11-22 ENCOUNTER — PATIENT MESSAGE (OUTPATIENT)
Dept: MEDICAL GROUP | Facility: PHYSICIAN GROUP | Age: 61
End: 2021-11-22

## 2021-11-22 DIAGNOSIS — F34.1 DYSTHYMIA: ICD-10-CM

## 2021-11-29 RX ORDER — DULOXETIN HYDROCHLORIDE 30 MG/1
30 CAPSULE, DELAYED RELEASE ORAL DAILY
Qty: 90 CAPSULE | Refills: 0 | Status: SHIPPED | OUTPATIENT
Start: 2021-11-29 | End: 2022-04-05 | Stop reason: SDUPTHER

## 2021-12-13 ENCOUNTER — OFFICE VISIT (OUTPATIENT)
Dept: MEDICAL GROUP | Facility: PHYSICIAN GROUP | Age: 61
End: 2021-12-13
Payer: COMMERCIAL

## 2021-12-13 VITALS
OXYGEN SATURATION: 96 % | BODY MASS INDEX: 26.47 KG/M2 | SYSTOLIC BLOOD PRESSURE: 115 MMHG | DIASTOLIC BLOOD PRESSURE: 60 MMHG | TEMPERATURE: 98.2 F | WEIGHT: 140.2 LBS | HEIGHT: 61 IN | HEART RATE: 92 BPM | RESPIRATION RATE: 16 BRPM

## 2021-12-13 DIAGNOSIS — R09.A2 GLOBUS SENSATION: ICD-10-CM

## 2021-12-13 DIAGNOSIS — R07.89 CHEST PRESSURE: ICD-10-CM

## 2021-12-13 PROCEDURE — 99212 OFFICE O/P EST SF 10 MIN: CPT | Performed by: STUDENT IN AN ORGANIZED HEALTH CARE EDUCATION/TRAINING PROGRAM

## 2021-12-13 ASSESSMENT — FIBROSIS 4 INDEX: FIB4 SCORE: 0.76

## 2021-12-13 NOTE — PROGRESS NOTES
Subjective:     CC:  Diagnoses of Globus sensation and Chest pressure were pertinent to this visit.    HISTORY OF THE PRESENT ILLNESS: Patient is a 61 y.o. female.     1.  Globus sensation  Onset 3 weeks ago located about the level of the glottis.  The sensation comes and goes with eating.  Drinking does not seem to affect this.  She notices this sensation of fullness and difficulty of swallowing with nuts and other from bolus.  She does not report chronic cough or chronic sore throat.  She indicates that she sleeps both on her left and right side with 1 or more pillows and the head of her bed elevated.  She does not report any sensation of heartburn or reflux.      2.  Chest pressure with jaw pain  Ongoing for 7 to 8 years.  3-4 times in the past 6 months.  She experiences central chest pressure and then bilateral burning type jaw pain.  This lasts a couple of minutes.  She finds that this is more common during times of high stress and anxiety.  She denies any shortness of breath associated with these episodes.  She denies any radiation to the arms or diaphoresis.  She indicates that she wants to make sure that this is not her heart.      Active Diagnosis:    Patient Active Problem List   Diagnosis   • History of thyroid cancer   • Postoperative hypothyroidism   • Generalized anxiety disorder   • Dysthymia   • Globus sensation   • Chest pressure          Current Outpatient Medications Ordered in Epic   Medication Sig Dispense Refill   • DULoxetine (CYMBALTA) 30 MG Cap DR Particles Take 1 Capsule by mouth every day. 90 Capsule 0   • levothyroxine (SYNTHROID) 112 MCG Tab Take 1 Tablet by mouth every morning on an empty stomach. 90 Tablet 3   • diphenhydrAMINE (BENADRYL) 25 MG TABS Take 25 mg by mouth every evening.     • loratadine (CLARITIN) 10 MG TABS Take 10 mg by mouth every day.       No current [facility]-ordered facility-administered medications on file.     ROS:   Gen: No fevers/chills, no changes in weight  HEENT:  "See HPI.  Pulm: No cough, unexplained SOB.  CV: See HPI.      Objective:     Exam: /60 (BP Location: Left arm, Patient Position: Sitting, BP Cuff Size: Adult)   Pulse 92   Temp 36.8 °C (98.2 °F) (Temporal)   Resp 16   Ht 1.549 m (5' 1\")   Wt 63.6 kg (140 lb 3.2 oz)   SpO2 96%  Body mass index is 26.49 kg/m².    General: Normal appearing. No distress.  HEENT: Oropharynx is without erythema, edema or exudates.   Neck: Supple without JVD. Thyroid is not enlarged.  Cardiovascular: Regular rate and rhythm without murmur. Radial pulses are intact and equal bilaterally.  Neurologic: Grossly nonfocal.  CN II through XII intact.  Lymph: No cervical or supraclavicular lymph nodes are palpable  Skin: Warm and dry.  No obvious lesions.    A chaperone was offered to the patient during today's exam. Patient declined chaperone.    Labs:   12/13/2021:  -EKG, sinus rhythm with no evidence of current or previous pathology.    2/13/2021:  CBC, WNL  -CMP shows elevated fasting glucose of 106 and elevated alkaline phosphatase of 105.      10/18/2021:  -TSH 0.090, free T4 1.99    Assessment & Plan:   61 y.o. female with the following -    1.  Globus sensation.  -Undiagnosed new problem with uncertain prognosis  -DDx: Esophageal pouch versus esophageal stricture versus GERD  -Refer to gastroenterology for evaluation and treatment as indicated.    2. Chest pressure with jaw pain  -Chronic, recurring.  -Patient does not want to pursue this any further today.  -EKG in office today.    Return if symptoms worsen or fail to improve.    Please note that this dictation was created using voice recognition software. I have made every reasonable attempt to correct obvious errors, but I expect that there are errors of grammar and possibly content that I did not discover before finalizing the note.      Anastacio Lyn PA-C 12/13/2021  "

## 2022-01-12 ENCOUNTER — APPOINTMENT (RX ONLY)
Dept: URBAN - METROPOLITAN AREA CLINIC 4 | Facility: CLINIC | Age: 62
Setting detail: DERMATOLOGY
End: 2022-01-12

## 2022-01-12 DIAGNOSIS — L80 VITILIGO: ICD-10-CM

## 2022-01-12 PROCEDURE — ? EXCIMER LASER

## 2022-01-12 PROCEDURE — 96920 EXCIMER LSR PSRIASIS<250SQCM: CPT

## 2022-01-12 ASSESSMENT — LOCATION DETAILED DESCRIPTION DERM
LOCATION DETAILED: RIGHT AXILLARY VAULT
LOCATION DETAILED: RIGHT PROXIMAL DORSAL INDEX FINGER
LOCATION DETAILED: RIGHT ELBOW
LOCATION DETAILED: LEFT ELBOW
LOCATION DETAILED: LEFT AXILLARY VAULT

## 2022-01-12 ASSESSMENT — LOCATION SIMPLE DESCRIPTION DERM
LOCATION SIMPLE: RIGHT INDEX FINGER
LOCATION SIMPLE: LEFT ELBOW
LOCATION SIMPLE: RIGHT AXILLARY VAULT
LOCATION SIMPLE: RIGHT ELBOW
LOCATION SIMPLE: LEFT AXILLARY VAULT

## 2022-01-12 ASSESSMENT — LOCATION ZONE DERM
LOCATION ZONE: ARM
LOCATION ZONE: FINGER
LOCATION ZONE: AXILLAE

## 2022-01-12 NOTE — PROCEDURE: EXCIMER LASER
Location #2: R middle finger, B elbows
Detail Level: Detailed
Billing: [id number] (Total area less than 250 cm2)
Treatment Number: 1
Total Square Area In Cm2 (Optional): 120
Spot Size: 2 x 2 cm
Fluence Units: J/cm2
Location #1: armpits
Post-Care Instructions: I reviewed with the patient in detail post-care instructions. Patient should stay away from the sun and wear sun protection until treated areas are fully healed.
Fluence #1 (J/Cm2 Or Mj/Cm2): 150
Fluence #2 (J/Cm2 Or Mj/Cm2): 350
Mode: repeat paint
Consent: Written consent obtained, risks reviewed including but not limited to crusting, scabbing, blistering, scarring, darker or lighter pigmentary change, incidental hair removal, bruising, and/or incomplete removal.

## 2022-01-14 ENCOUNTER — APPOINTMENT (RX ONLY)
Dept: URBAN - METROPOLITAN AREA CLINIC 4 | Facility: CLINIC | Age: 62
Setting detail: DERMATOLOGY
End: 2022-01-14

## 2022-01-14 DIAGNOSIS — L80 VITILIGO: ICD-10-CM

## 2022-01-14 PROCEDURE — 96920 EXCIMER LSR PSRIASIS<250SQCM: CPT

## 2022-01-14 PROCEDURE — ? EXCIMER LASER

## 2022-01-14 ASSESSMENT — LOCATION SIMPLE DESCRIPTION DERM
LOCATION SIMPLE: RIGHT ELBOW
LOCATION SIMPLE: RIGHT AXILLARY VAULT
LOCATION SIMPLE: RIGHT INDEX FINGER
LOCATION SIMPLE: LEFT AXILLARY VAULT
LOCATION SIMPLE: LEFT ELBOW

## 2022-01-14 ASSESSMENT — LOCATION DETAILED DESCRIPTION DERM
LOCATION DETAILED: LEFT ELBOW
LOCATION DETAILED: RIGHT ELBOW
LOCATION DETAILED: LEFT AXILLARY VAULT
LOCATION DETAILED: RIGHT PROXIMAL DORSAL INDEX FINGER
LOCATION DETAILED: RIGHT AXILLARY VAULT

## 2022-01-14 ASSESSMENT — LOCATION ZONE DERM
LOCATION ZONE: FINGER
LOCATION ZONE: ARM
LOCATION ZONE: AXILLAE

## 2022-01-14 NOTE — PROCEDURE: EXCIMER LASER
Location #2: R middle finger, B elbows
Detail Level: Detailed
Billing: [id number] (Total area less than 250 cm2)
Treatment Number: 2
Total Square Area In Cm2 (Optional): 148
Spot Size: 2 x 2 cm
Fluence Units: J/cm2
Location #1: armpits
Post-Care Instructions: I reviewed with the patient in detail post-care instructions. Patient should stay away from the sun and wear sun protection until treated areas are fully healed.
Fluence #1 (J/Cm2 Or Mj/Cm2): 200
Fluence #2 (J/Cm2 Or Mj/Cm2): 400
Mode: repeat paint
Consent: Written consent obtained, risks reviewed including but not limited to crusting, scabbing, blistering, scarring, darker or lighter pigmentary change, incidental hair removal, bruising, and/or incomplete removal.

## 2022-01-17 ENCOUNTER — APPOINTMENT (RX ONLY)
Dept: URBAN - METROPOLITAN AREA CLINIC 4 | Facility: CLINIC | Age: 62
Setting detail: DERMATOLOGY
End: 2022-01-17

## 2022-01-17 DIAGNOSIS — L80 VITILIGO: ICD-10-CM

## 2022-01-17 PROCEDURE — 96920 EXCIMER LSR PSRIASIS<250SQCM: CPT

## 2022-01-17 PROCEDURE — ? EXCIMER LASER

## 2022-01-17 ASSESSMENT — LOCATION DETAILED DESCRIPTION DERM
LOCATION DETAILED: RIGHT PROXIMAL DORSAL INDEX FINGER
LOCATION DETAILED: RIGHT AXILLARY VAULT
LOCATION DETAILED: LEFT AXILLARY VAULT
LOCATION DETAILED: RIGHT ELBOW
LOCATION DETAILED: LEFT ELBOW

## 2022-01-17 ASSESSMENT — LOCATION SIMPLE DESCRIPTION DERM
LOCATION SIMPLE: LEFT AXILLARY VAULT
LOCATION SIMPLE: RIGHT ELBOW
LOCATION SIMPLE: RIGHT INDEX FINGER
LOCATION SIMPLE: LEFT ELBOW
LOCATION SIMPLE: RIGHT AXILLARY VAULT

## 2022-01-17 ASSESSMENT — LOCATION ZONE DERM
LOCATION ZONE: FINGER
LOCATION ZONE: AXILLAE
LOCATION ZONE: ARM

## 2022-01-17 NOTE — PROCEDURE: EXCIMER LASER
Location #2: R middle finger, B elbows
Detail Level: Detailed
Billing: [id number] (Total area less than 250 cm2)
Treatment Number: 3
Total Square Area In Cm2 (Optional): 144
Spot Size: 2 x 2 cm
Fluence Units: J/cm2
Location #1: armpits
Post-Care Instructions: I reviewed with the patient in detail post-care instructions. Patient should stay away from the sun and wear sun protection until treated areas are fully healed.
Fluence #1 (J/Cm2 Or Mj/Cm2): 250
Fluence #2 (J/Cm2 Or Mj/Cm2): 450
Mode: repeat paint
Consent: Written consent obtained, risks reviewed including but not limited to crusting, scabbing, blistering, scarring, darker or lighter pigmentary change, incidental hair removal, bruising, and/or incomplete removal.

## 2022-01-19 ENCOUNTER — APPOINTMENT (RX ONLY)
Dept: URBAN - METROPOLITAN AREA CLINIC 4 | Facility: CLINIC | Age: 62
Setting detail: DERMATOLOGY
End: 2022-01-19

## 2022-01-19 DIAGNOSIS — L80 VITILIGO: ICD-10-CM

## 2022-01-19 PROCEDURE — ? EXCIMER LASER

## 2022-01-19 PROCEDURE — 96920 EXCIMER LSR PSRIASIS<250SQCM: CPT

## 2022-01-19 ASSESSMENT — LOCATION SIMPLE DESCRIPTION DERM
LOCATION SIMPLE: LEFT ELBOW
LOCATION SIMPLE: RIGHT AXILLARY VAULT
LOCATION SIMPLE: RIGHT ELBOW
LOCATION SIMPLE: LEFT AXILLARY VAULT
LOCATION SIMPLE: RIGHT INDEX FINGER

## 2022-01-19 ASSESSMENT — LOCATION DETAILED DESCRIPTION DERM
LOCATION DETAILED: RIGHT PROXIMAL DORSAL INDEX FINGER
LOCATION DETAILED: LEFT ELBOW
LOCATION DETAILED: LEFT AXILLARY VAULT
LOCATION DETAILED: RIGHT AXILLARY VAULT
LOCATION DETAILED: RIGHT ELBOW

## 2022-01-19 ASSESSMENT — LOCATION ZONE DERM
LOCATION ZONE: FINGER
LOCATION ZONE: ARM
LOCATION ZONE: AXILLAE

## 2022-01-19 NOTE — PROCEDURE: EXCIMER LASER
Location #2: R middle finger, B elbows
Detail Level: Detailed
Billing: [id number] (Total area less than 250 cm2)
Treatment Number: 4
Total Square Area In Cm2 (Optional): 196
Spot Size: 2 x 2 cm
Fluence Units: J/cm2
Location #1: armpits
Post-Care Instructions: I reviewed with the patient in detail post-care instructions. Patient should stay away from the sun and wear sun protection until treated areas are fully healed.
Fluence #1 (J/Cm2 Or Mj/Cm2): 300
Fluence #2 (J/Cm2 Or Mj/Cm2): 500
Mode: repeat paint
Consent: Written consent obtained, risks reviewed including but not limited to crusting, scabbing, blistering, scarring, darker or lighter pigmentary change, incidental hair removal, bruising, and/or incomplete removal.

## 2022-01-24 ENCOUNTER — APPOINTMENT (RX ONLY)
Dept: URBAN - METROPOLITAN AREA CLINIC 4 | Facility: CLINIC | Age: 62
Setting detail: DERMATOLOGY
End: 2022-01-24

## 2022-01-24 DIAGNOSIS — L80 VITILIGO: ICD-10-CM

## 2022-01-24 PROCEDURE — ? EXCIMER LASER

## 2022-01-24 PROCEDURE — 96920 EXCIMER LSR PSRIASIS<250SQCM: CPT

## 2022-01-24 ASSESSMENT — LOCATION ZONE DERM
LOCATION ZONE: FINGER
LOCATION ZONE: ARM
LOCATION ZONE: AXILLAE

## 2022-01-24 ASSESSMENT — LOCATION DETAILED DESCRIPTION DERM
LOCATION DETAILED: RIGHT ELBOW
LOCATION DETAILED: LEFT ELBOW
LOCATION DETAILED: RIGHT PROXIMAL DORSAL INDEX FINGER
LOCATION DETAILED: RIGHT AXILLARY VAULT
LOCATION DETAILED: LEFT AXILLARY VAULT

## 2022-01-24 ASSESSMENT — LOCATION SIMPLE DESCRIPTION DERM
LOCATION SIMPLE: LEFT ELBOW
LOCATION SIMPLE: RIGHT INDEX FINGER
LOCATION SIMPLE: RIGHT AXILLARY VAULT
LOCATION SIMPLE: RIGHT ELBOW
LOCATION SIMPLE: LEFT AXILLARY VAULT

## 2022-01-24 NOTE — PROCEDURE: EXCIMER LASER
Location #2: R middle finger, B elbows
Detail Level: Detailed
Billing: [id number] (Total area less than 250 cm2)
Treatment Number: 5
Total Square Area In Cm2 (Optional): 176
Spot Size: 2 x 2 cm
Fluence Units: J/cm2
Location #1: armpits
Post-Care Instructions: I reviewed with the patient in detail post-care instructions. Patient should stay away from the sun and wear sun protection until treated areas are fully healed.
Fluence #1 (J/Cm2 Or Mj/Cm2): 250
Fluence #2 (J/Cm2 Or Mj/Cm2): 550
Mode: repeat paint
Consent: Written consent obtained, risks reviewed including but not limited to crusting, scabbing, blistering, scarring, darker or lighter pigmentary change, incidental hair removal, bruising, and/or incomplete removal.

## 2022-01-28 ENCOUNTER — APPOINTMENT (RX ONLY)
Dept: URBAN - METROPOLITAN AREA CLINIC 4 | Facility: CLINIC | Age: 62
Setting detail: DERMATOLOGY
End: 2022-01-28

## 2022-01-28 DIAGNOSIS — L80 VITILIGO: ICD-10-CM

## 2022-01-28 PROCEDURE — 96920 EXCIMER LSR PSRIASIS<250SQCM: CPT

## 2022-01-28 PROCEDURE — ? EXCIMER LASER

## 2022-01-28 ASSESSMENT — LOCATION ZONE DERM
LOCATION ZONE: ARM
LOCATION ZONE: AXILLAE
LOCATION ZONE: FINGER

## 2022-01-28 ASSESSMENT — LOCATION SIMPLE DESCRIPTION DERM
LOCATION SIMPLE: LEFT ELBOW
LOCATION SIMPLE: RIGHT AXILLARY VAULT
LOCATION SIMPLE: LEFT AXILLARY VAULT
LOCATION SIMPLE: RIGHT ELBOW
LOCATION SIMPLE: RIGHT INDEX FINGER

## 2022-01-28 ASSESSMENT — LOCATION DETAILED DESCRIPTION DERM
LOCATION DETAILED: LEFT AXILLARY VAULT
LOCATION DETAILED: RIGHT AXILLARY VAULT
LOCATION DETAILED: LEFT ELBOW
LOCATION DETAILED: RIGHT ELBOW
LOCATION DETAILED: RIGHT PROXIMAL DORSAL INDEX FINGER

## 2022-01-28 NOTE — PROCEDURE: EXCIMER LASER
Location #2: R middle finger, B elbows
Detail Level: Detailed
Billing: [id number] (Total area less than 250 cm2)
Treatment Number: 6
Total Square Area In Cm2 (Optional): 220
Spot Size: 2 x 2 cm
Fluence Units: J/cm2
Location #1: armpits
Post-Care Instructions: I reviewed with the patient in detail post-care instructions. Patient should stay away from the sun and wear sun protection until treated areas are fully healed.
Fluence #1 (J/Cm2 Or Mj/Cm2): 250
Fluence #2 (J/Cm2 Or Mj/Cm2): 600
Mode: repeat paint
Consent: Written consent obtained, risks reviewed including but not limited to crusting, scabbing, blistering, scarring, darker or lighter pigmentary change, incidental hair removal, bruising, and/or incomplete removal.

## 2022-01-31 ENCOUNTER — APPOINTMENT (RX ONLY)
Dept: URBAN - METROPOLITAN AREA CLINIC 4 | Facility: CLINIC | Age: 62
Setting detail: DERMATOLOGY
End: 2022-01-31

## 2022-01-31 DIAGNOSIS — L80 VITILIGO: ICD-10-CM

## 2022-01-31 PROCEDURE — ? EXCIMER LASER

## 2022-01-31 PROCEDURE — 96920 EXCIMER LSR PSRIASIS<250SQCM: CPT

## 2022-01-31 ASSESSMENT — LOCATION ZONE DERM
LOCATION ZONE: ARM
LOCATION ZONE: AXILLAE
LOCATION ZONE: FINGER

## 2022-01-31 ASSESSMENT — LOCATION DETAILED DESCRIPTION DERM
LOCATION DETAILED: RIGHT AXILLARY VAULT
LOCATION DETAILED: RIGHT ELBOW
LOCATION DETAILED: RIGHT PROXIMAL DORSAL INDEX FINGER
LOCATION DETAILED: LEFT ELBOW
LOCATION DETAILED: LEFT AXILLARY VAULT

## 2022-01-31 ASSESSMENT — LOCATION SIMPLE DESCRIPTION DERM
LOCATION SIMPLE: RIGHT INDEX FINGER
LOCATION SIMPLE: LEFT AXILLARY VAULT
LOCATION SIMPLE: LEFT ELBOW
LOCATION SIMPLE: RIGHT ELBOW
LOCATION SIMPLE: RIGHT AXILLARY VAULT

## 2022-01-31 NOTE — PROCEDURE: EXCIMER LASER
Location #2: R middle finger, B elbows
Detail Level: Detailed
Billing: [id number] (Total area less than 250 cm2)
Treatment Number: 7
Total Square Area In Cm2 (Optional): 176
Spot Size: 2 x 2 cm
Fluence Units: J/cm2
Location #1: armpits
Post-Care Instructions: I reviewed with the patient in detail post-care instructions. Patient should stay away from the sun and wear sun protection until treated areas are fully healed.
Fluence #1 (J/Cm2 Or Mj/Cm2): 300
Fluence #2 (J/Cm2 Or Mj/Cm2): 650
Mode: repeat paint
Consent: Written consent obtained, risks reviewed including but not limited to crusting, scabbing, blistering, scarring, darker or lighter pigmentary change, incidental hair removal, bruising, and/or incomplete removal.

## 2022-02-03 ENCOUNTER — APPOINTMENT (RX ONLY)
Dept: URBAN - METROPOLITAN AREA CLINIC 4 | Facility: CLINIC | Age: 62
Setting detail: DERMATOLOGY
End: 2022-02-03

## 2022-02-03 DIAGNOSIS — L80 VITILIGO: ICD-10-CM

## 2022-02-03 PROCEDURE — 96920 EXCIMER LSR PSRIASIS<250SQCM: CPT

## 2022-02-03 PROCEDURE — ? EXCIMER LASER

## 2022-02-03 ASSESSMENT — LOCATION SIMPLE DESCRIPTION DERM
LOCATION SIMPLE: RIGHT INDEX FINGER
LOCATION SIMPLE: RIGHT AXILLARY VAULT
LOCATION SIMPLE: LEFT AXILLARY VAULT
LOCATION SIMPLE: LEFT ELBOW
LOCATION SIMPLE: RIGHT ELBOW

## 2022-02-03 ASSESSMENT — LOCATION ZONE DERM
LOCATION ZONE: AXILLAE
LOCATION ZONE: ARM
LOCATION ZONE: FINGER

## 2022-02-03 ASSESSMENT — LOCATION DETAILED DESCRIPTION DERM
LOCATION DETAILED: LEFT AXILLARY VAULT
LOCATION DETAILED: LEFT ELBOW
LOCATION DETAILED: RIGHT PROXIMAL DORSAL INDEX FINGER
LOCATION DETAILED: RIGHT AXILLARY VAULT
LOCATION DETAILED: RIGHT ELBOW

## 2022-02-03 NOTE — PROCEDURE: EXCIMER LASER
Location #2: R middle finger, B elbows
Detail Level: Detailed
Billing: [id number] (Total area less than 250 cm2)
Treatment Number: 9
Total Square Area In Cm2 (Optional): 176
Spot Size: 2 x 2 cm
Fluence Units: J/cm2
Location #1: armpits
Post-Care Instructions: I reviewed with the patient in detail post-care instructions. Patient should stay away from the sun and wear sun protection until treated areas are fully healed.
Fluence #1 (J/Cm2 Or Mj/Cm2): 300
Fluence #2 (J/Cm2 Or Mj/Cm2): 650
Mode: repeat paint
Consent: Written consent obtained, risks reviewed including but not limited to crusting, scabbing, blistering, scarring, darker or lighter pigmentary change, incidental hair removal, bruising, and/or incomplete removal.

## 2022-02-07 ENCOUNTER — APPOINTMENT (RX ONLY)
Dept: URBAN - METROPOLITAN AREA CLINIC 4 | Facility: CLINIC | Age: 62
Setting detail: DERMATOLOGY
End: 2022-02-07

## 2022-02-07 DIAGNOSIS — L80 VITILIGO: ICD-10-CM

## 2022-02-07 PROCEDURE — 96920 EXCIMER LSR PSRIASIS<250SQCM: CPT

## 2022-02-07 PROCEDURE — ? EXCIMER LASER

## 2022-02-07 ASSESSMENT — LOCATION SIMPLE DESCRIPTION DERM
LOCATION SIMPLE: RIGHT AXILLARY VAULT
LOCATION SIMPLE: LEFT AXILLARY VAULT
LOCATION SIMPLE: RIGHT INDEX FINGER
LOCATION SIMPLE: RIGHT ELBOW
LOCATION SIMPLE: LEFT ELBOW

## 2022-02-07 ASSESSMENT — LOCATION ZONE DERM
LOCATION ZONE: AXILLAE
LOCATION ZONE: FINGER
LOCATION ZONE: ARM

## 2022-02-07 ASSESSMENT — LOCATION DETAILED DESCRIPTION DERM
LOCATION DETAILED: RIGHT ELBOW
LOCATION DETAILED: LEFT AXILLARY VAULT
LOCATION DETAILED: LEFT ELBOW
LOCATION DETAILED: RIGHT PROXIMAL DORSAL INDEX FINGER
LOCATION DETAILED: RIGHT AXILLARY VAULT

## 2022-02-07 NOTE — PROCEDURE: EXCIMER LASER
Location #2: R middle finger, B elbows
Detail Level: Detailed
Billing: [id number] (Total area less than 250 cm2)
Treatment Number: 7
Total Square Area In Cm2 (Optional): 172
Spot Size: 2 x 2 cm
Fluence Units: J/cm2
Location #1: armpits
Post-Care Instructions: I reviewed with the patient in detail post-care instructions. Patient should stay away from the sun and wear sun protection until treated areas are fully healed.
Fluence #1 (J/Cm2 Or Mj/Cm2): 400
Fluence #2 (J/Cm2 Or Mj/Cm2): 750
Mode: repeat paint
Consent: Written consent obtained, risks reviewed including but not limited to crusting, scabbing, blistering, scarring, darker or lighter pigmentary change, incidental hair removal, bruising, and/or incomplete removal.

## 2022-02-11 ENCOUNTER — APPOINTMENT (RX ONLY)
Dept: URBAN - METROPOLITAN AREA CLINIC 4 | Facility: CLINIC | Age: 62
Setting detail: DERMATOLOGY
End: 2022-02-11

## 2022-02-11 DIAGNOSIS — L80 VITILIGO: ICD-10-CM

## 2022-02-11 PROCEDURE — ? EXCIMER LASER

## 2022-02-11 PROCEDURE — 96920 EXCIMER LSR PSRIASIS<250SQCM: CPT

## 2022-02-11 ASSESSMENT — LOCATION SIMPLE DESCRIPTION DERM
LOCATION SIMPLE: RIGHT AXILLARY VAULT
LOCATION SIMPLE: LEFT ELBOW
LOCATION SIMPLE: RIGHT ELBOW
LOCATION SIMPLE: RIGHT INDEX FINGER
LOCATION SIMPLE: LEFT AXILLARY VAULT

## 2022-02-11 ASSESSMENT — LOCATION ZONE DERM
LOCATION ZONE: FINGER
LOCATION ZONE: ARM
LOCATION ZONE: AXILLAE

## 2022-02-11 ASSESSMENT — LOCATION DETAILED DESCRIPTION DERM
LOCATION DETAILED: RIGHT PROXIMAL DORSAL INDEX FINGER
LOCATION DETAILED: RIGHT AXILLARY VAULT
LOCATION DETAILED: RIGHT ELBOW
LOCATION DETAILED: LEFT ELBOW
LOCATION DETAILED: LEFT AXILLARY VAULT

## 2022-02-11 NOTE — PROCEDURE: EXCIMER LASER
Location #2: R middle finger, B elbows
Detail Level: Detailed
Billing: [id number] (Total area less than 250 cm2)
Treatment Number: 8
Total Square Area In Cm2 (Optional): 196
Spot Size: 2 x 2 cm
Fluence Units: J/cm2
Location #1: armpits
Post-Care Instructions: I reviewed with the patient in detail post-care instructions. Patient should stay away from the sun and wear sun protection until treated areas are fully healed.
Fluence #1 (J/Cm2 Or Mj/Cm2): 400
Fluence #2 (J/Cm2 Or Mj/Cm2): 800
Mode: repeat paint
Consent: Written consent obtained, risks reviewed including but not limited to crusting, scabbing, blistering, scarring, darker or lighter pigmentary change, incidental hair removal, bruising, and/or incomplete removal.

## 2022-02-14 ENCOUNTER — APPOINTMENT (RX ONLY)
Dept: URBAN - METROPOLITAN AREA CLINIC 4 | Facility: CLINIC | Age: 62
Setting detail: DERMATOLOGY
End: 2022-02-14

## 2022-02-14 DIAGNOSIS — L80 VITILIGO: ICD-10-CM

## 2022-02-14 PROCEDURE — ? EXCIMER LASER

## 2022-02-14 PROCEDURE — 96920 EXCIMER LSR PSRIASIS<250SQCM: CPT

## 2022-02-14 ASSESSMENT — LOCATION DETAILED DESCRIPTION DERM
LOCATION DETAILED: RIGHT ELBOW
LOCATION DETAILED: LEFT ELBOW
LOCATION DETAILED: RIGHT AXILLARY VAULT
LOCATION DETAILED: RIGHT PROXIMAL DORSAL INDEX FINGER
LOCATION DETAILED: LEFT AXILLARY VAULT

## 2022-02-14 ASSESSMENT — LOCATION ZONE DERM
LOCATION ZONE: ARM
LOCATION ZONE: AXILLAE
LOCATION ZONE: FINGER

## 2022-02-14 ASSESSMENT — LOCATION SIMPLE DESCRIPTION DERM
LOCATION SIMPLE: RIGHT AXILLARY VAULT
LOCATION SIMPLE: LEFT ELBOW
LOCATION SIMPLE: RIGHT INDEX FINGER
LOCATION SIMPLE: RIGHT ELBOW
LOCATION SIMPLE: LEFT AXILLARY VAULT

## 2022-02-14 NOTE — PROCEDURE: EXCIMER LASER
Location #2: R middle finger, B elbows
Detail Level: Detailed
Billing: [id number] (Total area less than 250 cm2)
Treatment Number: 9
Total Square Area In Cm2 (Optional): 192
Spot Size: 2 x 2 cm
Fluence Units: J/cm2
Location #1: armpits
Post-Care Instructions: I reviewed with the patient in detail post-care instructions. Patient should stay away from the sun and wear sun protection until treated areas are fully healed.
Fluence #1 (J/Cm2 Or Mj/Cm2): 400
Fluence #2 (J/Cm2 Or Mj/Cm2): 800
Mode: repeat paint
Consent: Written consent obtained, risks reviewed including but not limited to crusting, scabbing, blistering, scarring, darker or lighter pigmentary change, incidental hair removal, bruising, and/or incomplete removal.

## 2022-02-16 ENCOUNTER — APPOINTMENT (RX ONLY)
Dept: URBAN - METROPOLITAN AREA CLINIC 4 | Facility: CLINIC | Age: 62
Setting detail: DERMATOLOGY
End: 2022-02-16

## 2022-02-16 DIAGNOSIS — L80 VITILIGO: ICD-10-CM

## 2022-02-16 PROCEDURE — 96920 EXCIMER LSR PSRIASIS<250SQCM: CPT

## 2022-02-16 PROCEDURE — ? EXCIMER LASER

## 2022-02-16 ASSESSMENT — LOCATION ZONE DERM
LOCATION ZONE: ARM
LOCATION ZONE: FINGER
LOCATION ZONE: AXILLAE

## 2022-02-16 ASSESSMENT — LOCATION SIMPLE DESCRIPTION DERM
LOCATION SIMPLE: LEFT AXILLARY VAULT
LOCATION SIMPLE: RIGHT INDEX FINGER
LOCATION SIMPLE: RIGHT AXILLARY VAULT
LOCATION SIMPLE: LEFT ELBOW
LOCATION SIMPLE: RIGHT ELBOW

## 2022-02-16 ASSESSMENT — LOCATION DETAILED DESCRIPTION DERM
LOCATION DETAILED: RIGHT AXILLARY VAULT
LOCATION DETAILED: LEFT ELBOW
LOCATION DETAILED: RIGHT ELBOW
LOCATION DETAILED: LEFT AXILLARY VAULT
LOCATION DETAILED: RIGHT PROXIMAL DORSAL INDEX FINGER

## 2022-02-16 NOTE — PROCEDURE: EXCIMER LASER
Location #2: R middle finger, B elbows
Detail Level: Detailed
Billing: [id number] (Total area less than 250 cm2)
Treatment Number: 13
Total Square Area In Cm2 (Optional): 172
Spot Size: 2 x 2 cm
Fluence Units: J/cm2
Location #1: armpits
Post-Care Instructions: I reviewed with the patient in detail post-care instructions. Patient should stay away from the sun and wear sun protection until treated areas are fully healed.
Fluence #1 (J/Cm2 Or Mj/Cm2): 450
Fluence #2 (J/Cm2 Or Mj/Cm2): 850
Mode: repeat paint
Consent: Written consent obtained, risks reviewed including but not limited to crusting, scabbing, blistering, scarring, darker or lighter pigmentary change, incidental hair removal, bruising, and/or incomplete removal.

## 2022-02-22 ENCOUNTER — APPOINTMENT (RX ONLY)
Dept: URBAN - METROPOLITAN AREA CLINIC 4 | Facility: CLINIC | Age: 62
Setting detail: DERMATOLOGY
End: 2022-02-22

## 2022-02-22 DIAGNOSIS — L80 VITILIGO: ICD-10-CM

## 2022-02-22 PROCEDURE — 96920 EXCIMER LSR PSRIASIS<250SQCM: CPT

## 2022-02-22 PROCEDURE — ? EXCIMER LASER

## 2022-02-22 ASSESSMENT — LOCATION DETAILED DESCRIPTION DERM
LOCATION DETAILED: LEFT AXILLARY VAULT
LOCATION DETAILED: RIGHT PROXIMAL DORSAL INDEX FINGER
LOCATION DETAILED: LEFT ELBOW
LOCATION DETAILED: RIGHT ELBOW
LOCATION DETAILED: RIGHT AXILLARY VAULT

## 2022-02-22 ASSESSMENT — LOCATION SIMPLE DESCRIPTION DERM
LOCATION SIMPLE: RIGHT AXILLARY VAULT
LOCATION SIMPLE: LEFT ELBOW
LOCATION SIMPLE: RIGHT INDEX FINGER
LOCATION SIMPLE: LEFT AXILLARY VAULT
LOCATION SIMPLE: RIGHT ELBOW

## 2022-02-22 ASSESSMENT — LOCATION ZONE DERM
LOCATION ZONE: AXILLAE
LOCATION ZONE: ARM
LOCATION ZONE: FINGER

## 2022-02-22 NOTE — PROCEDURE: EXCIMER LASER
Location #2: R middle finger, B elbows
Detail Level: Detailed
Billing: [id number] (Total area less than 250 cm2)
Treatment Number: 14
Total Square Area In Cm2 (Optional): 180
Spot Size: 2 x 2 cm
Fluence Units: J/cm2
Location #1: armpits
Post-Care Instructions: I reviewed with the patient in detail post-care instructions. Patient should stay away from the sun and wear sun protection until treated areas are fully healed.
Fluence #1 (J/Cm2 Or Mj/Cm2): 500
Fluence #2 (J/Cm2 Or Mj/Cm2): 900
Mode: repeat paint
Consent: Written consent obtained, risks reviewed including but not limited to crusting, scabbing, blistering, scarring, darker or lighter pigmentary change, incidental hair removal, bruising, and/or incomplete removal.

## 2022-02-25 ENCOUNTER — APPOINTMENT (RX ONLY)
Dept: URBAN - METROPOLITAN AREA CLINIC 4 | Facility: CLINIC | Age: 62
Setting detail: DERMATOLOGY
End: 2022-02-25

## 2022-02-25 DIAGNOSIS — L80 VITILIGO: ICD-10-CM

## 2022-02-25 PROCEDURE — ? EXCIMER LASER

## 2022-02-25 PROCEDURE — 96920 EXCIMER LSR PSRIASIS<250SQCM: CPT

## 2022-02-25 ASSESSMENT — LOCATION ZONE DERM
LOCATION ZONE: ARM
LOCATION ZONE: AXILLAE
LOCATION ZONE: FINGER

## 2022-02-25 ASSESSMENT — LOCATION SIMPLE DESCRIPTION DERM
LOCATION SIMPLE: LEFT ELBOW
LOCATION SIMPLE: RIGHT INDEX FINGER
LOCATION SIMPLE: RIGHT ELBOW
LOCATION SIMPLE: RIGHT AXILLARY VAULT
LOCATION SIMPLE: LEFT AXILLARY VAULT

## 2022-02-25 ASSESSMENT — LOCATION DETAILED DESCRIPTION DERM
LOCATION DETAILED: LEFT AXILLARY VAULT
LOCATION DETAILED: RIGHT AXILLARY VAULT
LOCATION DETAILED: RIGHT ELBOW
LOCATION DETAILED: LEFT ELBOW
LOCATION DETAILED: RIGHT PROXIMAL DORSAL INDEX FINGER

## 2022-02-25 NOTE — PROCEDURE: EXCIMER LASER
Location #2: R middle finger, B elbows
Detail Level: Detailed
Billing: [id number] (Total area less than 250 cm2)
Treatment Number: 15
Total Square Area In Cm2 (Optional): 184
Spot Size: 2 x 2 cm
Fluence Units: J/cm2
Location #1: armpits
Post-Care Instructions: I reviewed with the patient in detail post-care instructions. Patient should stay away from the sun and wear sun protection until treated areas are fully healed.
Fluence #1 (J/Cm2 Or Mj/Cm2): 500
Fluence #2 (J/Cm2 Or Mj/Cm2): 900
Mode: repeat paint
Consent: Written consent obtained, risks reviewed including but not limited to crusting, scabbing, blistering, scarring, darker or lighter pigmentary change, incidental hair removal, bruising, and/or incomplete removal.

## 2022-03-01 ENCOUNTER — APPOINTMENT (RX ONLY)
Dept: URBAN - METROPOLITAN AREA CLINIC 4 | Facility: CLINIC | Age: 62
Setting detail: DERMATOLOGY
End: 2022-03-01

## 2022-03-01 DIAGNOSIS — L80 VITILIGO: ICD-10-CM

## 2022-03-01 PROCEDURE — 96920 EXCIMER LSR PSRIASIS<250SQCM: CPT

## 2022-03-01 PROCEDURE — ? EXCIMER LASER

## 2022-03-01 ASSESSMENT — LOCATION DETAILED DESCRIPTION DERM
LOCATION DETAILED: RIGHT ELBOW
LOCATION DETAILED: RIGHT PROXIMAL DORSAL INDEX FINGER
LOCATION DETAILED: LEFT ELBOW
LOCATION DETAILED: RIGHT AXILLARY VAULT
LOCATION DETAILED: LEFT AXILLARY VAULT

## 2022-03-01 ASSESSMENT — LOCATION SIMPLE DESCRIPTION DERM
LOCATION SIMPLE: RIGHT AXILLARY VAULT
LOCATION SIMPLE: LEFT AXILLARY VAULT
LOCATION SIMPLE: LEFT ELBOW
LOCATION SIMPLE: RIGHT INDEX FINGER
LOCATION SIMPLE: RIGHT ELBOW

## 2022-03-01 ASSESSMENT — LOCATION ZONE DERM
LOCATION ZONE: FINGER
LOCATION ZONE: ARM
LOCATION ZONE: AXILLAE

## 2022-03-01 NOTE — PROCEDURE: EXCIMER LASER
Location #2: R middle finger, B elbows
Detail Level: Detailed
Billing: [id number] (Total area less than 250 cm2)
Treatment Number: 16
Total Square Area In Cm2 (Optional): 172
Spot Size: 2 x 2 cm
Fluence Units: J/cm2
Location #1: armpits
Post-Care Instructions: I reviewed with the patient in detail post-care instructions. Patient should stay away from the sun and wear sun protection until treated areas are fully healed.
Fluence #1 (J/Cm2 Or Mj/Cm2): 500
Fluence #2 (J/Cm2 Or Mj/Cm2): 950
Mode: repeat paint
Consent: Written consent obtained, risks reviewed including but not limited to crusting, scabbing, blistering, scarring, darker or lighter pigmentary change, incidental hair removal, bruising, and/or incomplete removal.

## 2022-03-08 ENCOUNTER — APPOINTMENT (RX ONLY)
Dept: URBAN - METROPOLITAN AREA CLINIC 4 | Facility: CLINIC | Age: 62
Setting detail: DERMATOLOGY
End: 2022-03-08

## 2022-03-08 DIAGNOSIS — L80 VITILIGO: ICD-10-CM

## 2022-03-08 PROCEDURE — 96920 EXCIMER LSR PSRIASIS<250SQCM: CPT

## 2022-03-08 PROCEDURE — ? EXCIMER LASER

## 2022-03-08 ASSESSMENT — LOCATION DETAILED DESCRIPTION DERM
LOCATION DETAILED: RIGHT AXILLARY VAULT
LOCATION DETAILED: LEFT AXILLARY VAULT
LOCATION DETAILED: RIGHT ELBOW
LOCATION DETAILED: RIGHT PROXIMAL DORSAL INDEX FINGER
LOCATION DETAILED: LEFT ELBOW

## 2022-03-08 ASSESSMENT — LOCATION ZONE DERM
LOCATION ZONE: ARM
LOCATION ZONE: AXILLAE
LOCATION ZONE: FINGER

## 2022-03-08 ASSESSMENT — LOCATION SIMPLE DESCRIPTION DERM
LOCATION SIMPLE: LEFT ELBOW
LOCATION SIMPLE: RIGHT AXILLARY VAULT
LOCATION SIMPLE: RIGHT INDEX FINGER
LOCATION SIMPLE: LEFT AXILLARY VAULT
LOCATION SIMPLE: RIGHT ELBOW

## 2022-03-08 NOTE — PROCEDURE: EXCIMER LASER
Location #2: R middle finger, B elbows
Detail Level: Detailed
Billing: [id number] (Total area less than 250 cm2)
Treatment Number: 17
Total Square Area In Cm2 (Optional): 164
Spot Size: 2 x 2 cm
Fluence Units: J/cm2
Location #1: armpits
Post-Care Instructions: I reviewed with the patient in detail post-care instructions. Patient should stay away from the sun and wear sun protection until treated areas are fully healed.
Fluence #1 (J/Cm2 Or Mj/Cm2): 550
Fluence #2 (J/Cm2 Or Mj/Cm2): 1000
Mode: repeat paint
Consent: Written consent obtained, risks reviewed including but not limited to crusting, scabbing, blistering, scarring, darker or lighter pigmentary change, incidental hair removal, bruising, and/or incomplete removal.

## 2022-03-15 ENCOUNTER — APPOINTMENT (RX ONLY)
Dept: URBAN - METROPOLITAN AREA CLINIC 4 | Facility: CLINIC | Age: 62
Setting detail: DERMATOLOGY
End: 2022-03-15

## 2022-03-15 DIAGNOSIS — L65.9 NONSCARRING HAIR LOSS, UNSPECIFIED: ICD-10-CM

## 2022-03-15 DIAGNOSIS — L80 VITILIGO: ICD-10-CM | Status: INADEQUATELY CONTROLLED

## 2022-03-15 PROCEDURE — ? COUNSELING

## 2022-03-15 PROCEDURE — ? PRESCRIPTION

## 2022-03-15 PROCEDURE — 99214 OFFICE O/P EST MOD 30 MIN: CPT

## 2022-03-15 PROCEDURE — ? ADDITIONAL NOTES

## 2022-03-15 RX ORDER — RUXOLITINIB 15 MG/G
1 CREAM TOPICAL QD
Qty: 60 | Refills: 4 | Status: ERX | COMMUNITY
Start: 2022-03-15

## 2022-03-15 RX ADMIN — RUXOLITINIB 1: 15 CREAM TOPICAL at 00:00

## 2022-03-15 ASSESSMENT — LOCATION DETAILED DESCRIPTION DERM
LOCATION DETAILED: RIGHT ELBOW
LOCATION DETAILED: LEFT MID DORSAL INDEX FINGER
LOCATION DETAILED: RIGHT MID DORSAL INDEX FINGER
LOCATION DETAILED: LEFT ELBOW
LOCATION DETAILED: LEFT FOREHEAD

## 2022-03-15 ASSESSMENT — LOCATION SIMPLE DESCRIPTION DERM
LOCATION SIMPLE: LEFT INDEX FINGER
LOCATION SIMPLE: RIGHT INDEX FINGER
LOCATION SIMPLE: LEFT FOREHEAD
LOCATION SIMPLE: RIGHT ELBOW
LOCATION SIMPLE: LEFT ELBOW

## 2022-03-15 ASSESSMENT — LOCATION ZONE DERM
LOCATION ZONE: FINGER
LOCATION ZONE: FACE
LOCATION ZONE: ARM

## 2022-03-15 NOTE — PROCEDURE: ADDITIONAL NOTES
Additional Notes: Discussed with patient options for treatment, UVB light vs. Mohan inhibitor topicals.\\nPatient would like to treat with both UVB light and topical.
Render Risk Assessment In Note?: no
Detail Level: Generalized

## 2022-03-30 ENCOUNTER — HOSPITAL ENCOUNTER (OUTPATIENT)
Dept: RADIOLOGY | Facility: MEDICAL CENTER | Age: 62
End: 2022-03-30
Attending: STUDENT IN AN ORGANIZED HEALTH CARE EDUCATION/TRAINING PROGRAM
Payer: COMMERCIAL

## 2022-03-30 DIAGNOSIS — Z12.31 VISIT FOR SCREENING MAMMOGRAM: ICD-10-CM

## 2022-03-30 PROCEDURE — 77063 BREAST TOMOSYNTHESIS BI: CPT

## 2022-03-31 ENCOUNTER — OFFICE VISIT (OUTPATIENT)
Dept: MEDICAL GROUP | Facility: PHYSICIAN GROUP | Age: 62
End: 2022-03-31
Payer: COMMERCIAL

## 2022-03-31 VITALS
TEMPERATURE: 97 F | BODY MASS INDEX: 25.83 KG/M2 | OXYGEN SATURATION: 100 % | DIASTOLIC BLOOD PRESSURE: 60 MMHG | SYSTOLIC BLOOD PRESSURE: 118 MMHG | HEART RATE: 88 BPM | HEIGHT: 61 IN | WEIGHT: 136.8 LBS | RESPIRATION RATE: 16 BRPM

## 2022-03-31 DIAGNOSIS — F41.1 GENERALIZED ANXIETY DISORDER: ICD-10-CM

## 2022-03-31 DIAGNOSIS — R22.31 AXILLARY LUMP, RIGHT: ICD-10-CM

## 2022-03-31 DIAGNOSIS — M54.50 ACUTE BILATERAL LOW BACK PAIN WITHOUT SCIATICA: ICD-10-CM

## 2022-03-31 PROCEDURE — 99214 OFFICE O/P EST MOD 30 MIN: CPT | Performed by: STUDENT IN AN ORGANIZED HEALTH CARE EDUCATION/TRAINING PROGRAM

## 2022-03-31 RX ORDER — ALPRAZOLAM 0.5 MG/1
0.25 TABLET ORAL NIGHTLY PRN
Qty: 30 TABLET | Refills: 0 | Status: SHIPPED | OUTPATIENT
Start: 2022-03-31 | End: 2022-05-30

## 2022-03-31 ASSESSMENT — FIBROSIS 4 INDEX: FIB4 SCORE: 0.76

## 2022-03-31 NOTE — PROGRESS NOTES
"CC:  Diagnoses of Acute bilateral low back pain without sciatica, Generalized anxiety disorder, and Axillary lump, right were pertinent to this visit.    HISTORY OF THE PRESENT ILLNESS: Patient is a 61 y.o. female. This pleasant patient is here today to discuss low back pain, right axillary lump, anxiety.    1.  Axillary lump, right  This was noticed by the patient's  a few weeks ago.  Patient did have a mammogram yesterday and we are still waiting on results.  Patient is not able to feel this herself.    2.  Anxiety  Patient is at her baseline state of anxiety.  She is here today requesting refills of medications.    3.  Acute bilateral low back pain without sciatica  Onset\" a few months ago\".  Location bilateral low to mid back.  Both sides are equal.  She negates that this pain exists all the time.  Activity makes this worse.  She has attempted treatment with massage.  She does not recall any overuse activity or inciting event.  She is employed as a  and spends some time both in the desk and some time walking.    No problem-specific Assessment & Plan notes found for this encounter.      Current Outpatient Medications Ordered in Epic   Medication Sig Dispense Refill   • ALPRAZolam (XANAX) 0.5 MG Tab Take 0.5 Tablets by mouth at bedtime as needed for Anxiety for up to 60 days. 30 Tablet 0   • DULoxetine (CYMBALTA) 30 MG Cap DR Particles Take 1 Capsule by mouth every day. 90 Capsule 0   • levothyroxine (SYNTHROID) 112 MCG Tab Take 1 Tablet by mouth every morning on an empty stomach. 90 Tablet 3   • diphenhydrAMINE (BENADRYL) 25 MG Tab Take 25 mg by mouth every evening.     • loratadine (CLARITIN) 10 MG Tab Take 10 mg by mouth every day.       No current [facility]-ordered facility-administered medications on file.     ROS:   Gen: no fevers/chills, unplanned changes in weight  See HPI.    Objective:     Exam: /60 (BP Location: Left arm, Patient Position: Sitting, BP Cuff Size: Adult)   Pulse " "88   Temp 36.1 °C (97 °F) (Temporal)   Resp 16   Ht 1.549 m (5' 1\")   Wt 62.1 kg (136 lb 12.8 oz)   SpO2 100%  Body mass index is 25.85 kg/m².    General: Normal appearing. No distress.  Lymph: No cervical or supraclavicular lymph nodes are palpable  Skin: Warm and dry.  No obvious lesions.  Musculoskeletal: Normal gait. No extremity cyanosis, clubbing, or edema.  Normal active ROM of the back.  Right axilla: No abnormality could be detected by palpation or visualization.  Patient reported no tenderness.  Psych: Normal mood and affect. Alert and oriented x3. Judgment and insight is normal.    A chaperone was offered to the patient during today's exam.  Lesley Leyva MA was present for the axillary exam.    Labs:   3/30/2022:  - mammogram,   \"1.  Breasts have scattered areas of fibroglandular density, with no radiographic evidence of malignancy and no interval change.\"    Assessment & Plan:   61 y.o. female with the following -    1.  Axillary lump, right  -Undiagnosed new problem.  -I was not able to detect any abnormality on exam.  -Before completing this note the patient's mammogram became available and no abnormality was detected by imaging.    2.  Anxiety  -Chronic, stable.  -PDMP reviewed.  -Alprazolam 0.5 mg.  Dispense 30.  Refill 0.    3.  Acute bilateral low back pain without sciatica  -Patient wishes referral to a chiropractor.    Return if symptoms worsen or fail to improve.    Please note that this dictation was created using voice recognition software. I have made every reasonable attempt to correct obvious errors, but I expect that there are errors of grammar and possibly content that I did not discover before finalizing the note.    Anastacio Lyn PA-C 3/31/2022  "

## 2022-04-05 DIAGNOSIS — F34.1 DYSTHYMIA: ICD-10-CM

## 2022-04-05 RX ORDER — DULOXETIN HYDROCHLORIDE 30 MG/1
30 CAPSULE, DELAYED RELEASE ORAL DAILY
Qty: 90 CAPSULE | Refills: 3 | Status: SHIPPED | OUTPATIENT
Start: 2022-04-05 | End: 2023-08-11

## 2022-11-08 ENCOUNTER — OFFICE VISIT (OUTPATIENT)
Dept: MEDICAL GROUP | Facility: PHYSICIAN GROUP | Age: 62
End: 2022-11-08

## 2022-11-08 ENCOUNTER — HOSPITAL ENCOUNTER (OUTPATIENT)
Dept: LAB | Facility: MEDICAL CENTER | Age: 62
End: 2022-11-08
Attending: FAMILY MEDICINE

## 2022-11-08 VITALS
HEIGHT: 61 IN | WEIGHT: 135 LBS | DIASTOLIC BLOOD PRESSURE: 64 MMHG | TEMPERATURE: 98.1 F | SYSTOLIC BLOOD PRESSURE: 94 MMHG | OXYGEN SATURATION: 97 % | BODY MASS INDEX: 25.49 KG/M2 | HEART RATE: 81 BPM

## 2022-11-08 DIAGNOSIS — F34.1 DYSTHYMIA: ICD-10-CM

## 2022-11-08 DIAGNOSIS — Z79.899 CHRONIC USE OF BENZODIAZEPINE FOR THERAPEUTIC PURPOSE: ICD-10-CM

## 2022-11-08 DIAGNOSIS — R53.83 FATIGUE, UNSPECIFIED TYPE: ICD-10-CM

## 2022-11-08 DIAGNOSIS — F41.1 GENERALIZED ANXIETY DISORDER: Primary | ICD-10-CM

## 2022-11-08 LAB
ALBUMIN SERPL BCP-MCNC: 4.6 G/DL (ref 3.2–4.9)
ALBUMIN/GLOB SERPL: 1.5 G/DL
ALP SERPL-CCNC: 108 U/L (ref 30–99)
ALT SERPL-CCNC: 17 U/L (ref 2–50)
ANION GAP SERPL CALC-SCNC: 12 MMOL/L (ref 7–16)
AST SERPL-CCNC: 15 U/L (ref 12–45)
BILIRUB SERPL-MCNC: 0.4 MG/DL (ref 0.1–1.5)
BUN SERPL-MCNC: 17 MG/DL (ref 8–22)
CALCIUM SERPL-MCNC: 9.8 MG/DL (ref 8.5–10.5)
CHLORIDE SERPL-SCNC: 110 MMOL/L (ref 96–112)
CO2 SERPL-SCNC: 26 MMOL/L (ref 20–33)
CREAT SERPL-MCNC: 0.47 MG/DL (ref 0.5–1.4)
ERYTHROCYTE [DISTWIDTH] IN BLOOD BY AUTOMATED COUNT: 43.8 FL (ref 35.9–50)
GFR SERPLBLD CREATININE-BSD FMLA CKD-EPI: 107 ML/MIN/1.73 M 2
GLOBULIN SER CALC-MCNC: 3 G/DL (ref 1.9–3.5)
GLUCOSE SERPL-MCNC: 88 MG/DL (ref 65–99)
HCT VFR BLD AUTO: 39.5 % (ref 37–47)
HGB BLD-MCNC: 12.8 G/DL (ref 12–16)
MCH RBC QN AUTO: 29.6 PG (ref 27–33)
MCHC RBC AUTO-ENTMCNC: 32.4 G/DL (ref 33.6–35)
MCV RBC AUTO: 91.4 FL (ref 81.4–97.8)
PLATELET # BLD AUTO: 313 K/UL (ref 164–446)
PMV BLD AUTO: 11.1 FL (ref 9–12.9)
POTASSIUM SERPL-SCNC: 4.8 MMOL/L (ref 3.6–5.5)
PROT SERPL-MCNC: 7.6 G/DL (ref 6–8.2)
RBC # BLD AUTO: 4.32 M/UL (ref 4.2–5.4)
SODIUM SERPL-SCNC: 148 MMOL/L (ref 135–145)
T4 FREE SERPL-MCNC: 1.66 NG/DL (ref 0.93–1.7)
TSH SERPL DL<=0.005 MIU/L-ACNC: 0.01 UIU/ML (ref 0.38–5.33)
WBC # BLD AUTO: 4.7 K/UL (ref 4.8–10.8)

## 2022-11-08 PROCEDURE — 84439 ASSAY OF FREE THYROXINE: CPT

## 2022-11-08 PROCEDURE — 80053 COMPREHEN METABOLIC PANEL: CPT

## 2022-11-08 PROCEDURE — 99214 OFFICE O/P EST MOD 30 MIN: CPT | Performed by: FAMILY MEDICINE

## 2022-11-08 PROCEDURE — 84443 ASSAY THYROID STIM HORMONE: CPT

## 2022-11-08 PROCEDURE — 85027 COMPLETE CBC AUTOMATED: CPT

## 2022-11-08 PROCEDURE — 36415 COLL VENOUS BLD VENIPUNCTURE: CPT

## 2022-11-08 RX ORDER — ALPRAZOLAM 0.5 MG/1
0.5 TABLET ORAL NIGHTLY PRN
Qty: 30 TABLET | Refills: 0 | Status: SHIPPED | OUTPATIENT
Start: 2022-11-08 | End: 2023-01-07

## 2022-11-08 ASSESSMENT — PATIENT HEALTH QUESTIONNAIRE - PHQ9
8. MOVING OR SPEAKING SO SLOWLY THAT OTHER PEOPLE COULD HAVE NOTICED. OR THE OPPOSITE, BEING SO FIGETY OR RESTLESS THAT YOU HAVE BEEN MOVING AROUND A LOT MORE THAN USUAL: NOT AT ALL
SUM OF ALL RESPONSES TO PHQ9 QUESTIONS 1 AND 2: 1
SUM OF ALL RESPONSES TO PHQ QUESTIONS 1-9: 8
4. FEELING TIRED OR HAVING LITTLE ENERGY: NEARLY EVERY DAY
5. POOR APPETITE OR OVEREATING: NOT AT ALL
2. FEELING DOWN, DEPRESSED, IRRITABLE, OR HOPELESS: SEVERAL DAYS
1. LITTLE INTEREST OR PLEASURE IN DOING THINGS: NOT AT ALL
3. TROUBLE FALLING OR STAYING ASLEEP OR SLEEPING TOO MUCH: MORE THAN HALF THE DAYS
6. FEELING BAD ABOUT YOURSELF - OR THAT YOU ARE A FAILURE OR HAVE LET YOURSELF OR YOUR FAMILY DOWN: SEVERAL DAYS
7. TROUBLE CONCENTRATING ON THINGS, SUCH AS READING THE NEWSPAPER OR WATCHING TELEVISION: NOT AT ALL
9. THOUGHTS THAT YOU WOULD BE BETTER OFF DEAD, OR OF HURTING YOURSELF: SEVERAL DAYS

## 2022-11-08 ASSESSMENT — ENCOUNTER SYMPTOMS
SHORTNESS OF BREATH: 0
CHILLS: 0
FEVER: 0

## 2022-11-08 ASSESSMENT — FIBROSIS 4 INDEX: FIB4 SCORE: 0.77

## 2022-11-08 NOTE — PROGRESS NOTES
Subjective:     CC: fatigue, xanax refill    HPI:   Jordyn presents today with    Problem   Fatigue    Few weeks  Will sleep 7+ hours, but still feels tired when she wakes up  Have to take a nap  Exercising regularly, but not helping  +constipation - mild, chronic  No cold intolerance  No snoring, no apneic episodes     Dysthymia    Chronic. Reports depression for many years  Current med: duloxetine 30 mg daily  SI/HI: yes/no - reports no plan    Depression Screening    Little interest or pleasure in doing things?   Not at all  Feeling down, depressed , or hopeless?  Several days  Trouble falling or staying asleep, or sleeping too much?   More than half the days  Feeling tired or having little energy?   Nearly every day  Poor appetite or overeating?   Not at all  Feeling bad about yourself - or that you are a failure or have let yourself or your family down?  Several days  Trouble concentrating on things, such as reading the newspaper or watching television?  Not at all  Moving or speaking so slowly that other people could have noticed.  Or the opposite - being so fidgety or restless that you have been moving around a lot more than usual?   Not at all  Thoughts that you would be better off dead, or of hurting yourself?   Several days  Patient Health Questionnaire Score:  8      If depressive symptoms identified deferred to follow up visit unless specifically addressed in assesment and plan.    Interpretation of PHQ-9 Total Score   Score Severity   1-4 No Depression   5-9 Mild Depression   10-14 Moderate Depression   15-19 Moderately Severe Depression   20-27 Severe Depression         Generalized Anxiety Disorder    Chronic. Reports she has been using this for many years to manage anxiety.  Current meds: duloxetine 30 mg daily, alprazolam 0.5 mg prn    Reports she will cut the tablets in half.    Last fill: 3/31/22, #30, 60 days, 0 left  CS Agreement: due  UDS: never completed         Health Maintenance:  "Completed    ROS:  Review of Systems   Constitutional:  Negative for chills and fever.   Respiratory:  Negative for shortness of breath.    Cardiovascular:  Negative for chest pain.     Objective:     Exam:  BP (!) 94/64 (BP Location: Right arm, Patient Position: Sitting, BP Cuff Size: Adult)   Pulse 81   Temp 36.7 °C (98.1 °F) (Temporal)   Ht 1.549 m (5' 1\")   Wt 61.2 kg (135 lb)   SpO2 97%   BMI 25.51 kg/m²  Body mass index is 25.51 kg/m².    Physical Exam  Constitutional:       Appearance: Normal appearance.   Cardiovascular:      Rate and Rhythm: Normal rate and regular rhythm.      Heart sounds: Normal heart sounds.   Pulmonary:      Effort: Pulmonary effort is normal.      Breath sounds: Normal breath sounds.   Musculoskeletal:      Cervical back: Normal range of motion and neck supple.   Neurological:      Mental Status: She is alert.       Assessment & Plan:     62 y.o. female with the following -     1. Generalized anxiety disorder  2. Chronic use of benzodiazepine for therapeutic purpose  Chronic, stable.  She reports she is use Xanax for years to manage her anxiety.  She will take a half a tablet as needed.  Her last fill was in March, 2022 and is currently out of tablets.  Informed consent and controlled substance treatment agreement obtained today.  There is no history of urine drug screen on file but since I am not her PCP I did not order it today.Obtained and reviewed patient utilization report from [facility] pharmacy database on 11/8/2022 9:16 AM  prior to writing prescription for controlled substance II, III or IV per [place] bill . Based on assessment of the report, the prescription is medically necessary.   - Controlled Substance Treatment Agreement  - ALPRAZolam (XANAX) 0.5 MG Tab; Take 1 Tablet by mouth at bedtime as needed for Sleep for up to 60 days.  Dispense: 30 Tablet; Refill: 0    3. Dysthymia  Chronic, stable.  She reports she was diagnosed with depression many years ago but " is listed as dysthymia in her chart.  She reports this is why she is on the duloxetine, not for the anxiety.  PHQ-9 does indicate mild depression and I wonder if this is contributing to her fatigue but she did not want to change her medication around until we got the labs for the fatigue.  We will continue her current dosing for now.  - Duloxetine 30 mg daily  - Consider increasing to 40 mg daily if labs are normal    4. Fatigue, unspecified type  Acute.  She reports for last few weeks has been feeling increasingly fatigued.  She states that she will sleep 7 or more hours but still feels tired when she wakes up and has to take a nap during the day.  She is exercising regularly but this is not helping.  She does have a history of hypothyroidism but does not only seem to have any symptoms of hypothyroid but we will evaluate those labs to be sure.  She also denies any snoring or witnessed apneic episodes so sleep apnea seems a less likely cause for her fatigue.  As discussed above I wonder if it is related to her depression but we will check some screening lab work first.  - CBC WITHOUT DIFFERENTIAL; Future  - Comp Metabolic Panel; Future  - TSH WITH REFLEX TO FT4; Future    Referral for genetic research was offered. Patient is a participant..    Return if symptoms worsen or fail to improve.    Please note that this dictation was created using voice recognition software. I have made every reasonable attempt to correct obvious errors, but I expect that there are errors of grammar and possibly content that I did not discover before finalizing the note.

## 2022-11-08 NOTE — Clinical Note
Of note - she still does not have insurance so I did give her the names of 2 clinics in [facility] that can base her payments based on insurance and financial status, [facility] and [facility].

## 2022-11-10 DIAGNOSIS — E89.0 POSTOPERATIVE HYPOTHYROIDISM: ICD-10-CM

## 2022-11-10 DIAGNOSIS — E55.9 HYPOVITAMINOSIS D: ICD-10-CM

## 2022-11-10 DIAGNOSIS — R53.83 FATIGUE, UNSPECIFIED TYPE: ICD-10-CM

## 2022-11-10 DIAGNOSIS — R74.8 ELEVATED ALKALINE PHOSPHATASE MEASUREMENT: ICD-10-CM

## 2022-11-10 RX ORDER — LEVOTHYROXINE SODIUM 0.1 MG/1
100 TABLET ORAL
Qty: 30 TABLET | Refills: 2 | Status: SHIPPED | OUTPATIENT
Start: 2022-11-10 | End: 2023-03-08

## 2022-12-04 NOTE — PROCEDURE: ADDITIONAL NOTES
Additional Notes: Discussed with patient options of treatment, excimer. Patient agrees depending on price and what insurance will cover.
Render Risk Assessment In Note?: no
Detail Level: Generalized
Additional Notes: Gave patient a recommendation for a OTC medication with lidocaine.
5

## 2023-02-11 LAB
25(OH)D3+25(OH)D2 SERPL-MCNC: 53.9 NG/ML (ref 30–100)
ALBUMIN SERPL-MCNC: 4.7 G/DL (ref 3.8–4.8)
ALBUMIN/GLOB SERPL: 1.9 {RATIO} (ref 1.2–2.2)
ALP SERPL-CCNC: 116 IU/L (ref 44–121)
ALT SERPL-CCNC: 18 IU/L (ref 0–32)
AST SERPL-CCNC: 18 IU/L (ref 0–40)
BILIRUB SERPL-MCNC: 0.3 MG/DL (ref 0–1.2)
BUN SERPL-MCNC: 16 MG/DL (ref 8–27)
BUN/CREAT SERPL: 24 (ref 12–28)
CALCIUM SERPL-MCNC: 9.9 MG/DL (ref 8.7–10.3)
CHLORIDE SERPL-SCNC: 105 MMOL/L (ref 96–106)
CO2 SERPL-SCNC: 26 MMOL/L (ref 20–29)
CREAT SERPL-MCNC: 0.67 MG/DL (ref 0.57–1)
EGFRCR SERPLBLD CKD-EPI 2021: 99 ML/MIN/1.73
ERYTHROCYTE [DISTWIDTH] IN BLOOD BY AUTOMATED COUNT: 12.3 % (ref 11.7–15.4)
GLOBULIN SER CALC-MCNC: 2.5 G/DL (ref 1.5–4.5)
GLUCOSE SERPL-MCNC: 95 MG/DL (ref 70–99)
HCT VFR BLD AUTO: 37.3 % (ref 34–46.6)
HGB BLD-MCNC: 12.6 G/DL (ref 11.1–15.9)
MCH RBC QN AUTO: 29.4 PG (ref 26.6–33)
MCHC RBC AUTO-ENTMCNC: 33.8 G/DL (ref 31.5–35.7)
MCV RBC AUTO: 87 FL (ref 79–97)
NRBC BLD AUTO-RTO: NORMAL %
PLATELET # BLD AUTO: 317 X10E3/UL (ref 150–450)
POTASSIUM SERPL-SCNC: 4.4 MMOL/L (ref 3.5–5.2)
PROT SERPL-MCNC: 7.2 G/DL (ref 6–8.5)
PTH-INTACT SERPL-MCNC: 16 PG/ML (ref 15–65)
RBC # BLD AUTO: 4.28 X10E6/UL (ref 3.77–5.28)
SODIUM SERPL-SCNC: 144 MMOL/L (ref 134–144)
TSH SERPL DL<=0.005 MIU/L-ACNC: 0.81 UIU/ML (ref 0.45–4.5)
WBC # BLD AUTO: 5.6 X10E3/UL (ref 3.4–10.8)

## 2023-02-14 DIAGNOSIS — E89.0 POSTOPERATIVE HYPOTHYROIDISM: ICD-10-CM

## 2023-05-17 DIAGNOSIS — M79.601 RIGHT ARM PAIN: ICD-10-CM

## 2023-08-11 ENCOUNTER — OFFICE VISIT (OUTPATIENT)
Dept: MEDICAL GROUP | Facility: PHYSICIAN GROUP | Age: 63
End: 2023-08-11
Payer: COMMERCIAL

## 2023-08-11 VITALS
TEMPERATURE: 97.9 F | WEIGHT: 141 LBS | OXYGEN SATURATION: 100 % | DIASTOLIC BLOOD PRESSURE: 68 MMHG | HEIGHT: 61 IN | BODY MASS INDEX: 26.62 KG/M2 | HEART RATE: 94 BPM | SYSTOLIC BLOOD PRESSURE: 118 MMHG

## 2023-08-11 DIAGNOSIS — E66.3 OVERWEIGHT (BMI 25.0-29.9): ICD-10-CM

## 2023-08-11 DIAGNOSIS — F41.1 GENERALIZED ANXIETY DISORDER: ICD-10-CM

## 2023-08-11 PROCEDURE — 3074F SYST BP LT 130 MM HG: CPT | Performed by: STUDENT IN AN ORGANIZED HEALTH CARE EDUCATION/TRAINING PROGRAM

## 2023-08-11 PROCEDURE — 99214 OFFICE O/P EST MOD 30 MIN: CPT | Performed by: STUDENT IN AN ORGANIZED HEALTH CARE EDUCATION/TRAINING PROGRAM

## 2023-08-11 PROCEDURE — 3078F DIAST BP <80 MM HG: CPT | Performed by: STUDENT IN AN ORGANIZED HEALTH CARE EDUCATION/TRAINING PROGRAM

## 2023-08-11 RX ORDER — PHENTERMINE AND TOPIRAMATE 7.5; 46 MG/1; MG/1
1 CAPSULE, EXTENDED RELEASE ORAL DAILY
Qty: 76 CAPSULE | Refills: 0 | Status: SHIPPED | OUTPATIENT
Start: 2023-08-26 | End: 2023-11-10

## 2023-08-11 RX ORDER — ALPRAZOLAM 0.5 MG/1
0.5 TABLET ORAL NIGHTLY PRN
Qty: 45 TABLET | Refills: 0 | Status: SHIPPED | OUTPATIENT
Start: 2023-08-11 | End: 2024-02-20

## 2023-08-11 RX ORDER — LIOTHYRONINE SODIUM 5 UG/1
TABLET ORAL
COMMUNITY
Start: 2023-07-31

## 2023-08-11 RX ORDER — PHENTERMINE AND TOPIRAMATE 3.75; 23 MG/1; MG/1
1 CAPSULE, EXTENDED RELEASE ORAL DAILY
Qty: 14 CAPSULE | Refills: 0 | Status: SHIPPED | OUTPATIENT
Start: 2023-08-11 | End: 2023-08-25

## 2023-08-11 ASSESSMENT — FIBROSIS 4 INDEX: FIB4 SCORE: 0.84

## 2023-08-11 ASSESSMENT — PATIENT HEALTH QUESTIONNAIRE - PHQ9: CLINICAL INTERPRETATION OF PHQ2 SCORE: 0

## 2023-08-11 NOTE — PROGRESS NOTES
CC:  Diagnoses of Overweight (BMI 25.0-29.9) and Generalized anxiety disorder were pertinent to this visit.    HISTORY OF THE PRESENT ILLNESS: Patient is a 63 y.o. female. This pleasant patient is here today to discuss:    1. Overweight (BMI 25.0-29.9)  Patient is very concerned and preoccupied with her weight despite having a BMI that is just over the overweight threshold.  She has attempted diet and exercise with very poor results.  She feels she would be happy if she could just get under the 25 BMI threshold again.  Her weight has been fairly stable, only gaining 2 pounds since her initial visit with me on 8/26/2021 where she 133 pounds.    Discussed multiple weight loss options including referral to registered dietitian, dietary changes, exercise, alternative medication regimens.  Patient would very much like to try Qsymia.  I explained to her that this medication was very unlikely to be covered by insurance and that she would have to pay for this cash that when she was at a normal BMI this medication would no longer be appropriate and that I would no longer prescribe it for her at that point and it was likely that most other providers would also decline.    2. Generalized anxiety disorder  Anxiety symptoms at baseline.  Patient continues to use 1 tablet, 0.5 mg about every other day, sometimes splitting these in half.  She very much wants me to give her a larger supply of this medication today but I informed her that a 90-day supply is the maximum that I will prescribe.      Active Diagnosis:    Patient Active Problem List   Diagnosis    History of thyroid cancer    Postoperative hypothyroidism    Generalized anxiety disorder    Dysthymia    Globus sensation    Chest pressure    Acute bilateral low back pain without sciatica    Fatigue    Hypovitaminosis D    Elevated alkaline phosphatase measurement    Overweight (BMI 25.0-29.9)      Current Outpatient Medications Ordered in Epic   Medication Sig Dispense  "Refill    liothyronine (CYTOMEL) 5 MCG Tab       ALPRAZolam (XANAX) 0.5 MG Tab Take 1 Tablet by mouth at bedtime as needed for Anxiety for up to 90 days. 45 Tablet 0    [START ON 8/26/2023] Phentermine-Topiramate (QSYMIA) 7.5-46 MG CAPSULE SR 24 HR Take 1 Capsule by mouth every day for 76 days. 76 Capsule 0    Phentermine-Topiramate (QSYMIA) 3.75-23 MG CAPSULE SR 24 HR Take 1 Capsule by mouth every day for 14 days. 14 Capsule 0    levothyroxine (SYNTHROID) 100 MCG Tab TAKE ONE TABLET BY MOUTH EVERY MORNING ON AN EMPTY STOMACH 90 Tablet 1    Cholecalciferol 32174 UNIT Cap Take 1 Capsule by mouth every 7 days. 12 Capsule 0    diphenhydrAMINE (BENADRYL) 25 MG Tab Take 1 Tablet by mouth every evening.      loratadine (CLARITIN) 10 MG Tab Take 1 Tablet by mouth every day.       No current [facility]-ordered facility-administered medications on file.     ROS:   See HPI.    Objective:     Exam: /68 (BP Location: Right arm, Patient Position: Sitting, BP Cuff Size: Adult)   Pulse 94   Temp 36.6 °C (97.9 °F) (Temporal)   Ht 1.549 m (5' 1\")   Wt 64 kg (141 lb)   SpO2 100%  Body mass index is 26.64 kg/m².    General: Normal appearing. No distress.  neurologic: Grossly nonfocal.    Skin: Warm and dry.  No obvious lesions.  Musculoskeletal: No extremity cyanosis, clubbing, or edema.  Psych: Normal mood and affect.           Assessment & Plan:   63 y.o. female with the following -    Labs:   2/10/2023:  -CMP, WNL    1. Overweight (BMI 25.0-29.9)  -Chronic.  -Controlled substance agreement from 11/8/2022 reviewed.  -PDMP reviewed.  - Phentermine-Topiramate (QSYMIA) 7.5-46 MG CAPSULE SR 24 HR; Take 1 Capsule by mouth every day for 76 days.  Dispense: 76 Capsule; Refill: 0  - Phentermine-Topiramate (QSYMIA) 3.75-23 MG CAPSULE SR 24 HR; Take 1 Capsule by mouth every day for 14 days.  Dispense: 14 Capsule; Refill: 0    2. Generalized anxiety disorder  -Chronic, stable.  -Controlled substance agreement from 11/8/2020 to " review.  -PDMP reviewed.  - ALPRAZolam (XANAX) 0.5 MG Tab; Take 1 Tablet by mouth at bedtime as needed for Anxiety for up to 90 days.  Dispense: 45 Tablet; Refill: 0    Return in about 3 months (around 11/11/2023).    Please note that this dictation was created using voice recognition software. I have made every reasonable attempt to correct obvious errors, but I expect that there are errors of grammar and possibly content that I did not discover before finalizing the note.      Anastacio Lyn PA-C 8/11/2023

## 2023-08-15 DIAGNOSIS — E66.3 OVERWEIGHT (BMI 25.0-29.9): ICD-10-CM

## 2023-08-16 RX ORDER — PHENTERMINE AND TOPIRAMATE 3.75; 23 MG/1; MG/1
1 CAPSULE, EXTENDED RELEASE ORAL DAILY
Qty: 14 CAPSULE | Refills: 0 | OUTPATIENT
Start: 2023-08-16 | End: 2023-08-30

## 2023-08-18 ENCOUNTER — DOCUMENTATION (OUTPATIENT)
Dept: HEALTH INFORMATION MANAGEMENT | Facility: OTHER | Age: 63
End: 2023-08-18
Payer: COMMERCIAL

## 2023-08-19 LAB
25(OH)D3+25(OH)D2 SERPL-MCNC: 53.8 NG/ML (ref 30–100)
ALBUMIN SERPL-MCNC: 4.7 G/DL (ref 3.9–4.9)
ALBUMIN/GLOB SERPL: 1.6 {RATIO} (ref 1.2–2.2)
ALP SERPL-CCNC: 114 IU/L (ref 44–121)
ALT SERPL-CCNC: 39 IU/L (ref 0–32)
AST SERPL-CCNC: 24 IU/L (ref 0–40)
BILIRUB SERPL-MCNC: 0.4 MG/DL (ref 0–1.2)
BUN SERPL-MCNC: 18 MG/DL (ref 8–27)
BUN/CREAT SERPL: 30 (ref 12–28)
CALCIUM SERPL-MCNC: 9.7 MG/DL (ref 8.7–10.3)
CHLORIDE SERPL-SCNC: 108 MMOL/L (ref 96–106)
CO2 SERPL-SCNC: 23 MMOL/L (ref 20–29)
CREAT SERPL-MCNC: 0.61 MG/DL (ref 0.57–1)
EGFRCR SERPLBLD CKD-EPI 2021: 100 ML/MIN/1.73
ERYTHROCYTE [DISTWIDTH] IN BLOOD BY AUTOMATED COUNT: 12.3 % (ref 11.7–15.4)
GLOBULIN SER CALC-MCNC: 2.9 G/DL (ref 1.5–4.5)
GLUCOSE SERPL-MCNC: 100 MG/DL (ref 70–99)
HCT VFR BLD AUTO: 39.7 % (ref 34–46.6)
HGB BLD-MCNC: 13.3 G/DL (ref 11.1–15.9)
MCH RBC QN AUTO: 30 PG (ref 26.6–33)
MCHC RBC AUTO-ENTMCNC: 33.5 G/DL (ref 31.5–35.7)
MCV RBC AUTO: 90 FL (ref 79–97)
NRBC BLD AUTO-RTO: NORMAL %
PLATELET # BLD AUTO: 275 X10E3/UL (ref 150–450)
POTASSIUM SERPL-SCNC: 4.7 MMOL/L (ref 3.5–5.2)
PROT SERPL-MCNC: 7.6 G/DL (ref 6–8.5)
PTH-INTACT SERPL-MCNC: 29 PG/ML (ref 15–65)
RBC # BLD AUTO: 4.43 X10E6/UL (ref 3.77–5.28)
SODIUM SERPL-SCNC: 146 MMOL/L (ref 134–144)
TSH SERPL DL<=0.005 MIU/L-ACNC: 0.29 UIU/ML (ref 0.45–4.5)
WBC # BLD AUTO: 4.7 X10E3/UL (ref 3.4–10.8)

## 2023-08-23 DIAGNOSIS — R74.01 TRANSAMINITIS: ICD-10-CM

## 2023-08-23 DIAGNOSIS — E89.0 POSTOPERATIVE HYPOTHYROIDISM: ICD-10-CM

## 2023-08-23 RX ORDER — LEVOTHYROXINE SODIUM 88 UG/1
88 TABLET ORAL
Qty: 30 TABLET | Refills: 2 | Status: SHIPPED | OUTPATIENT
Start: 2023-08-23 | End: 2023-10-30 | Stop reason: SDUPTHER

## 2023-09-05 ENCOUNTER — HOSPITAL ENCOUNTER (EMERGENCY)
Facility: MEDICAL CENTER | Age: 63
End: 2023-09-05
Attending: EMERGENCY MEDICINE
Payer: COMMERCIAL

## 2023-09-05 ENCOUNTER — APPOINTMENT (OUTPATIENT)
Dept: RADIOLOGY | Facility: MEDICAL CENTER | Age: 63
End: 2023-09-05
Attending: EMERGENCY MEDICINE
Payer: COMMERCIAL

## 2023-09-05 ENCOUNTER — OFFICE VISIT (OUTPATIENT)
Dept: URGENT CARE | Facility: CLINIC | Age: 63
End: 2023-09-05
Payer: COMMERCIAL

## 2023-09-05 VITALS
HEIGHT: 61 IN | HEART RATE: 80 BPM | SYSTOLIC BLOOD PRESSURE: 128 MMHG | BODY MASS INDEX: 26.62 KG/M2 | WEIGHT: 141 LBS | DIASTOLIC BLOOD PRESSURE: 80 MMHG | TEMPERATURE: 97.3 F | OXYGEN SATURATION: 98 % | RESPIRATION RATE: 16 BRPM

## 2023-09-05 VITALS
HEART RATE: 83 BPM | TEMPERATURE: 97.8 F | BODY MASS INDEX: 26.01 KG/M2 | HEIGHT: 61 IN | SYSTOLIC BLOOD PRESSURE: 101 MMHG | RESPIRATION RATE: 16 BRPM | DIASTOLIC BLOOD PRESSURE: 72 MMHG | OXYGEN SATURATION: 98 % | WEIGHT: 137.79 LBS

## 2023-09-05 DIAGNOSIS — R10.13 EPIGASTRIC PAIN: ICD-10-CM

## 2023-09-05 DIAGNOSIS — T88.7XXA MEDICATION SIDE EFFECT: ICD-10-CM

## 2023-09-05 DIAGNOSIS — R10.9 ABDOMINAL DISCOMFORT: ICD-10-CM

## 2023-09-05 DIAGNOSIS — R10.10 PAIN OF UPPER ABDOMEN: ICD-10-CM

## 2023-09-05 DIAGNOSIS — R11.0 NAUSEA: ICD-10-CM

## 2023-09-05 LAB
ALBUMIN SERPL BCP-MCNC: 5 G/DL (ref 3.2–4.9)
ALBUMIN/GLOB SERPL: 1.5 G/DL
ALP SERPL-CCNC: 108 U/L (ref 30–99)
ALT SERPL-CCNC: 13 U/L (ref 2–50)
ANION GAP SERPL CALC-SCNC: 15 MMOL/L (ref 7–16)
AST SERPL-CCNC: 15 U/L (ref 12–45)
BASOPHILS # BLD AUTO: 0.5 % (ref 0–1.8)
BASOPHILS # BLD: 0.03 K/UL (ref 0–0.12)
BILIRUB SERPL-MCNC: 0.3 MG/DL (ref 0.1–1.5)
BUN SERPL-MCNC: 12 MG/DL (ref 8–22)
CALCIUM ALBUM COR SERPL-MCNC: 9.2 MG/DL (ref 8.5–10.5)
CALCIUM SERPL-MCNC: 10 MG/DL (ref 8.4–10.2)
CHLORIDE SERPL-SCNC: 102 MMOL/L (ref 96–112)
CO2 SERPL-SCNC: 24 MMOL/L (ref 20–33)
CREAT SERPL-MCNC: 0.59 MG/DL (ref 0.5–1.4)
EOSINOPHIL # BLD AUTO: 0.19 K/UL (ref 0–0.51)
EOSINOPHIL NFR BLD: 3.1 % (ref 0–6.9)
ERYTHROCYTE [DISTWIDTH] IN BLOOD BY AUTOMATED COUNT: 40.9 FL (ref 35.9–50)
GFR SERPLBLD CREATININE-BSD FMLA CKD-EPI: 101 ML/MIN/1.73 M 2
GLOBULIN SER CALC-MCNC: 3.3 G/DL (ref 1.9–3.5)
GLUCOSE SERPL-MCNC: 94 MG/DL (ref 65–99)
HCG SERPL QL: NEGATIVE
HCT VFR BLD AUTO: 40.8 % (ref 37–47)
HGB BLD-MCNC: 13.5 G/DL (ref 12–16)
IMM GRANULOCYTES # BLD AUTO: 0.01 K/UL (ref 0–0.11)
IMM GRANULOCYTES NFR BLD AUTO: 0.2 % (ref 0–0.9)
LACTATE SERPL-SCNC: 1.5 MMOL/L (ref 0.5–2)
LIPASE SERPL-CCNC: 24 U/L (ref 11–82)
LYMPHOCYTES # BLD AUTO: 1.95 K/UL (ref 1–4.8)
LYMPHOCYTES NFR BLD: 31.4 % (ref 22–41)
MCH RBC QN AUTO: 29.2 PG (ref 27–33)
MCHC RBC AUTO-ENTMCNC: 33.1 G/DL (ref 32.2–35.5)
MCV RBC AUTO: 88.3 FL (ref 81.4–97.8)
MONOCYTES # BLD AUTO: 0.33 K/UL (ref 0–0.85)
MONOCYTES NFR BLD AUTO: 5.3 % (ref 0–13.4)
NEUTROPHILS # BLD AUTO: 3.7 K/UL (ref 1.82–7.42)
NEUTROPHILS NFR BLD: 59.5 % (ref 44–72)
NRBC # BLD AUTO: 0 K/UL
NRBC BLD-RTO: 0 /100 WBC (ref 0–0.2)
PLATELET # BLD AUTO: 331 K/UL (ref 164–446)
PMV BLD AUTO: 10.6 FL (ref 9–12.9)
POTASSIUM SERPL-SCNC: 3.9 MMOL/L (ref 3.6–5.5)
PROCALCITONIN SERPL-MCNC: 0.03 NG/ML
PROT SERPL-MCNC: 8.3 G/DL (ref 6–8.2)
RBC # BLD AUTO: 4.62 M/UL (ref 4.2–5.4)
SODIUM SERPL-SCNC: 141 MMOL/L (ref 135–145)
WBC # BLD AUTO: 6.2 K/UL (ref 4.8–10.8)

## 2023-09-05 PROCEDURE — A9270 NON-COVERED ITEM OR SERVICE: HCPCS | Performed by: EMERGENCY MEDICINE

## 2023-09-05 PROCEDURE — 700105 HCHG RX REV CODE 258: Performed by: EMERGENCY MEDICINE

## 2023-09-05 PROCEDURE — 700111 HCHG RX REV CODE 636 W/ 250 OVERRIDE (IP): Mod: JZ | Performed by: EMERGENCY MEDICINE

## 2023-09-05 PROCEDURE — 700117 HCHG RX CONTRAST REV CODE 255: Performed by: EMERGENCY MEDICINE

## 2023-09-05 PROCEDURE — 3074F SYST BP LT 130 MM HG: CPT | Performed by: NURSE PRACTITIONER

## 2023-09-05 PROCEDURE — 83605 ASSAY OF LACTIC ACID: CPT

## 2023-09-05 PROCEDURE — 99285 EMERGENCY DEPT VISIT HI MDM: CPT

## 2023-09-05 PROCEDURE — 83690 ASSAY OF LIPASE: CPT

## 2023-09-05 PROCEDURE — 84145 PROCALCITONIN (PCT): CPT

## 2023-09-05 PROCEDURE — 99213 OFFICE O/P EST LOW 20 MIN: CPT | Performed by: NURSE PRACTITIONER

## 2023-09-05 PROCEDURE — 96374 THER/PROPH/DIAG INJ IV PUSH: CPT

## 2023-09-05 PROCEDURE — 74177 CT ABD & PELVIS W/CONTRAST: CPT

## 2023-09-05 PROCEDURE — 36415 COLL VENOUS BLD VENIPUNCTURE: CPT

## 2023-09-05 PROCEDURE — 3079F DIAST BP 80-89 MM HG: CPT | Performed by: NURSE PRACTITIONER

## 2023-09-05 PROCEDURE — 85025 COMPLETE CBC W/AUTO DIFF WBC: CPT

## 2023-09-05 PROCEDURE — 80053 COMPREHEN METABOLIC PANEL: CPT

## 2023-09-05 PROCEDURE — 84703 CHORIONIC GONADOTROPIN ASSAY: CPT

## 2023-09-05 PROCEDURE — 96375 TX/PRO/DX INJ NEW DRUG ADDON: CPT

## 2023-09-05 PROCEDURE — 700102 HCHG RX REV CODE 250 W/ 637 OVERRIDE(OP): Performed by: EMERGENCY MEDICINE

## 2023-09-05 RX ORDER — SODIUM CHLORIDE, SODIUM LACTATE, POTASSIUM CHLORIDE, CALCIUM CHLORIDE 600; 310; 30; 20 MG/100ML; MG/100ML; MG/100ML; MG/100ML
1000 INJECTION, SOLUTION INTRAVENOUS ONCE
Status: COMPLETED | OUTPATIENT
Start: 2023-09-05 | End: 2023-09-05

## 2023-09-05 RX ORDER — FAMOTIDINE 20 MG/1
20 TABLET, FILM COATED ORAL 2 TIMES DAILY
Qty: 14 TABLET | Refills: 0 | Status: SHIPPED | OUTPATIENT
Start: 2023-09-05 | End: 2023-09-12

## 2023-09-05 RX ORDER — SUCRALFATE 1 G/1
1 TABLET ORAL
Qty: 40 TABLET | Refills: 0 | Status: SHIPPED | OUTPATIENT
Start: 2023-09-05 | End: 2023-09-15

## 2023-09-05 RX ORDER — ONDANSETRON 4 MG/1
4 TABLET, ORALLY DISINTEGRATING ORAL EVERY 6 HOURS PRN
Qty: 10 TABLET | Refills: 0 | Status: SHIPPED | OUTPATIENT
Start: 2023-09-05

## 2023-09-05 RX ORDER — ONDANSETRON 2 MG/ML
4 INJECTION INTRAMUSCULAR; INTRAVENOUS ONCE
Status: COMPLETED | OUTPATIENT
Start: 2023-09-05 | End: 2023-09-05

## 2023-09-05 RX ORDER — DICYCLOMINE HCL 20 MG
20 TABLET ORAL EVERY 6 HOURS
Qty: 40 TABLET | Refills: 0 | Status: SHIPPED | OUTPATIENT
Start: 2023-09-05 | End: 2023-09-15

## 2023-09-05 RX ADMIN — FAMOTIDINE 20 MG: 10 INJECTION, SOLUTION INTRAVENOUS at 21:26

## 2023-09-05 RX ADMIN — ONDANSETRON 4 MG: 2 INJECTION INTRAMUSCULAR; INTRAVENOUS at 21:23

## 2023-09-05 RX ADMIN — IOHEXOL 100 ML: 350 INJECTION, SOLUTION INTRAVENOUS at 21:57

## 2023-09-05 RX ADMIN — LIDOCAINE HYDROCHLORIDE 30 ML: 20 SOLUTION OROPHARYNGEAL at 21:27

## 2023-09-05 RX ADMIN — SODIUM CHLORIDE, POTASSIUM CHLORIDE, SODIUM LACTATE AND CALCIUM CHLORIDE 1000 ML: 600; 310; 30; 20 INJECTION, SOLUTION INTRAVENOUS at 21:27

## 2023-09-05 ASSESSMENT — ENCOUNTER SYMPTOMS
DIZZINESS: 0
FEVER: 0
CONSTIPATION: 1
HEADACHES: 0
ABDOMINAL PAIN: 1
VOMITING: 0
TINGLING: 0
CHILLS: 0
MYALGIAS: 0
NAUSEA: 0

## 2023-09-05 ASSESSMENT — FIBROSIS 4 INDEX
FIB4 SCORE: 0.88
FIB4 SCORE: 0.88

## 2023-09-05 NOTE — PROGRESS NOTES
Subjective     GIOVANI Vidal is a 63 y.o. female who presents with Abdominal Pain (Abdominal pain , has been having constipations x 2 weeks and now woke up with  severe abd pain within upper right side )            HPI  New problem.  Patient is a 63-year-old female presents with severe abdominal pain since 1:00 this morning.  She denies fever, chills, nausea, diarrhea but does endorse constipation.  She reports the pain is a throbbing constant pain in her upper left abdominal area.  She is visibly uncomfortable here in the clinic.  She has not taken any medications for the symptoms.    Codeine and Zoloft  Current Outpatient Medications on File Prior to Visit   Medication Sig Dispense Refill    levothyroxine (SYNTHROID) 88 MCG Tab Take 1 Tablet by mouth every morning on an empty stomach. 30 Tablet 2    liothyronine (CYTOMEL) 5 MCG Tab       ALPRAZolam (XANAX) 0.5 MG Tab Take 1 Tablet by mouth at bedtime as needed for Anxiety for up to 90 days. 45 Tablet 0    Phentermine-Topiramate (QSYMIA) 7.5-46 MG CAPSULE SR 24 HR Take 1 Capsule by mouth every day for 76 days. 76 Capsule 0    Cholecalciferol 77946 UNIT Cap Take 1 Capsule by mouth every 7 days. 12 Capsule 0    diphenhydrAMINE (BENADRYL) 25 MG Tab Take 1 Tablet by mouth every evening.      loratadine (CLARITIN) 10 MG Tab Take 1 Tablet by mouth every day.       No current facility-administered medications on file prior to visit.     Social History     Socioeconomic History    Marital status:      Spouse name: Not on file    Number of children: Not on file    Years of education: Not on file    Highest education level: Not on file   Occupational History    Occupation:    Tobacco Use    Smoking status: Never    Smokeless tobacco: Never   Vaping Use    Vaping Use: Never used   Substance and Sexual Activity    Alcohol use: Yes     Alcohol/week: 0.6 oz     Types: 1 Standard drinks or equivalent per week     Comment: occ    Drug use: No    Sexual  "activity: Yes     Partners: Male     Birth control/protection: Surgical   Other Topics Concern    Not on file   Social History Narrative    Not on file     Social Determinants of Health     Financial Resource Strain: Not on file   Food Insecurity: Not on file   Transportation Needs: Not on file   Physical Activity: Not on file   Stress: Not on file   Social Connections: Not on file   Intimate Partner Violence: Not on file   Housing Stability: Not on file     Breast Cancer-related family history is not on file.      Review of Systems   Constitutional:  Negative for chills, fever and malaise/fatigue.   Gastrointestinal:  Positive for abdominal pain and constipation. Negative for nausea and vomiting.   Genitourinary: Negative.    Musculoskeletal:  Negative for myalgias.   Neurological:  Negative for dizziness, tingling and headaches.   All other systems reviewed and are negative.             Objective     /80 (BP Location: Left arm, Patient Position: Sitting, BP Cuff Size: Adult)   Pulse 80   Temp 36.3 °C (97.3 °F) (Temporal)   Resp 16   Ht 1.549 m (5' 1\")   Wt 64 kg (141 lb)   SpO2 98%   BMI 26.64 kg/m²      Physical Exam  Vitals and nursing note reviewed.   Cardiovascular:      Rate and Rhythm: Normal rate and regular rhythm.      Heart sounds: No murmur heard.  Pulmonary:      Effort: Pulmonary effort is normal.      Breath sounds: Normal breath sounds.   Abdominal:      General: Abdomen is flat. Bowel sounds are increased.      Palpations: Abdomen is soft.      Tenderness: There is abdominal tenderness in the epigastric area and periumbilical area. There is no guarding or rebound.                             Assessment & Plan        1. Pain of upper abdomen          Patient to ED for higher level of care.  VSS and she will go by POV  Differential diagnosis, natural history, supportive care, and indications for immediate follow-up were discussed.                     "

## 2023-09-06 NOTE — ED PROVIDER NOTES
"ED Provider Note    CHIEF COMPLAINT  Chief Complaint   Patient presents with    Abdominal Pain     Pt reports mid abdominal pain starting this morning.   Pt reports two suppositories, and stool softners without any relief. Pt reports last BM was this morning but was only a little.  Pt reports nausea and decreased appetite.     Constipation    Nausea     EXTERNAL RECORDS REVIEWED  Patient encounter from earlier today with the patient was seen for ongoing weeks of constipation and discomfort that then progressed to severe abdominal pain wanting her to come see her physician.  This has been escalating since 1 AM this morning and while she denies having fevers, chills, nausea or diarrhea she felt like she had had recent constipation.  She had tried OTC medications at bedtime with minimal improvement.  Noted by the historian that she seems very uncomfortable in the clinic, was sent to the emergency room for higher level of care.    HPI/ROS  LIMITATION TO HISTORY   Select: : None  OUTSIDE HISTORIAN(S):  Significant other at bedside    Jordyn Vidal is a 63 y.o. female who presents with a past medical history of esophageal dysmotility, abdominal hysterectomy presents emergency room for evaluation of ongoing dental pain of several days duration.  This escalated with what she describes as worse feelings of distention, fullness and she feels like \"I am very constipated.\"  She is taking multiple laxatives without relief.  She has had excessive burping and upper abdominal discomfort.  Without improvement of her symptoms after taking these medications and having no bowel movement since this morning she came in for further assessment as urgent care had recommended further advanced medical work-up.  She is currently having a small amount of nausea globally decreased appetite.  She feels constipated and says that she has had several abdominal surgeries in the past no prior history of pancreatitis, no known GERD, no recent fevers or " "other illness.    PAST MEDICAL HISTORY   has a past medical history of Allergy, Anesthesia, Anxiety, Cancer (HCC) (10/2013), Indigestion, and Unspecified disorder of thyroid (2013).    SURGICAL HISTORY   has a past surgical history that includes tonsillectomy; thyroidectomy total (10/19/2013); node dissection (10/19/2013); esophageal motility or manometry (1/13/2014); abdominal hysterectomy total (2003); and liposuction (9/5/2014).    FAMILY HISTORY  Family History   Problem Relation Age of Onset    Cancer Father        SOCIAL HISTORY  Social History     Tobacco Use    Smoking status: Never    Smokeless tobacco: Never   Vaping Use    Vaping Use: Never used   Substance and Sexual Activity    Alcohol use: Yes     Alcohol/week: 0.6 oz     Types: 1 Standard drinks or equivalent per week     Comment: occ    Drug use: No    Sexual activity: Yes     Partners: Male     Birth control/protection: Surgical     CURRENT MEDICATIONS  Home Medications       Reviewed by Mary Mejia R.N. (Registered Nurse) on 09/05/23 at 1949  Med List Status: Partial     Medication Last Dose Status   ALPRAZolam (XANAX) 0.5 MG Tab  Active   Cholecalciferol 76406 UNIT Cap  Active   diphenhydrAMINE (BENADRYL) 25 MG Tab  Active   levothyroxine (SYNTHROID) 88 MCG Tab  Active   liothyronine (CYTOMEL) 5 MCG Tab  Active   loratadine (CLARITIN) 10 MG Tab  Active   Phentermine-Topiramate (QSYMIA) 7.5-46 MG CAPSULE SR 24 HR  Active                  ALLERGIES  Allergies   Allergen Reactions    Codeine      GI intolerance    Zoloft Nausea     PHYSICAL EXAM  VITAL SIGNS: /72   Pulse 83   Temp 36.6 °C (97.8 °F) (Temporal)   Resp 16   Ht 1.549 m (5' 1\")   Wt 62.5 kg (137 lb 12.6 oz)   SpO2 98%   BMI 26.03 kg/m²    Genl: F sitting in gurney uncomfortably, speaking clearly, appears in mild distress   Head: NC/AT   ENT: Mucous membranes slightly dry posterior pharynx clear, uvula midline, nares patent bilaterally   Eyes: Normal sclera, pupils " equal round reactive to light  Neck: Supple, FROM, no LAD appreciated   Pulmonary: Lungs are clear to auscultation bilaterally  Chest: No TTP  CV:  RRR (88), no murmur appreciated, pulses 2+ in both upper and lower extremities,  Abdomen: soft, specific and inconsistent upper abdominal tenderness, periumbilical tenderness and left lower quadrant tenderness.  No tympany, mild generalized guarding without rebound. No masses palpated, no HSM   : no CVA tenderness   Musculoskeletal: Pain free ROM of the neck. Moving upper and lower extremities in spontaneous and coordinated fashion  Neuro: A&Ox4 (person, place, time, situation), speech fluent, gait steady, no focal deficits appreciated  Skin: No rash or lesions.  No pallor or jaundice.  No cyanosis.  Warm and dry.     DIAGNOSTIC STUDIES / PROCEDURES    LABS  Labs Reviewed   COMP METABOLIC PANEL - Abnormal; Notable for the following components:       Result Value    Alkaline Phosphatase 108 (*)     Albumin 5.0 (*)     Total Protein 8.3 (*)     All other components within normal limits   CBC WITH DIFFERENTIAL   LIPASE   LACTIC ACID   PROCALCITONIN   HCG QUAL SERUM   ESTIMATED GFR     RADIOLOGY  I have independently interpreted the diagnostic imaging associated with this visit and am waiting the final reading from the radiologist.   My preliminary interpretation is as follows: Specific low-density right hepatic lobe lesion that could be followed up with a nonemergent MRI of the liver for further characterization.  No evidence of mechanical obstruction, no evidence of steroid colitis, no evidence of intra-abdominal mass.  There is an bile duct dilation consistent with postsurgical change.   Radiologist interpretation:   CT-ABDOMEN-PELVIS WITH   Final Result         1.  Low-density right hepatic lobe lesion with additional smaller subcentimeter hepatic lesions, indeterminate and new since prior study. Recommend follow-up MRI of the liver with contrast for further  characterization.   2.  Common bile duct dilatation, commonly associated with postcholecystectomy physiology, consider other causes of biliary obstruction with additional workup as clinically appropriate.   3.  Diverticulosis        COURSE & MEDICAL DECISION MAKING    ED Observation Status? No; Patient does not meet criteria for ED Observation.     INITIAL ASSESSMENT, COURSE AND PLAN  Care Narrative: Patient is seen and evaluated for symptoms as described above.  At this time the patient has had some abdominal discomfort primarily with some upper GI like symptomology including frequent burping and sensation of fullness which could be related to peptic ulcer disease/GERD.  Cannot fully exclude other surgical emergency, she did appear to be grossly distended or having localization of her abdominal pain.    Lab work is obtained to exclude a possible surgical emergencies or gross metabolic derangements.  I have talked with the patient and confirmed that she was taking Ozempic, we discussed the medical side effects and at this time while GI complications are common, other gastrointestinal causes could also be the cause.      Patient has no leukocytosis, no gross metabolic derangements not, no concerning anemia, she has no substantial LFT elevations and no evidence of bilirubin elevation, making hepatobiliary obstructive pathology unlikely.  No evidence of pancreatitis and both lactate and procalcitonin are not elevated, pointing again away from infectious etiology.  CT scan was obtained as patient has multiple areas of scattered abdominal discomfort and this showed nonemergent hepatic lesion that can be followed up with an MRI in the outpatient setting and some postsurgical changes of the bile duct system.  There is no evidence of stones, no evidence of obstruction, there is diverticulosis without diverticulitis.  I do believe overall the patient's pain is coming from a medication side effect possibly early GERD and  peptic ulcer disease that she describes having postprandial burps and other epigastric complaints.  She will be started on symptomatic medications for her nausea, Carafate and Pepcid for GI/gastritis/esophagitis symptomology and Bentyl for her abdominal spasms and discomfort.    Patient will follow-up with her primary care physician upon discharge, given very strict return precautions should she develop fevers, chills, abdominal rigidity or inability to tolerate p.o.    HYDRATION: Based on the patient's presentation of Other initial NPO status the patient was given IV fluids. IV Hydration was used because oral hydration was not as rapid as required. Upon recheck following hydration, the patient was improved.    FINAL DIAGNOSIS  1. Epigastric pain    2. Medication side effect    3. Abdominal discomfort    4. Nausea      Electronically signed by: Garland Mccollum M.D., 9/5/2023 8:36 PM

## 2023-09-06 NOTE — DISCHARGE INSTRUCTIONS
There is a possibility that your abdominal discomfort is being caused by the medication Ozempic as gastrointestinal symptomology can be a side effect of this medication.  I would advise that we stop taking this medication until you are able to follow-up with your primary care provider.  Additionally take the medications as described above for controlling any abdominal cramping sensations.  Please continue to take your Colace to help with your bowel movements however your CT scan does not currently show any evidence of significant constipation.

## 2023-09-06 NOTE — ED TRIAGE NOTES
"Chief Complaint   Patient presents with    Abdominal Pain     Pt reports mid abdominal pain starting this morning.   Pt reports two suppositories, and stool softners without any relief. Pt reports last BM was this morning but was only a little.  Pt reports nausea and decreased appetite.     Constipation    Nausea     /74   Pulse 92   Temp 36.6 °C (97.8 °F) (Temporal)   Resp 18   Ht 1.549 m (5' 1\")   Wt 62.5 kg (137 lb 12.6 oz)   SpO2 98%   BMI 26.03 kg/m²     "

## 2023-10-20 RX ORDER — AZITHROMYCIN 250 MG/1
TABLET, FILM COATED ORAL
COMMUNITY
Start: 2023-09-22 | End: 2023-10-24

## 2023-10-20 RX ORDER — ERGOCALCIFEROL 1.25 MG/1
CAPSULE ORAL
COMMUNITY
Start: 2023-09-21

## 2023-10-24 ENCOUNTER — OFFICE VISIT (OUTPATIENT)
Dept: MEDICAL GROUP | Facility: MEDICAL CENTER | Age: 63
End: 2023-10-24
Payer: COMMERCIAL

## 2023-10-24 VITALS
BODY MASS INDEX: 25.53 KG/M2 | TEMPERATURE: 97.6 F | HEART RATE: 81 BPM | DIASTOLIC BLOOD PRESSURE: 60 MMHG | OXYGEN SATURATION: 98 % | SYSTOLIC BLOOD PRESSURE: 110 MMHG | HEIGHT: 61 IN | WEIGHT: 135.2 LBS | RESPIRATION RATE: 16 BRPM

## 2023-10-24 DIAGNOSIS — M79.601 CHRONIC PAIN OF RIGHT UPPER EXTREMITY: ICD-10-CM

## 2023-10-24 DIAGNOSIS — K83.8 COMMON BILE DUCT DILATION: ICD-10-CM

## 2023-10-24 DIAGNOSIS — K21.9 GASTROESOPHAGEAL REFLUX DISEASE, UNSPECIFIED WHETHER ESOPHAGITIS PRESENT: Primary | ICD-10-CM

## 2023-10-24 DIAGNOSIS — G89.29 CHRONIC PAIN OF RIGHT UPPER EXTREMITY: ICD-10-CM

## 2023-10-24 DIAGNOSIS — M54.50 ACUTE BILATERAL LOW BACK PAIN WITHOUT SCIATICA: ICD-10-CM

## 2023-10-24 DIAGNOSIS — K76.9 LIVER LESION: ICD-10-CM

## 2023-10-24 PROCEDURE — 3078F DIAST BP <80 MM HG: CPT | Performed by: STUDENT IN AN ORGANIZED HEALTH CARE EDUCATION/TRAINING PROGRAM

## 2023-10-24 PROCEDURE — 99214 OFFICE O/P EST MOD 30 MIN: CPT | Performed by: STUDENT IN AN ORGANIZED HEALTH CARE EDUCATION/TRAINING PROGRAM

## 2023-10-24 PROCEDURE — 3074F SYST BP LT 130 MM HG: CPT | Performed by: STUDENT IN AN ORGANIZED HEALTH CARE EDUCATION/TRAINING PROGRAM

## 2023-10-24 RX ORDER — GABAPENTIN 300 MG/1
300 CAPSULE ORAL NIGHTLY
Qty: 90 CAPSULE | Refills: 0 | Status: SHIPPED | OUTPATIENT
Start: 2023-10-24

## 2023-10-24 RX ORDER — FAMOTIDINE 20 MG/1
20 TABLET, FILM COATED ORAL 2 TIMES DAILY
Qty: 60 TABLET | Refills: 2 | Status: SHIPPED | OUTPATIENT
Start: 2023-10-24

## 2023-10-24 ASSESSMENT — PATIENT HEALTH QUESTIONNAIRE - PHQ9
9. THOUGHTS THAT YOU WOULD BE BETTER OFF DEAD, OR OF HURTING YOURSELF: NOT AT ALL
SUM OF ALL RESPONSES TO PHQ9 QUESTIONS 1 AND 2: 0
3. TROUBLE FALLING OR STAYING ASLEEP OR SLEEPING TOO MUCH: NOT AT ALL
2. FEELING DOWN, DEPRESSED, IRRITABLE, OR HOPELESS: NOT AT ALL
6. FEELING BAD ABOUT YOURSELF - OR THAT YOU ARE A FAILURE OR HAVE LET YOURSELF OR YOUR FAMILY DOWN: NOT AL ALL
7. TROUBLE CONCENTRATING ON THINGS, SUCH AS READING THE NEWSPAPER OR WATCHING TELEVISION: NOT AT ALL
1. LITTLE INTEREST OR PLEASURE IN DOING THINGS: NOT AT ALL
8. MOVING OR SPEAKING SO SLOWLY THAT OTHER PEOPLE COULD HAVE NOTICED. OR THE OPPOSITE, BEING SO FIGETY OR RESTLESS THAT YOU HAVE BEEN MOVING AROUND A LOT MORE THAN USUAL: NOT AT ALL
4. FEELING TIRED OR HAVING LITTLE ENERGY: NOT AT ALL
SUM OF ALL RESPONSES TO PHQ QUESTIONS 1-9: 0
5. POOR APPETITE OR OVEREATING: NOT AT ALL

## 2023-10-24 ASSESSMENT — ENCOUNTER SYMPTOMS
COUGH: 0
HEADACHES: 0
HEARTBURN: 1
FEVER: 0
BLOOD IN STOOL: 0
MYALGIAS: 0
NAUSEA: 0
DEPRESSION: 0
SHORTNESS OF BREATH: 0
DIZZINESS: 0
FALLS: 0

## 2023-10-24 ASSESSMENT — FIBROSIS 4 INDEX: FIB4 SCORE: 0.79

## 2023-10-24 NOTE — PROGRESS NOTES
"Subjective:     CC: Establish care, posthospital follow-up    HPI:   Jordyn presents today to establish care and discuss heartburn and right arm pain.    Health Maintenance: Completed    ROS:  Review of Systems   Constitutional:  Negative for fever, malaise/fatigue and weight loss.   Respiratory:  Negative for cough and shortness of breath.    Cardiovascular:  Negative for chest pain and leg swelling.   Gastrointestinal:  Positive for heartburn. Negative for blood in stool and nausea.   Genitourinary:  Negative for dysuria and urgency.   Musculoskeletal:  Negative for falls and myalgias.   Neurological:  Negative for dizziness and headaches.   Psychiatric/Behavioral:  Negative for depression and suicidal ideas.        Review of systems unremarkable except for concerns noted by patient or items listed.    Please see HPI for additional ROS.      Objective:     Exam:  /60 (BP Location: Left arm, Patient Position: Sitting, BP Cuff Size: Large adult long)   Pulse 81   Temp 36.4 °C (97.6 °F) (Temporal)   Resp 16   Ht 1.549 m (5' 1\") Comment: Pt reported  Wt 61.3 kg (135 lb 3.2 oz)   SpO2 98%   BMI 25.55 kg/m²  Body mass index is 25.55 kg/m².    Physical Exam  Constitutional:       Appearance: Normal appearance.   HENT:      Head: Normocephalic.   Eyes:      General: No scleral icterus.  Cardiovascular:      Rate and Rhythm: Normal rate and regular rhythm.      Pulses: Normal pulses.      Heart sounds: Normal heart sounds.   Pulmonary:      Effort: Pulmonary effort is normal.      Breath sounds: Normal breath sounds.   Abdominal:      Palpations: There is no mass.      Tenderness: There is no abdominal tenderness. There is no guarding or rebound.   Musculoskeletal:      Right lower leg: No edema.      Left lower leg: No edema.   Skin:     General: Skin is warm.   Neurological:      Mental Status: She is alert and oriented to person, place, and time.   Psychiatric:         Mood and Affect: Mood normal.         " Behavior: Behavior normal.             Labs: reviewed   CT-ABDOMEN-PELVIS WITH   Final Result           1.  Low-density right hepatic lobe lesion with additional smaller subcentimeter hepatic lesions, indeterminate and new since prior study. Recommend follow-up MRI of the liver with contrast for further characterization.   2.  Common bile duct dilatation, commonly associated with postcholecystectomy physiology, consider other causes of biliary obstruction with additional workup as clinically appropriate.   3.  Diverticulosis       Assessment & Plan:     63 y.o. female with the following -       1. Gastroesophageal reflux disease, unspecified whether esophagitis present  Chronic, improved with antacids   Patient reports improvement after starting antacids   Plan:  Continue Pepcid 20 mg daily BID   Avoid alcohol, caffeine and spicy food.  - famotidine (PEPCID) 20 MG Tab; Take 1 Tablet by mouth 2 times a day.  Dispense: 60 Tablet; Refill: 2    2. Chronic pain of right upper extremity  Unknown etiology   CT abdomen showing mildly dilated bile duct post cholecystectomy finding and new small hepatic lesions of unknown etiology    - MR-ABDOMEN-WITH & W/O; Future    3. Liver lesion  New finding, incidental finding in Imaging   Unclear etiology   Plan:  - MR-ABDOMEN-WITH & W/O; Future    4. Common bile duct dilation  Unknown etiology, found on CT abdomen as incidental finding   Slightly elevated   Check for liver vs pancreatic causes  Plan:  - LIPASE; Future  - GAMMA GT (GGT); Future        Follow up in 4 -6 weeks    Please note that this dictation was created using voice recognition software. I have made every reasonable attempt to correct obvious errors, but I expect that there are errors of grammar and possibly content that I did not discover before finalizing the note.

## 2023-10-27 ENCOUNTER — PATIENT MESSAGE (OUTPATIENT)
Dept: MEDICAL GROUP | Facility: MEDICAL CENTER | Age: 63
End: 2023-10-27
Payer: COMMERCIAL

## 2023-10-27 DIAGNOSIS — E89.0 POSTOPERATIVE HYPOTHYROIDISM: ICD-10-CM

## 2023-10-30 RX ORDER — LEVOTHYROXINE SODIUM 88 UG/1
88 TABLET ORAL
Qty: 30 TABLET | Refills: 2 | Status: SHIPPED | OUTPATIENT
Start: 2023-10-30 | End: 2024-02-22 | Stop reason: SDUPTHER

## 2023-10-30 ASSESSMENT — ENCOUNTER SYMPTOMS: WEIGHT LOSS: 0

## 2023-12-08 ENCOUNTER — PATIENT MESSAGE (OUTPATIENT)
Dept: MEDICAL GROUP | Facility: MEDICAL CENTER | Age: 63
End: 2023-12-08
Payer: COMMERCIAL

## 2023-12-08 DIAGNOSIS — E66.3 OVERWEIGHT (BMI 25.0-29.9): ICD-10-CM

## 2023-12-15 RX ORDER — PHENTERMINE AND TOPIRAMATE 3.75; 23 MG/1; MG/1
1 CAPSULE, EXTENDED RELEASE ORAL DAILY
Qty: 60 CAPSULE | Refills: 0 | Status: SHIPPED | OUTPATIENT
Start: 2023-12-15 | End: 2024-02-13

## 2023-12-15 NOTE — PROGRESS NOTES
Patient was prescribed Qsymia by her previous PCP. 2 weeks of low dose and then high dose , for total 90 days.   I will discontinue the 7.5-46 mg capsule as patient wants to stay on low dose for longer and has not picked up the high dose yet, which was sent by her previous provider.   I will send the low dose prescription for another 60 days and patient is asked to make an appointment for follow up for further refills.     Thank you,    Dr. Trevor MD

## 2023-12-17 ENCOUNTER — PATIENT MESSAGE (OUTPATIENT)
Dept: MEDICAL GROUP | Facility: MEDICAL CENTER | Age: 63
End: 2023-12-17
Payer: COMMERCIAL

## 2024-02-20 DIAGNOSIS — F41.1 GENERALIZED ANXIETY DISORDER: ICD-10-CM

## 2024-02-20 RX ORDER — ALPRAZOLAM 0.5 MG/1
TABLET ORAL
Qty: 15 TABLET | Refills: 0 | Status: SHIPPED | OUTPATIENT
Start: 2024-02-20 | End: 2024-03-21

## 2024-02-20 NOTE — TELEPHONE ENCOUNTER
Received request via: Pharmacy    Was the patient seen in the last year in this department? Yes    Does the patient have an active prescription (recently filled or refills available) for medication(s) requested? No      Does the patient have [facility] Plus and need 100 day supply (blood pressure, diabetes and cholesterol meds only)? Patient does not have SCP

## 2024-02-20 NOTE — TELEPHONE ENCOUNTER
This was not discussed last visit in detail.   I would like to discuss it during next visit and go over controlled substance contract. I am sending only 1 month supply.     Thank you,    Dr. Trevor MD

## 2024-02-22 DIAGNOSIS — E89.0 POSTOPERATIVE HYPOTHYROIDISM: ICD-10-CM

## 2024-02-22 RX ORDER — LEVOTHYROXINE SODIUM 88 UG/1
88 TABLET ORAL
Qty: 30 TABLET | Refills: 2 | Status: SHIPPED
Start: 2024-02-22

## 2024-03-28 ENCOUNTER — OFFICE VISIT (OUTPATIENT)
Dept: MEDICAL GROUP | Facility: MEDICAL CENTER | Age: 64
End: 2024-03-28
Payer: COMMERCIAL

## 2024-03-28 VITALS
DIASTOLIC BLOOD PRESSURE: 68 MMHG | WEIGHT: 136.2 LBS | TEMPERATURE: 97.4 F | HEART RATE: 85 BPM | BODY MASS INDEX: 25.71 KG/M2 | HEIGHT: 61 IN | SYSTOLIC BLOOD PRESSURE: 110 MMHG | OXYGEN SATURATION: 98 %

## 2024-03-28 DIAGNOSIS — M54.12 CERVICAL RADICULOPATHY: ICD-10-CM

## 2024-03-28 DIAGNOSIS — G89.29 CHRONIC BILATERAL LOW BACK PAIN WITHOUT SCIATICA: ICD-10-CM

## 2024-03-28 DIAGNOSIS — M54.12 CERVICAL RADICULOPATHY: Primary | ICD-10-CM

## 2024-03-28 DIAGNOSIS — M54.50 ACUTE BILATERAL LOW BACK PAIN WITHOUT SCIATICA: ICD-10-CM

## 2024-03-28 DIAGNOSIS — E66.3 OVERWEIGHT (BMI 25.0-29.9): ICD-10-CM

## 2024-03-28 DIAGNOSIS — F41.1 GENERALIZED ANXIETY DISORDER: ICD-10-CM

## 2024-03-28 DIAGNOSIS — F34.1 DYSTHYMIA: ICD-10-CM

## 2024-03-28 DIAGNOSIS — M54.50 CHRONIC BILATERAL LOW BACK PAIN WITHOUT SCIATICA: ICD-10-CM

## 2024-03-28 DIAGNOSIS — E89.0 POSTOPERATIVE HYPOTHYROIDISM: ICD-10-CM

## 2024-03-28 PROCEDURE — 3078F DIAST BP <80 MM HG: CPT | Performed by: STUDENT IN AN ORGANIZED HEALTH CARE EDUCATION/TRAINING PROGRAM

## 2024-03-28 PROCEDURE — 99214 OFFICE O/P EST MOD 30 MIN: CPT | Performed by: STUDENT IN AN ORGANIZED HEALTH CARE EDUCATION/TRAINING PROGRAM

## 2024-03-28 PROCEDURE — 3074F SYST BP LT 130 MM HG: CPT | Performed by: STUDENT IN AN ORGANIZED HEALTH CARE EDUCATION/TRAINING PROGRAM

## 2024-03-28 RX ORDER — DULOXETIN HYDROCHLORIDE 30 MG/1
30 CAPSULE, DELAYED RELEASE ORAL DAILY
Qty: 100 CAPSULE | Refills: 1 | Status: SHIPPED | OUTPATIENT
Start: 2024-03-28

## 2024-03-28 RX ORDER — ALPRAZOLAM 0.5 MG/1
.25-.5 TABLET ORAL
Qty: 15 TABLET | Refills: 0 | Status: SHIPPED | OUTPATIENT
Start: 2024-03-28 | End: 2024-04-27

## 2024-03-28 RX ORDER — GABAPENTIN 300 MG/1
300 CAPSULE ORAL NIGHTLY
Qty: 100 CAPSULE | Refills: 2 | Status: SHIPPED | OUTPATIENT
Start: 2024-03-28

## 2024-03-28 ASSESSMENT — FIBROSIS 4 INDEX: FIB4 SCORE: 0.79

## 2024-03-28 ASSESSMENT — PATIENT HEALTH QUESTIONNAIRE - PHQ9: CLINICAL INTERPRETATION OF PHQ2 SCORE: 0

## 2024-03-28 NOTE — PROGRESS NOTES
"Subjective:     CC:   Chief Complaint   Patient presents with    Medication Management    Shoulder Pain     Want to talk about possible tendinitis pain that goes down arm    Other     Trouble sleeping         HPI:   Jordyn presents today     No numbness or tingling - pain startes at right shoulder and achy dragging pain in shoulder down the arm. Sometimes feels like the   Problem   Overweight (Bmi 25.0-29.9)    Patient was prescribed Qsymia for weight loss but reports some anxiety symptoms probably because of the phentermine part.  Recommended to discontinue the medication.          Health Maintenance: Completed    ROS:  ROS    Review of systems unremarkable except for concerns noted by patient or items listed.    Please see HPI for additional ROS.      Objective:     Exam:  /68 (BP Location: Left arm, Patient Position: Sitting, BP Cuff Size: Adult)   Pulse 85   Temp 36.3 °C (97.4 °F) (Temporal)   Ht 1.549 m (5' 1\")   Wt 61.8 kg (136 lb 3.2 oz)   SpO2 98%   BMI 25.73 kg/m²  Body mass index is 25.73 kg/m².    Physical Exam  Constitutional:       Appearance: Normal appearance.   HENT:      Head: Normocephalic.   Eyes:      General: No scleral icterus.  Cardiovascular:      Rate and Rhythm: Normal rate and regular rhythm.      Pulses: Normal pulses.      Heart sounds: Normal heart sounds.   Pulmonary:      Effort: Pulmonary effort is normal.      Breath sounds: Normal breath sounds.   Musculoskeletal:      Right lower leg: No edema.      Left lower leg: No edema.   Skin:     General: Skin is warm.   Neurological:      Mental Status: She is alert and oriented to person, place, and time.   Psychiatric:         Mood and Affect: Mood normal.         Behavior: Behavior normal.             Labs: reviewed     Assessment & Plan:     63 y.o. female with the following -             Referral for genetic research was offered. Patient {declined/accepted}.    I spent a total of *** minutes with record review, exam, " communication with the patient, communication with other providers, and documentation of this encounter.      No follow-ups on file.    Please note that this dictation was created using voice recognition software. I have made every reasonable attempt to correct obvious errors, but I expect that there are errors of grammar and possibly content that I did not discover before finalizing the note.         controlled substance contract.  PDMP reviewed . shows no abnormal prescribing or filling behavior.    The treatment plan was reviewed and discussed with the patient. The pharmacy monitoring report was requested and reviewed.  Patient is appropriate for refill of this medication.  -Patient informed no medication adjustments will be made to titrate up or add additional narcotics, benzodiazepines or other controlled substances to this regimen.  Referral to pain management or behavioral health will be made as appropriate.    - ALPRAZolam (XANAX) 0.5 MG Tab; Take 0.5-1 Tablets by mouth 1 time a day as needed for Anxiety (anxiety) for up to 30 days. Indications: Feeling Anxious  Dispense: 15 Tablet; Refill: 0      - ALPRAZolam (XANAX) 0.5 MG Tab; Take 0.5-1 Tablets by mouth 1 time a day as needed for Anxiety (anxiety) for up to 30 days. Indications: Feeling Anxious  Dispense: 15 Tablet; Refill: 0    5. Postoperative hypothyroidism  Chronic, stable  Currently on Synthroid 88 mcg daily  Plan  Continue current medication  Recheck thyroid function test  - TSH+FREE T4        Return in about 6 months (around 9/28/2024), or if symptoms worsen or fail to improve.    Please note that this dictation was created using voice recognition software. I have made every reasonable attempt to correct obvious errors, but I expect that there are errors of grammar and possibly content that I did not discover before finalizing the note.

## 2024-04-01 ENCOUNTER — APPOINTMENT (OUTPATIENT)
Dept: RADIOLOGY | Facility: MEDICAL CENTER | Age: 64
End: 2024-04-01
Attending: STUDENT IN AN ORGANIZED HEALTH CARE EDUCATION/TRAINING PROGRAM
Payer: COMMERCIAL

## 2024-04-01 DIAGNOSIS — M54.12 CERVICAL RADICULOPATHY: ICD-10-CM

## 2024-04-01 PROCEDURE — 72040 X-RAY EXAM NECK SPINE 2-3 VW: CPT

## 2024-04-09 ENCOUNTER — HOSPITAL ENCOUNTER (OUTPATIENT)
Dept: LAB | Facility: MEDICAL CENTER | Age: 64
End: 2024-04-09
Attending: STUDENT IN AN ORGANIZED HEALTH CARE EDUCATION/TRAINING PROGRAM
Payer: COMMERCIAL

## 2024-04-09 DIAGNOSIS — K83.8 COMMON BILE DUCT DILATION: ICD-10-CM

## 2024-04-09 LAB
GGT SERPL-CCNC: 13 U/L (ref 7–34)
LIPASE SERPL-CCNC: 30 U/L (ref 11–82)
T4 FREE SERPL-MCNC: 1.42 NG/DL (ref 0.93–1.7)
TSH SERPL DL<=0.005 MIU/L-ACNC: 13.2 UIU/ML (ref 0.38–5.33)

## 2024-04-09 PROCEDURE — 83690 ASSAY OF LIPASE: CPT

## 2024-04-09 PROCEDURE — 84439 ASSAY OF FREE THYROXINE: CPT

## 2024-04-09 PROCEDURE — 82977 ASSAY OF GGT: CPT

## 2024-04-09 PROCEDURE — 84443 ASSAY THYROID STIM HORMONE: CPT

## 2024-04-09 PROCEDURE — 36415 COLL VENOUS BLD VENIPUNCTURE: CPT

## 2024-04-15 ENCOUNTER — APPOINTMENT (RX ONLY)
Dept: URBAN - METROPOLITAN AREA CLINIC 36 | Facility: CLINIC | Age: 64
Setting detail: DERMATOLOGY
End: 2024-04-15

## 2024-04-15 ENCOUNTER — PATIENT MESSAGE (OUTPATIENT)
Dept: MEDICAL GROUP | Facility: MEDICAL CENTER | Age: 64
End: 2024-04-15
Payer: COMMERCIAL

## 2024-04-15 DIAGNOSIS — E89.0 POSTOPERATIVE HYPOTHYROIDISM: ICD-10-CM

## 2024-04-15 DIAGNOSIS — Z41.9 ENCOUNTER FOR PROCEDURE FOR PURPOSES OTHER THAN REMEDYING HEALTH STATE, UNSPECIFIED: ICD-10-CM

## 2024-04-15 PROCEDURE — ? DIAMONDGLOW

## 2024-04-15 RX ORDER — LEVOTHYROXINE SODIUM 0.1 MG/1
100 TABLET ORAL
Qty: 90 TABLET | Refills: 0 | Status: SHIPPED | OUTPATIENT
Start: 2024-04-15

## 2024-04-15 ASSESSMENT — LOCATION DETAILED DESCRIPTION DERM
LOCATION DETAILED: LEFT FOREHEAD
LOCATION DETAILED: RIGHT LATERAL FOREHEAD
LOCATION DETAILED: RIGHT INFERIOR CENTRAL MALAR CHEEK
LOCATION DETAILED: LEFT CENTRAL MALAR CHEEK
LOCATION DETAILED: RIGHT LOWER CUTANEOUS LIP
LOCATION DETAILED: INFERIOR MID FOREHEAD

## 2024-04-15 ASSESSMENT — LOCATION SIMPLE DESCRIPTION DERM
LOCATION SIMPLE: RIGHT LIP
LOCATION SIMPLE: LEFT FOREHEAD
LOCATION SIMPLE: INFERIOR FOREHEAD
LOCATION SIMPLE: LEFT CHEEK
LOCATION SIMPLE: RIGHT CHEEK
LOCATION SIMPLE: RIGHT FOREHEAD

## 2024-04-15 ASSESSMENT — LOCATION ZONE DERM
LOCATION ZONE: FACE
LOCATION ZONE: LIP

## 2024-04-15 NOTE — PROCEDURE: DIAMONDGLOW
Facial Treatment Head Used?: Not Used
Additional Solution Used: TNS Advanced
Number Of Passes: 4
Consent: Written consent obtained, risks reviewed including but not limited to crusting, scabbing, blistering, scarring, darker or lighter pigmentary change, bruising, and/or incomplete response.
Detail Level: Zone
Treatment Number (Optional): 1
Post-Care Instructions: I reviewed with the patient in detail post-care instructions. Patient should stay away from the sun and wear sun protection until treated areas are fully healed.
Intelliflow Setting: 100%
Price (Use Numbers Only, No Special Characters Or $): 447
Vacuum Pressure In Inches: 10

## 2024-04-16 ENCOUNTER — PATIENT MESSAGE (OUTPATIENT)
Dept: MEDICAL GROUP | Facility: MEDICAL CENTER | Age: 64
End: 2024-04-16
Payer: COMMERCIAL

## 2024-04-24 ENCOUNTER — PATIENT MESSAGE (OUTPATIENT)
Dept: MEDICAL GROUP | Facility: MEDICAL CENTER | Age: 64
End: 2024-04-24
Payer: COMMERCIAL

## 2024-04-24 DIAGNOSIS — F51.04 CHRONIC INSOMNIA: ICD-10-CM

## 2024-04-26 ENCOUNTER — PATIENT MESSAGE (OUTPATIENT)
Dept: MEDICAL GROUP | Facility: MEDICAL CENTER | Age: 64
End: 2024-04-26
Payer: COMMERCIAL

## 2024-04-26 DIAGNOSIS — G47.00 INSOMNIA, UNSPECIFIED TYPE: ICD-10-CM

## 2024-04-26 RX ORDER — TRAZODONE HYDROCHLORIDE 50 MG/1
25-50 TABLET ORAL NIGHTLY
Qty: 30 TABLET | Refills: 1 | Status: SHIPPED | OUTPATIENT
Start: 2024-04-26

## 2024-04-30 ENCOUNTER — HOSPITAL ENCOUNTER (OUTPATIENT)
Facility: MEDICAL CENTER | Age: 64
End: 2024-04-30
Attending: PHYSICIAN ASSISTANT
Payer: COMMERCIAL

## 2024-04-30 LAB — AMBIGUOUS DTTM AMBI4: NORMAL

## 2024-05-02 LAB
AMPHET CTO UR CFM-MCNC: NEGATIVE NG/ML
BARBITURATES CTO UR CFM-MCNC: NEGATIVE NG/ML
BENZODIAZ CTO UR CFM-MCNC: NORMAL NG/ML
BUPRENORPHINE UR-MCNC: NEGATIVE NG/ML
CANNABINOIDS CTO UR CFM-MCNC: NEGATIVE NG/ML
CARISOPRODOL UR-MCNC: NEGATIVE NG/ML
COCAINE CTO UR CFM-MCNC: NEGATIVE NG/ML
CREAT UR-MCNC: 85 MG/DL (ref 20–400)
DRUG SCREEN COMMENT UR-IMP: NORMAL
ETHYL GLUCURONIDE UR QL SCN: NEGATIVE NG/ML
FENTANYL UR-MCNC: NEGATIVE NG/ML
MEPERIDINE CTO UR SCN-MCNC: NEGATIVE NG/ML
METHADONE CTO UR CFM-MCNC: NEGATIVE NG/ML
OPIATES UR QL SCN: NEGATIVE NG/ML
OXYCDOXYM URSCRN 2005102: NEGATIVE NG/ML
PCP CTO UR CFM-MCNC: NEGATIVE NG/ML
PROPOXYPH CTO UR CFM-MCNC: NEGATIVE NG/ML
TAPENTADOL UR-MCNC: NEGATIVE NG/ML
TRAMADOL CTO UR SCN-MCNC: NEGATIVE NG/ML
ZOLPIDEM UR-MCNC: NEGATIVE NG/ML

## 2024-05-04 LAB
1OH-MIDAZOLAM UR CFM-MCNC: <20 NG/ML
7AMINOCLONAZEPAM UR CFM-MCNC: <5 NG/ML
A-OH ALPRAZ UR CFM-MCNC: 109 NG/ML
ALPRAZ UR CFM-MCNC: 25 NG/ML
CHLORDIAZEP UR CFM-MCNC: <20 NG/ML
CLONAZEPAM UR CFM-MCNC: <5 NG/ML
DIAZEPAM UR CFM-MCNC: <20 NG/ML
LORAZEPAM UR CFM-MCNC: <20 NG/ML
MIDAZOLAM UR CFM-MCNC: <20 NG/ML
NORDIAZEPAM UR CFM-MCNC: <20 NG/ML
OXAZEPAM UR CFM-MCNC: <20 NG/ML
TEMAZEPAM UR CFM-MCNC: <20 NG/ML

## 2024-05-20 DIAGNOSIS — G47.00 INSOMNIA, UNSPECIFIED TYPE: ICD-10-CM

## 2024-05-20 RX ORDER — TRAZODONE HYDROCHLORIDE 50 MG/1
25-50 TABLET ORAL NIGHTLY
Qty: 90 TABLET | Refills: 1 | Status: SHIPPED | OUTPATIENT
Start: 2024-05-20

## 2024-06-26 ENCOUNTER — OFFICE VISIT (OUTPATIENT)
Dept: MEDICAL GROUP | Facility: MEDICAL CENTER | Age: 64
End: 2024-06-26
Payer: COMMERCIAL

## 2024-06-26 VITALS
TEMPERATURE: 97.5 F | HEART RATE: 88 BPM | SYSTOLIC BLOOD PRESSURE: 112 MMHG | BODY MASS INDEX: 25.68 KG/M2 | HEIGHT: 61 IN | OXYGEN SATURATION: 96 % | WEIGHT: 136 LBS | DIASTOLIC BLOOD PRESSURE: 68 MMHG

## 2024-06-26 DIAGNOSIS — R25.2 CRAMPS, EXTREMITY: ICD-10-CM

## 2024-06-26 DIAGNOSIS — F41.1 GENERALIZED ANXIETY DISORDER: ICD-10-CM

## 2024-06-26 DIAGNOSIS — K21.9 GASTROESOPHAGEAL REFLUX DISEASE, UNSPECIFIED WHETHER ESOPHAGITIS PRESENT: ICD-10-CM

## 2024-06-26 DIAGNOSIS — R07.9 CHEST PAIN, UNSPECIFIED TYPE: ICD-10-CM

## 2024-06-26 PROCEDURE — 99214 OFFICE O/P EST MOD 30 MIN: CPT | Performed by: PHYSICIAN ASSISTANT

## 2024-06-26 PROCEDURE — 3074F SYST BP LT 130 MM HG: CPT | Performed by: PHYSICIAN ASSISTANT

## 2024-06-26 PROCEDURE — 3078F DIAST BP <80 MM HG: CPT | Performed by: PHYSICIAN ASSISTANT

## 2024-06-26 RX ORDER — ALPRAZOLAM 0.5 MG/1
.25-.5 TABLET ORAL
Qty: 15 TABLET | Refills: 0 | Status: SHIPPED | OUTPATIENT
Start: 2024-06-26 | End: 2024-07-26

## 2024-06-26 ASSESSMENT — FIBROSIS 4 INDEX: FIB4 SCORE: 0.8

## 2024-06-26 NOTE — PROGRESS NOTES
"Chief Complaint   Patient presents with    Leg Problem     Leg cramps on both legs, mostly on the right, started about 1 month ago.    Chest Pain     Couple weeks in the middle of the nigh and had some jaw pain. Pain lasted about 10 minutes        Chest Pain       Jordyn Vidal is a 64 y.o. female here today for  History of Present Illness  The patient is a 64-year-old who presents for evaluation of multiple medical concerns.    The patient reports experiencing leg cramps, predominantly in her toes, and hands, persisting for nearly a month. These cramps occur 3 to 5 times daily, occurring 2 to 3 times daily. The cramps do not disrupt her sleep. Additionally, her toes occasionally exhibit curling.    The patient has experienced 7 to 10 episodes of chest pain in the past decade, with the most recent episode occurring 2 weeks ago that disrupted her sleep. She describes the pain as a crushing sensation localized over mid-chest. Radiating to her jaw. denies any history of heartburn, GERD, acid reflux, or myocardial infarction. She has not previously consulted a cardiologist or undergone any cardiac work-up.      Pt uses xanax prn for sleep and anxiety,     Supplemental Information  She takes magnesium 2 to 3 times a week to help her sleep. She had severe pain in her elbow. She had some injections done. She was prescribed methocarbamol 750. She takes either half or she takes a whole tablet.   She does not smoke.   She denies any family history of cardiac problems.               Exam:  /68 (BP Location: Left arm, Patient Position: Sitting, BP Cuff Size: Adult)   Pulse 88   Temp 36.4 °C (97.5 °F) (Temporal)   Ht 1.549 m (5' 1\")   Wt 61.7 kg (136 lb)   SpO2 96%       Constitutional: Alert, oriented in no acute distress.  Psych: Eye contact is good, speech goal directed, affect calm  Eyes: Conjunctiva non-injected, sclera non-icteric.  Lungs: Unlabored respiratory effort, clear to auscultation bilaterally with good " excursion, no wheez or rhonci  CV: regular rate and rhythm. No lower extremity edema        A/P:    1. Chest pain, unspecified type  Declines NM stress test or cardiology referral    Discussed could be due to GERD however patient denies having acid reflux, only uses famotidine occasionally as well that      2. Generalized anxiety disorder    - ALPRAZolam (XANAX) 0.5 MG Tab; Take 0.5-1 Tablets by mouth 1 time a day as needed for Anxiety (anxiety) for up to 30 days. Indications: Feeling Anxious  Dispense: 15 Tablet; Refill: 0    3. Gastroesophageal reflux disease, unspecified whether esophagitis present      4. Cramps, extremity  Patient was recently prescribed methocarbamol that she occasionally uses, advised to use methocarbamol nightly for 7 to 10 days, magnesium daily for 7 to 10 days and quinine,  If not improving we can try baclofen  Consider nerve conduction study        F/U: prn

## 2024-07-05 ENCOUNTER — APPOINTMENT (OUTPATIENT)
Dept: RADIOLOGY | Facility: MEDICAL CENTER | Age: 64
End: 2024-07-05
Attending: PHYSICIAN ASSISTANT
Payer: COMMERCIAL

## 2024-07-29 DIAGNOSIS — E89.0 POSTOPERATIVE HYPOTHYROIDISM: ICD-10-CM

## 2024-07-30 RX ORDER — LEVOTHYROXINE SODIUM 0.1 MG/1
100 TABLET ORAL
Qty: 90 TABLET | Refills: 0 | Status: SHIPPED | OUTPATIENT
Start: 2024-07-30

## 2024-08-02 ENCOUNTER — PATIENT MESSAGE (OUTPATIENT)
Dept: MEDICAL GROUP | Facility: MEDICAL CENTER | Age: 64
End: 2024-08-02
Payer: COMMERCIAL

## 2024-08-02 DIAGNOSIS — R25.2 CRAMPS, EXTREMITY: ICD-10-CM

## 2024-08-02 RX ORDER — BACLOFEN 5 MG/1
10 TABLET ORAL NIGHTLY PRN
Qty: 20 TABLET | Refills: 0 | Status: SHIPPED | OUTPATIENT
Start: 2024-08-02

## 2024-08-03 NOTE — PATIENT COMMUNICATION
2. SPECIFIC Action To Be Taken: Orders pending, please sign.    3. Diagnosis/Clinical Reason for Request:     4. Specialty & Provider Name/Lab/Imaging Location: Neurology    5. Is appointment scheduled for requested order/referral: no

## 2024-08-15 ENCOUNTER — TELEPHONE (OUTPATIENT)
Dept: HEALTH INFORMATION MANAGEMENT | Facility: OTHER | Age: 64
End: 2024-08-15
Payer: COMMERCIAL

## 2024-09-04 ENCOUNTER — APPOINTMENT (OUTPATIENT)
Dept: LAB | Facility: MEDICAL CENTER | Age: 64
End: 2024-09-04
Payer: COMMERCIAL

## 2024-09-05 ENCOUNTER — HOSPITAL ENCOUNTER (OUTPATIENT)
Dept: LAB | Facility: MEDICAL CENTER | Age: 64
End: 2024-09-05
Attending: STUDENT IN AN ORGANIZED HEALTH CARE EDUCATION/TRAINING PROGRAM
Payer: COMMERCIAL

## 2024-09-05 DIAGNOSIS — Z00.00 WELLNESS EXAMINATION: ICD-10-CM

## 2024-09-05 LAB
ERYTHROCYTE [DISTWIDTH] IN BLOOD BY AUTOMATED COUNT: 44.3 FL (ref 35.9–50)
EST. AVERAGE GLUCOSE BLD GHB EST-MCNC: 117 MG/DL
HBA1C MFR BLD: 5.7 % (ref 4–5.6)
HCT VFR BLD AUTO: 40 % (ref 37–47)
HGB BLD-MCNC: 12.7 G/DL (ref 12–16)
MCH RBC QN AUTO: 30 PG (ref 27–33)
MCHC RBC AUTO-ENTMCNC: 31.8 G/DL (ref 32.2–35.5)
MCV RBC AUTO: 94.6 FL (ref 81.4–97.8)
PLATELET # BLD AUTO: 305 K/UL (ref 164–446)
PMV BLD AUTO: 11.3 FL (ref 9–12.9)
RBC # BLD AUTO: 4.23 M/UL (ref 4.2–5.4)
WBC # BLD AUTO: 5.4 K/UL (ref 4.8–10.8)

## 2024-09-05 PROCEDURE — 80053 COMPREHEN METABOLIC PANEL: CPT

## 2024-09-05 PROCEDURE — 80061 LIPID PANEL: CPT

## 2024-09-05 PROCEDURE — 84439 ASSAY OF FREE THYROXINE: CPT

## 2024-09-05 PROCEDURE — 84443 ASSAY THYROID STIM HORMONE: CPT

## 2024-09-05 PROCEDURE — 85027 COMPLETE CBC AUTOMATED: CPT

## 2024-09-05 PROCEDURE — 36415 COLL VENOUS BLD VENIPUNCTURE: CPT

## 2024-09-05 PROCEDURE — 83036 HEMOGLOBIN GLYCOSYLATED A1C: CPT

## 2024-09-06 ENCOUNTER — OFFICE VISIT (OUTPATIENT)
Dept: MEDICAL GROUP | Facility: MEDICAL CENTER | Age: 64
End: 2024-09-06
Payer: COMMERCIAL

## 2024-09-06 VITALS
TEMPERATURE: 97.8 F | SYSTOLIC BLOOD PRESSURE: 116 MMHG | BODY MASS INDEX: 26.06 KG/M2 | DIASTOLIC BLOOD PRESSURE: 62 MMHG | OXYGEN SATURATION: 96 % | HEART RATE: 106 BPM | RESPIRATION RATE: 16 BRPM | WEIGHT: 138 LBS | HEIGHT: 61 IN

## 2024-09-06 DIAGNOSIS — R73.03 PREDIABETES: ICD-10-CM

## 2024-09-06 DIAGNOSIS — F41.1 GENERALIZED ANXIETY DISORDER: ICD-10-CM

## 2024-09-06 DIAGNOSIS — L80 VITILIGO: ICD-10-CM

## 2024-09-06 DIAGNOSIS — G47.00 INSOMNIA, UNSPECIFIED TYPE: ICD-10-CM

## 2024-09-06 DIAGNOSIS — E89.0 POSTOPERATIVE HYPOTHYROIDISM: ICD-10-CM

## 2024-09-06 LAB
ALBUMIN SERPL BCP-MCNC: 4.3 G/DL (ref 3.2–4.9)
ALBUMIN/GLOB SERPL: 1.5 G/DL
ALP SERPL-CCNC: 88 U/L (ref 30–99)
ALT SERPL-CCNC: 18 U/L (ref 2–50)
ANION GAP SERPL CALC-SCNC: 10 MMOL/L (ref 7–16)
AST SERPL-CCNC: 13 U/L (ref 12–45)
BILIRUB SERPL-MCNC: 0.4 MG/DL (ref 0.1–1.5)
BUN SERPL-MCNC: 17 MG/DL (ref 8–22)
CALCIUM ALBUM COR SERPL-MCNC: 9 MG/DL (ref 8.5–10.5)
CALCIUM SERPL-MCNC: 9.2 MG/DL (ref 8.5–10.5)
CHLORIDE SERPL-SCNC: 104 MMOL/L (ref 96–112)
CHOLEST SERPL-MCNC: 234 MG/DL (ref 100–199)
CO2 SERPL-SCNC: 27 MMOL/L (ref 20–33)
CREAT SERPL-MCNC: 0.53 MG/DL (ref 0.5–1.4)
GFR SERPLBLD CREATININE-BSD FMLA CKD-EPI: 103 ML/MIN/1.73 M 2
GLOBULIN SER CALC-MCNC: 2.8 G/DL (ref 1.9–3.5)
GLUCOSE SERPL-MCNC: 83 MG/DL (ref 65–99)
HDLC SERPL-MCNC: 71 MG/DL
LDLC SERPL CALC-MCNC: 142 MG/DL
POTASSIUM SERPL-SCNC: 4.2 MMOL/L (ref 3.6–5.5)
PROT SERPL-MCNC: 7.1 G/DL (ref 6–8.2)
SODIUM SERPL-SCNC: 141 MMOL/L (ref 135–145)
T4 FREE SERPL-MCNC: 1.35 NG/DL (ref 0.93–1.7)
TRIGL SERPL-MCNC: 105 MG/DL (ref 0–149)
TSH SERPL DL<=0.005 MIU/L-ACNC: 5.9 UIU/ML (ref 0.38–5.33)

## 2024-09-06 PROCEDURE — 99214 OFFICE O/P EST MOD 30 MIN: CPT | Performed by: STUDENT IN AN ORGANIZED HEALTH CARE EDUCATION/TRAINING PROGRAM

## 2024-09-06 PROCEDURE — 3074F SYST BP LT 130 MM HG: CPT | Performed by: STUDENT IN AN ORGANIZED HEALTH CARE EDUCATION/TRAINING PROGRAM

## 2024-09-06 PROCEDURE — 3078F DIAST BP <80 MM HG: CPT | Performed by: STUDENT IN AN ORGANIZED HEALTH CARE EDUCATION/TRAINING PROGRAM

## 2024-09-06 RX ORDER — TRAZODONE HYDROCHLORIDE 50 MG/1
25-50 TABLET, FILM COATED ORAL NIGHTLY
Qty: 90 TABLET | Refills: 1 | Status: SHIPPED | OUTPATIENT
Start: 2024-09-06

## 2024-09-06 ASSESSMENT — FIBROSIS 4 INDEX: FIB4 SCORE: 0.64

## 2024-09-06 NOTE — PROGRESS NOTES
"Subjective:     CC:   Chief Complaint   Patient presents with    Lab Results     Discuss lab results         HPI:   Jordyn presents today with    A1C 5.7   Prediabetic   Diet and exercise managed   Lab Results   Component Value Date/Time    CHOLSTRLTOT 234 (H) 09/05/2024 0827    TRIGLYCERIDE 105 09/05/2024 0827    HDL 71 09/05/2024 0827     (H) 09/05/2024 0827     The 10-year ASCVD risk score (Kalpesh WOOTEN, et al., 2019) is: 4%      TSh 5.9 with normal Free T4 . Dose of synthroid was increased to 100 mcg previously ( from 88 mcg daily) .     Requesting refill for trazodone for sleep       Health Maintenance: Completed    ROS:  ROS    Review of systems unremarkable except for concerns noted by patient or items listed.    Please see HPI for additional ROS.      Objective:     Exam:  /62 (BP Location: Left arm, Patient Position: Sitting, BP Cuff Size: Adult)   Pulse (!) 106   Temp 36.6 °C (97.8 °F) (Temporal)   Resp 16   Ht 1.549 m (5' 1\")   Wt 62.6 kg (138 lb)   SpO2 96%   BMI 26.07 kg/m²  Body mass index is 26.07 kg/m².    Physical Exam  Constitutional:       Appearance: Normal appearance.   HENT:      Head: Normocephalic.   Eyes:      General: No scleral icterus.  Cardiovascular:      Rate and Rhythm: Normal rate and regular rhythm.      Pulses: Normal pulses.      Heart sounds: Normal heart sounds.   Pulmonary:      Effort: Pulmonary effort is normal.      Breath sounds: Normal breath sounds.   Musculoskeletal:      Right lower leg: No edema.      Left lower leg: No edema.   Skin:     General: Skin is warm.      Findings: Rash present.      Comments: Hypopigmented rash- vitiligo   Neurological:      Mental Status: She is alert and oriented to person, place, and time.   Psychiatric:         Mood and Affect: Mood normal.         Behavior: Behavior normal.             Labs: reviewed     Assessment & Plan:     64 y.o. female with the following -         1. Insomnia, unspecified type  Chronic, " stable  Plan:  - traZODone (DESYREL) 50 MG Tab; Take 0.5-1 Tablets by mouth every evening.  Dispense: 90 Tablet; Refill: 1    2. Vitiligo  Chronic , uncontrolled   Plan:  - Referral to Dermatology    3. Postoperative hypothyroidism  Chronic , improved   Current medication: Levothyroxine 100 mcg, taking daily early in am empty stomach   No temperature intolerance. No change in weight,  hair/skin quality, BMs.    No tremors, weakness,No peripheral swelling.  No mood changes.  Last TSH was reviewed.    4. Generalized anxiety disorder  Chronic.stable   Reports she has been using this for many years to manage anxiety.  Current meds:  alprazolam 0.5 mg prn  Controlled substance discussed with client. Client agrees to abide by controlled substance contract.  PDMP reviewed . shows no abnormal prescribing or filling behavior.    The treatment plan was reviewed and discussed with the patient. The pharmacy monitoring report was requested and reviewed.  Patient is appropriate for refill of this medication.  -Patient informed no medication adjustments will be made to titrate up or add additional narcotics, benzodiazepines or other controlled substances to this regimen.  Referral to pain management or behavioral health will be made as appropriate.    5. Prediabetes   A1C 5.7   Continue low carb diet and exercise     6. Overweight   Wants weight loss medication medication   Has tried adipex in past   Wants GLP 1 injectable ones   Discussed risk and benefits   Given her BMI , I discussed risk > benefit.   Patient verbalized understanding but still wants to try   Requesting compounded version due to cost  Prescription for zepbound sent to pharmacy 2.5 mg once a week   Scanned in media file     Return in about 3 months (around 12/6/2024) for medications check.    Please note that this dictation was created using voice recognition software. I have made every reasonable attempt to correct obvious errors, but I expect that there are  errors of grammar and possibly content that I did not discover before finalizing the note.

## 2024-09-10 ENCOUNTER — PATIENT MESSAGE (OUTPATIENT)
Dept: MEDICAL GROUP | Facility: MEDICAL CENTER | Age: 64
End: 2024-09-10
Payer: COMMERCIAL

## 2024-09-10 DIAGNOSIS — F41.1 GENERALIZED ANXIETY DISORDER: ICD-10-CM

## 2024-09-10 DIAGNOSIS — E89.0 POSTOPERATIVE HYPOTHYROIDISM: ICD-10-CM

## 2024-09-10 DIAGNOSIS — R25.2 CRAMPS, EXTREMITY: ICD-10-CM

## 2024-09-10 PROBLEM — G47.00 INSOMNIA: Status: ACTIVE | Noted: 2024-09-10

## 2024-09-10 RX ORDER — ALPRAZOLAM 0.5 MG
.25-.5 TABLET ORAL
Qty: 30 TABLET | Refills: 0 | Status: SHIPPED | OUTPATIENT
Start: 2024-09-10 | End: 2024-12-09

## 2024-09-10 RX ORDER — BACLOFEN 5 MG/1
5 TABLET ORAL NIGHTLY PRN
Qty: 30 TABLET | Refills: 1 | Status: SHIPPED | OUTPATIENT
Start: 2024-09-10

## 2024-09-10 RX ORDER — LEVOTHYROXINE SODIUM 100 UG/1
100 TABLET ORAL
Qty: 90 TABLET | Refills: 2 | Status: SHIPPED | OUTPATIENT
Start: 2024-09-10

## 2024-09-10 NOTE — PATIENT COMMUNICATION
Was the patient seen in the last year in this department? Yes   Does patient have an active prescription for medications requested? No   Received Request Via: Patient  Last seen: 9/6/24  Future patrick: NONE

## 2024-10-07 ENCOUNTER — OFFICE VISIT (OUTPATIENT)
Dept: DERMATOLOGY | Facility: IMAGING CENTER | Age: 64
End: 2024-10-07
Payer: COMMERCIAL

## 2024-10-07 ENCOUNTER — PATIENT MESSAGE (OUTPATIENT)
Dept: MEDICAL GROUP | Facility: MEDICAL CENTER | Age: 64
End: 2024-10-07

## 2024-10-07 DIAGNOSIS — L80 VITILIGO: ICD-10-CM

## 2024-10-07 PROCEDURE — 99213 OFFICE O/P EST LOW 20 MIN: CPT | Performed by: NURSE PRACTITIONER

## 2024-10-09 ENCOUNTER — PATIENT MESSAGE (OUTPATIENT)
Dept: MEDICAL GROUP | Facility: MEDICAL CENTER | Age: 64
End: 2024-10-09
Payer: COMMERCIAL

## 2024-10-09 DIAGNOSIS — K21.9 GASTROESOPHAGEAL REFLUX DISEASE, UNSPECIFIED WHETHER ESOPHAGITIS PRESENT: ICD-10-CM

## 2024-10-10 ENCOUNTER — PATIENT MESSAGE (OUTPATIENT)
Dept: HEALTH INFORMATION MANAGEMENT | Facility: OTHER | Age: 64
End: 2024-10-10

## 2024-10-14 DIAGNOSIS — L80 VITILIGO: ICD-10-CM

## 2024-10-14 RX ORDER — TACROLIMUS 1 MG/G
OINTMENT TOPICAL
Qty: 30 G | Refills: 3 | Status: SHIPPED | OUTPATIENT
Start: 2024-10-14

## 2024-10-30 ENCOUNTER — OFFICE VISIT (OUTPATIENT)
Dept: MEDICAL GROUP | Age: 64
End: 2024-10-30
Payer: COMMERCIAL

## 2024-10-30 VITALS
HEIGHT: 61 IN | SYSTOLIC BLOOD PRESSURE: 94 MMHG | OXYGEN SATURATION: 97 % | BODY MASS INDEX: 25.68 KG/M2 | TEMPERATURE: 97.3 F | DIASTOLIC BLOOD PRESSURE: 60 MMHG | WEIGHT: 136 LBS | HEART RATE: 86 BPM

## 2024-10-30 DIAGNOSIS — K21.9 GASTROESOPHAGEAL REFLUX DISEASE WITHOUT ESOPHAGITIS: ICD-10-CM

## 2024-10-30 DIAGNOSIS — R19.7 DIARRHEA, UNSPECIFIED TYPE: ICD-10-CM

## 2024-10-30 ASSESSMENT — ENCOUNTER SYMPTOMS
NAUSEA: 0
DIZZINESS: 0
WEAKNESS: 0
FEVER: 0
SHORTNESS OF BREATH: 0
DOUBLE VISION: 0
NECK PAIN: 0
BRUISES/BLEEDS EASILY: 0
DEPRESSION: 0
VOMITING: 1
CHILLS: 0
BLOOD IN STOOL: 0
MYALGIAS: 0
BLURRED VISION: 0
COUGH: 0
PALPITATIONS: 0
DIARRHEA: 1
ABDOMINAL PAIN: 0
HEADACHES: 0

## 2024-10-30 ASSESSMENT — FIBROSIS 4 INDEX: FIB4 SCORE: 0.64

## 2024-10-31 ENCOUNTER — HOSPITAL ENCOUNTER (OUTPATIENT)
Facility: MEDICAL CENTER | Age: 64
End: 2024-10-31
Attending: STUDENT IN AN ORGANIZED HEALTH CARE EDUCATION/TRAINING PROGRAM
Payer: COMMERCIAL

## 2024-10-31 DIAGNOSIS — R19.7 DIARRHEA, UNSPECIFIED TYPE: ICD-10-CM

## 2024-10-31 LAB
C PARVUM AG STL QL IA.RAPID: NEGATIVE
G LAMBLIA AG STL QL IA.RAPID: NEGATIVE

## 2024-10-31 PROCEDURE — 87329 GIARDIA AG IA: CPT

## 2024-10-31 PROCEDURE — 87328 CRYPTOSPORIDIUM AG IA: CPT

## 2024-11-21 ENCOUNTER — PATIENT MESSAGE (OUTPATIENT)
Dept: MEDICAL GROUP | Facility: MEDICAL CENTER | Age: 64
End: 2024-11-21
Payer: COMMERCIAL

## 2024-12-02 ENCOUNTER — PATIENT MESSAGE (OUTPATIENT)
Dept: HEALTH INFORMATION MANAGEMENT | Facility: OTHER | Age: 64
End: 2024-12-02

## 2024-12-23 ENCOUNTER — TELEPHONE (OUTPATIENT)
Dept: MEDICAL GROUP | Age: 64
End: 2024-12-23

## 2024-12-23 ENCOUNTER — OFFICE VISIT (OUTPATIENT)
Dept: MEDICAL GROUP | Age: 64
End: 2024-12-23
Payer: COMMERCIAL

## 2024-12-23 VITALS
DIASTOLIC BLOOD PRESSURE: 60 MMHG | BODY MASS INDEX: 25.68 KG/M2 | HEIGHT: 61 IN | TEMPERATURE: 99.2 F | SYSTOLIC BLOOD PRESSURE: 104 MMHG | WEIGHT: 136 LBS | OXYGEN SATURATION: 99 % | HEART RATE: 92 BPM

## 2024-12-23 DIAGNOSIS — J06.9 ACUTE URI: ICD-10-CM

## 2024-12-23 DIAGNOSIS — J06.9 UPPER RESPIRATORY TRACT INFECTION, UNSPECIFIED TYPE: ICD-10-CM

## 2024-12-23 DIAGNOSIS — E89.0 POSTOPERATIVE HYPOTHYROIDISM: ICD-10-CM

## 2024-12-23 LAB
FLUAV RNA SPEC QL NAA+PROBE: NEGATIVE
FLUBV RNA SPEC QL NAA+PROBE: NEGATIVE
RSV RNA SPEC QL NAA+PROBE: NEGATIVE
SARS-COV-2 RNA RESP QL NAA+PROBE: NEGATIVE

## 2024-12-23 PROCEDURE — 3078F DIAST BP <80 MM HG: CPT | Performed by: STUDENT IN AN ORGANIZED HEALTH CARE EDUCATION/TRAINING PROGRAM

## 2024-12-23 PROCEDURE — 0241U POCT CEPHEID COV-2, FLU A/B, RSV - PCR: CPT | Performed by: STUDENT IN AN ORGANIZED HEALTH CARE EDUCATION/TRAINING PROGRAM

## 2024-12-23 PROCEDURE — 3074F SYST BP LT 130 MM HG: CPT | Performed by: STUDENT IN AN ORGANIZED HEALTH CARE EDUCATION/TRAINING PROGRAM

## 2024-12-23 PROCEDURE — 99214 OFFICE O/P EST MOD 30 MIN: CPT | Performed by: STUDENT IN AN ORGANIZED HEALTH CARE EDUCATION/TRAINING PROGRAM

## 2024-12-23 ASSESSMENT — FIBROSIS 4 INDEX: FIB4 SCORE: 0.64

## 2024-12-23 NOTE — PROGRESS NOTES
Chief Complaint   Patient presents with    Cough     Pt has a cough, runny nose. She states she is fatigued, headache.   Pt is also requesting a B12 shot.         History of Present Illness  The patient is a 64-year-old female presenting for cold-like symptoms.    She reports the onset of severe cold symptoms since Friday evening, which have progressively worsened. She experiences shortness of breath, a burning sensation due to excessive coughing, and a persistent runny nose. She has been experiencing these symptoms for the past 3 days. She does not report any fever or chills but mentions feeling cold. She also reports ear pain and headache, for which she took Excedrin this morning. She has not been in contact with any sick individuals at home but suspects exposure during a recent trip. A home COVID-19 test was negative. Despite attempts to alleviate her symptoms with Gayle-Barre, Sudafed, NyQuil, and cough drops, there has been no improvement.    Additionally, she requests a B12 injection, citing a lack of energy and mobility. She believes that previous B12 injections have improved her condition.    MEDICATIONS  Current: Gayle-Barre, Sudafed, NyQuil, Excedrin        ROS:  No fever, cough, nausea, changes in bowel movements or skin rash.      I reviewed the patient's medications, allergies and medical history:  Current Outpatient Medications   Medication Sig Dispense Refill    omeprazole (PRILOSEC) 20 MG delayed-release capsule Take 1 Capsule by mouth every day. 30 Capsule 2    Ruxolitinib Phosphate 1.5 % Cream AAA, thin layer twice a day 60 g 3    levothyroxine (SYNTHROID) 100 MCG Tab Take 1 Tablet by mouth every morning on an empty stomach. 90 Tablet 2    baclofen (LIORESAL) 5 MG Tab Take 1 Tablet by mouth at bedtime as needed (muscle cramps). 30 Tablet 1    diphenhydrAMINE (BENADRYL) 25 MG Tab Take 1 Tablet by mouth every evening.      tacrolimus (PROTOPIC) 0.1 % Ointment AAA twice a day (Patient not taking:  "Reported on 12/23/2024) 30 g 3     No current facility-administered medications for this visit.     Codeine, Zoloft, and Codeine  Past Medical History:   Diagnosis Date    Allergy     Anesthesia     PONV    Anxiety     Cancer (HCC) 10/2013    thyroid    Indigestion     Unspecified disorder of thyroid 2013    THYROIDECTOMY        EXAM:  /60 (BP Location: Left arm, Patient Position: Sitting, BP Cuff Size: Adult)   Pulse 92   Temp 37.3 °C (99.2 °F) (Temporal)   Ht 1.549 m (5' 1\")   Wt 61.7 kg (136 lb)   SpO2 99%   General: Alert, no conversational dyspnea or audible wheeze, non-toxic appearance.  Eyes: PERRL, conjunctiva is normal, no eye discharge.  Ears: Normal pinnae,TM's normal bilaterally.  Nares: Patent with thin mucus.  Sinuses: non tender over maxillary / frontal sinuses.  Throat: Mildly erythematous injection without exudate.   Neck: Supple, with no adenopathy.  Lungs: Clear to auscultation bilaterally, no wheeze, crackles or rhonchi.   Heart: Regular rate without murmur.  Skin: Warm and dry without rash.     ASSESSMENT:     1. Upper respiratory tract infection, unspecified type  2. Acute URI  She has been experiencing symptoms for the past 3 days, including shortness of breath, cough, and runny nose. She has tried Gayle-Camak, Sudafed, NyQuil, and cough drops without improvement. A home COVID-19 test was negative. Swab tests for COVID-19, influenza, and RSV will be conducted. She is advised to continue her current symptomatic treatments and to wear a mask as she may be contagious for up to 5 days. If the influenza test is positive, appropriate medication will be prescribed.     - POCT CoV-2, Flu A/B, RSV by PCR     PLAN:  1. Educated patient that majority of upper respiratory infections are viral and do not need antibiotics  2. Twice daily use of nasal saline rinse or Neti-Pot.  3. OTC anti-pyretics and decongestants as needed.  4. Follow-up in office or urgent care for worsening symptoms, " difficulty breathing, lack of expected recovery, or should new symptoms or problems arise.      3. Postoperative hypothyroidism  -Chronic, stable  -Currently taking levothyroxine 100 mcg daily  -Most recent thyroid function test in early September showed borderline elevated TSH, but normal FT4  -Continue same therapy  -Repeat lab work within 6 months  -Follow-up with PCP    Vitamin B12 injection request.  Her previous lab results indicate normal vitamin B12 levels. She is informed that vitamin B12 injections are not necessary given her normal levels and that they do not provide an energy boost. She is advised to discuss this further with her primary care physician.

## 2024-12-24 NOTE — TELEPHONE ENCOUNTER
Phone Number Called: [phone]    Call outcome: Spoke to patient regarding message below.    Message: I called the pt to let her know her results of her swab. The pt was swabbed for Flu A, Flu B, Covid19, and RSV. The pt was notified she was negative for them all.    PAST MEDICAL HISTORY:  Gastritis

## 2025-01-17 ENCOUNTER — OFFICE VISIT (OUTPATIENT)
Dept: MEDICAL GROUP | Facility: MEDICAL CENTER | Age: 65
End: 2025-01-17
Payer: COMMERCIAL

## 2025-01-17 ENCOUNTER — PATIENT MESSAGE (OUTPATIENT)
Dept: MEDICAL GROUP | Facility: MEDICAL CENTER | Age: 65
End: 2025-01-17

## 2025-01-17 VITALS
DIASTOLIC BLOOD PRESSURE: 64 MMHG | SYSTOLIC BLOOD PRESSURE: 104 MMHG | HEIGHT: 61 IN | TEMPERATURE: 96.6 F | BODY MASS INDEX: 25.54 KG/M2 | OXYGEN SATURATION: 97 % | HEART RATE: 78 BPM | WEIGHT: 135.3 LBS

## 2025-01-17 DIAGNOSIS — R25.2 CRAMPS, EXTREMITY: ICD-10-CM

## 2025-01-17 DIAGNOSIS — F51.01 PRIMARY INSOMNIA: Primary | ICD-10-CM

## 2025-01-17 DIAGNOSIS — L80 VITILIGO: ICD-10-CM

## 2025-01-17 DIAGNOSIS — K21.9 GASTROESOPHAGEAL REFLUX DISEASE, UNSPECIFIED WHETHER ESOPHAGITIS PRESENT: ICD-10-CM

## 2025-01-17 DIAGNOSIS — R05.9 COUGH, UNSPECIFIED TYPE: ICD-10-CM

## 2025-01-17 PROCEDURE — 3078F DIAST BP <80 MM HG: CPT | Performed by: STUDENT IN AN ORGANIZED HEALTH CARE EDUCATION/TRAINING PROGRAM

## 2025-01-17 PROCEDURE — 3074F SYST BP LT 130 MM HG: CPT | Performed by: STUDENT IN AN ORGANIZED HEALTH CARE EDUCATION/TRAINING PROGRAM

## 2025-01-17 PROCEDURE — 99214 OFFICE O/P EST MOD 30 MIN: CPT | Performed by: STUDENT IN AN ORGANIZED HEALTH CARE EDUCATION/TRAINING PROGRAM

## 2025-01-17 RX ORDER — ALPRAZOLAM 0.5 MG
.25-.5 TABLET ORAL NIGHTLY PRN
Qty: 45 TABLET | Refills: 0 | Status: SHIPPED | OUTPATIENT
Start: 2025-01-17 | End: 2025-04-17

## 2025-01-17 RX ORDER — BACLOFEN 10 MG/1
5 TABLET ORAL NIGHTLY PRN
Qty: 45 TABLET | Refills: 2 | Status: SHIPPED | OUTPATIENT
Start: 2025-01-17

## 2025-01-17 ASSESSMENT — PATIENT HEALTH QUESTIONNAIRE - PHQ9
9. THOUGHTS THAT YOU WOULD BE BETTER OFF DEAD, OR OF HURTING YOURSELF: NOT AT ALL
1. LITTLE INTEREST OR PLEASURE IN DOING THINGS: NOT AT ALL
8. MOVING OR SPEAKING SO SLOWLY THAT OTHER PEOPLE COULD HAVE NOTICED. OR THE OPPOSITE, BEING SO FIGETY OR RESTLESS THAT YOU HAVE BEEN MOVING AROUND A LOT MORE THAN USUAL: NOT AT ALL
SUM OF ALL RESPONSES TO PHQ9 QUESTIONS 1 AND 2: 0
6. FEELING BAD ABOUT YOURSELF - OR THAT YOU ARE A FAILURE OR HAVE LET YOURSELF OR YOUR FAMILY DOWN: NOT AL ALL
5. POOR APPETITE OR OVEREATING: NOT AT ALL
3. TROUBLE FALLING OR STAYING ASLEEP OR SLEEPING TOO MUCH: NOT AT ALL
7. TROUBLE CONCENTRATING ON THINGS, SUCH AS READING THE NEWSPAPER OR WATCHING TELEVISION: NOT AT ALL
2. FEELING DOWN, DEPRESSED, IRRITABLE, OR HOPELESS: NOT AT ALL
SUM OF ALL RESPONSES TO PHQ QUESTIONS 1-9: 0
4. FEELING TIRED OR HAVING LITTLE ENERGY: NOT AT ALL

## 2025-01-17 ASSESSMENT — FIBROSIS 4 INDEX: FIB4 SCORE: 0.64

## 2025-01-17 NOTE — PROGRESS NOTES
Subjective:     CC:   Chief Complaint   Patient presents with    Cough     PERSISTENT COUGH FOR ABOUT 2 MONTHS     Medication Refill     ON ALPRAZOLAM      Muscle Pain       HPI:   Jordyn presents today with  Verbal consent was acquired by the patient to use .[employer] ambient listening note generation during this visit Yes   History of Present Illness  The patient presents for evaluation of sleep issues, muscle cramps, persistent cough, and vitiligo.    She has been on a long-term regimen of alprazolam, spanning approximately 2 decades, and is seeking a refill of this medication. She reports experiencing sleep disturbances, often waking up between 1:00 AM and 5:00 AM, resulting in an average of 3 hours of sleep per night. She has previously tried trazodone, but it induced severe nausea. Similarly, Ambien, which she used years ago, also caused nausea. Despite these side effects, she expresses a willingness to retry Ambien. She also reports that her sleep quality has deteriorated, with instances of taking up to 4 pills without achieving satisfactory sleep. She has attempted to manage her sleep issues with Benadryl, muscle relaxers, magnesium, and melatonin, but these have not been effective. She describes her sleep pattern as falling asleep for 1 to 2 hours, followed by wakefulness for 4 to 6 hours. She also reports morning muscle aches, akin to the sensation after running a marathon. She does not experience leg jerking during sleep but acknowledges a history of severe cramps, which have significantly improved with baclofen. She has not consulted a dermatologist in the past 4 years. She has a big white jamil and went for a couple of months for treatments. She was given a cream, but it did not work. She was also treated with laser therapy for a few months. She was advised to order a machine and use it at home if she did not want to pay. She spent almost $ 2000 on this machine.    She has been experiencing a persistent  "cough for the past 2 months, which occasionally leads to gagging. She has a known history of acid reflux and has undergone an endoscopy. She has not been taking omeprazole and has only 2 or 3 doses of her heartburn medication left. She typically finishes dinner 3 to 4 hours before bedtime.    She reports that her insurance did not approve the expensive cream prescribed for her vitiligo, and the alternative cream provided did not yield any improvement. She has not consulted a dermatologist in the past 4 years.    MEDICATIONS  Current: alprazolam, baclofen, omeprazole  Past: trazodone, Ambien         Health Maintenance: Completed    ROS:  ROS    Review of systems unremarkable except for concerns noted by patient or items listed.    Please see HPI for additional ROS.      Objective:     Exam:  /64 (BP Location: Right arm, Patient Position: Sitting, BP Cuff Size: Adult)   Pulse 78   Temp 35.9 °C (96.6 °F) (Temporal)   Ht 1.549 m (5' 1\")   Wt 61.4 kg (135 lb 4.8 oz)   SpO2 97%   BMI 25.56 kg/m²  Body mass index is 25.56 kg/m².    Physical Exam  Constitutional:       Appearance: Normal appearance.   HENT:      Head: Normocephalic.   Eyes:      General: No scleral icterus.  Cardiovascular:      Rate and Rhythm: Normal rate and regular rhythm.      Pulses: Normal pulses.      Heart sounds: Normal heart sounds.   Pulmonary:      Effort: Pulmonary effort is normal.      Breath sounds: Normal breath sounds.   Musculoskeletal:      Right lower leg: No edema.      Left lower leg: No edema.   Skin:     General: Skin is warm.   Neurological:      Mental Status: She is alert and oriented to person, place, and time.   Psychiatric:         Mood and Affect: Mood normal.         Behavior: Behavior normal.             Labs: reviewed     Assessment & Plan:     64 y.o. female with the following -     1. Gastroesophageal reflux disease, unspecified whether esophagitis present  3. Cough, unspecified type  Chronic uncontrolled  - " Persistent cough for 2 months, possibly related to GERD  - Advised to take omeprazole either first thing in the morning on an empty stomach or at night before bed, with at least a 2-hour gap after eating  - Prescription for omeprazole provided  - Instructed to take omeprazole for 2 months to see if it alleviates the cough  - Further evaluation if no improvement  - omeprazole (PRILOSEC) 20 MG delayed-release capsule; Take 1 Capsule by mouth every day.  Dispense: 90 Capsule; Refill: 0    3. Cramps, extremity  Chronic,stable  - Significant improvement with baclofen, reducing cramps by 95%  - Prescription for baclofen to be refilled for night-time muscle cramp management  - baclofen (LIORESAL) 10 MG Tab; Take 0.5 Tablets by mouth at bedtime as needed (muscle cramps).  Dispense: 45 Tablet; Refill: 2    4. Primary insomnia  Chronic, not well managed   I would recommend follow up with sleep medicine specialist and CBT ( therapy for insomnia) .patient wants to wait until July ( medicare ) as currently not able to afford specialist appointment   Plan:  - Informed that controlled substance refills need 3 months follow-up  - Advised against combining multiple CNS depressants (Benadryl, baclofen, Xanax) due to potential side effects  - Informed that baclofen can aid in sleep  - Informed about possible restless leg syndrome  - Option to see a sleep specialist  - Prescription for 45 tablets of alprazolam to last until June 2025  - Referral to a psychiatrist specializing in sleep and benzodiazepine management  - Can message provider if experiencing nausea or other side effects  - Controlled substance agreement to be established  Controlled substance discussed with client. Client agrees to abide by controlled substance contract.  PDMP reviewed . shows no abnormal prescribing or filling behavior.    The treatment plan was reviewed and discussed with the patient. The pharmacy monitoring report was requested and reviewed.  Patient is  appropriate for refill of this medication.  -Patient informed no medication adjustments will be made to titrate up or add additional narcotics, benzodiazepines or other controlled substances to this regimen.  Referral to behavioral health will be made as appropriate.    - Controlled Substance Treatment Agreement  - ALPRAZolam (XANAX) 0.5 MG Tab; Take 0.5-1 Tablets by mouth at bedtime as needed for Sleep for up to 90 days. PRESCRIBING 45 TABLETS FOR DURATION OF 90 DAYS TO BE USED 1 TABLET AS NEEDED AT NIGHT FOR SLEEP  Dispense: 45 Tablet; Refill: 0      5. Vitiligo:  - Using a cream prescribed by dermatologist, requires a few months to show effectiveness  - Advised to continue using the cream twice daily for 3 months  - Follow up with a specialist once Medicare coverage begins          Please note that this dictation was created using voice recognition software. I have made every reasonable attempt to correct obvious errors, but I expect that there are errors of grammar and possibly content that I did not discover before finalizing the note.

## 2025-03-21 ENCOUNTER — PATIENT MESSAGE (OUTPATIENT)
Dept: MEDICAL GROUP | Facility: MEDICAL CENTER | Age: 65
End: 2025-03-21
Payer: COMMERCIAL

## 2025-03-21 DIAGNOSIS — R53.83 FATIGUE, UNSPECIFIED TYPE: ICD-10-CM

## 2025-03-25 ENCOUNTER — HOSPITAL ENCOUNTER (OUTPATIENT)
Dept: LAB | Facility: MEDICAL CENTER | Age: 65
End: 2025-03-25
Attending: STUDENT IN AN ORGANIZED HEALTH CARE EDUCATION/TRAINING PROGRAM
Payer: COMMERCIAL

## 2025-03-25 DIAGNOSIS — R53.83 FATIGUE, UNSPECIFIED TYPE: ICD-10-CM

## 2025-03-25 LAB
25(OH)D3 SERPL-MCNC: 29 NG/ML (ref 30–100)
ALBUMIN SERPL BCP-MCNC: 4.3 G/DL (ref 3.2–4.9)
ALBUMIN/GLOB SERPL: 1.3 G/DL
ALP SERPL-CCNC: 86 U/L (ref 30–99)
ALT SERPL-CCNC: 13 U/L (ref 2–50)
ANION GAP SERPL CALC-SCNC: 11 MMOL/L (ref 7–16)
AST SERPL-CCNC: 18 U/L (ref 12–45)
BILIRUB SERPL-MCNC: 0.4 MG/DL (ref 0.1–1.5)
BUN SERPL-MCNC: 11 MG/DL (ref 8–22)
CALCIUM ALBUM COR SERPL-MCNC: 9.2 MG/DL (ref 8.5–10.5)
CALCIUM SERPL-MCNC: 9.4 MG/DL (ref 8.5–10.5)
CHLORIDE SERPL-SCNC: 104 MMOL/L (ref 96–112)
CO2 SERPL-SCNC: 24 MMOL/L (ref 20–33)
CREAT SERPL-MCNC: 0.69 MG/DL (ref 0.5–1.4)
ERYTHROCYTE [DISTWIDTH] IN BLOOD BY AUTOMATED COUNT: 43.9 FL (ref 35.9–50)
FERRITIN SERPL-MCNC: 125 NG/ML (ref 10–291)
GFR SERPLBLD CREATININE-BSD FMLA CKD-EPI: 96 ML/MIN/1.73 M 2
GLOBULIN SER CALC-MCNC: 3.2 G/DL (ref 1.9–3.5)
GLUCOSE SERPL-MCNC: 87 MG/DL (ref 65–99)
HCT VFR BLD AUTO: 39.5 % (ref 37–47)
HGB BLD-MCNC: 12.6 G/DL (ref 12–16)
IRON SATN MFR SERPL: 18 % (ref 15–55)
IRON SERPL-MCNC: 54 UG/DL (ref 40–170)
MCH RBC QN AUTO: 29.4 PG (ref 27–33)
MCHC RBC AUTO-ENTMCNC: 31.9 G/DL (ref 32.2–35.5)
MCV RBC AUTO: 92.3 FL (ref 81.4–97.8)
PLATELET # BLD AUTO: 259 K/UL (ref 164–446)
PMV BLD AUTO: 10.8 FL (ref 9–12.9)
POTASSIUM SERPL-SCNC: 4.1 MMOL/L (ref 3.6–5.5)
PROT SERPL-MCNC: 7.5 G/DL (ref 6–8.2)
RBC # BLD AUTO: 4.28 M/UL (ref 4.2–5.4)
SODIUM SERPL-SCNC: 139 MMOL/L (ref 135–145)
T4 FREE SERPL-MCNC: 1.52 NG/DL (ref 0.93–1.7)
TIBC SERPL-MCNC: 295 UG/DL (ref 250–450)
TSH SERPL-ACNC: 2.61 UIU/ML (ref 0.35–5.5)
UIBC SERPL-MCNC: 241 UG/DL (ref 110–370)
VIT B12 SERPL-MCNC: 1202 PG/ML (ref 211–911)
WBC # BLD AUTO: 5.3 K/UL (ref 4.8–10.8)

## 2025-03-25 PROCEDURE — 80053 COMPREHEN METABOLIC PANEL: CPT

## 2025-03-25 PROCEDURE — 82306 VITAMIN D 25 HYDROXY: CPT

## 2025-03-25 PROCEDURE — 85027 COMPLETE CBC AUTOMATED: CPT

## 2025-03-25 PROCEDURE — 84439 ASSAY OF FREE THYROXINE: CPT

## 2025-03-25 PROCEDURE — 36415 COLL VENOUS BLD VENIPUNCTURE: CPT

## 2025-03-25 PROCEDURE — 83540 ASSAY OF IRON: CPT

## 2025-03-25 PROCEDURE — 83550 IRON BINDING TEST: CPT

## 2025-03-25 PROCEDURE — 84443 ASSAY THYROID STIM HORMONE: CPT

## 2025-03-25 PROCEDURE — 82728 ASSAY OF FERRITIN: CPT

## 2025-03-25 PROCEDURE — 82607 VITAMIN B-12: CPT

## 2025-04-07 ENCOUNTER — PATIENT MESSAGE (OUTPATIENT)
Dept: MEDICAL GROUP | Facility: MEDICAL CENTER | Age: 65
End: 2025-04-07
Payer: COMMERCIAL

## 2025-04-07 DIAGNOSIS — K21.9 GASTROESOPHAGEAL REFLUX DISEASE, UNSPECIFIED WHETHER ESOPHAGITIS PRESENT: ICD-10-CM

## 2025-04-07 NOTE — PATIENT COMMUNICATION
2. SPECIFIC Action To Be Taken: Orders pending, please sign.    3. Diagnosis/Clinical Reason for Request: GERD    4. Specialty & Provider Name/Lab/Imaging Location: GI    5. Is appointment scheduled for requested order/referral: no

## 2025-04-08 ENCOUNTER — OFFICE VISIT (OUTPATIENT)
Dept: MEDICAL GROUP | Facility: MEDICAL CENTER | Age: 65
End: 2025-04-08
Payer: COMMERCIAL

## 2025-04-08 VITALS
HEART RATE: 89 BPM | BODY MASS INDEX: 25.49 KG/M2 | OXYGEN SATURATION: 98 % | SYSTOLIC BLOOD PRESSURE: 106 MMHG | HEIGHT: 61 IN | DIASTOLIC BLOOD PRESSURE: 62 MMHG | WEIGHT: 135 LBS | TEMPERATURE: 96.7 F

## 2025-04-08 DIAGNOSIS — J30.9 CHRONIC ALLERGIC RHINITIS: ICD-10-CM

## 2025-04-08 DIAGNOSIS — M79.10 MUSCLE PAIN: ICD-10-CM

## 2025-04-08 DIAGNOSIS — R05.3 CHRONIC COUGH: ICD-10-CM

## 2025-04-08 DIAGNOSIS — R25.2 CRAMPS, EXTREMITY: ICD-10-CM

## 2025-04-08 DIAGNOSIS — E55.9 VITAMIN D DEFICIENCY: ICD-10-CM

## 2025-04-08 PROCEDURE — 3078F DIAST BP <80 MM HG: CPT | Performed by: STUDENT IN AN ORGANIZED HEALTH CARE EDUCATION/TRAINING PROGRAM

## 2025-04-08 PROCEDURE — 3074F SYST BP LT 130 MM HG: CPT | Performed by: STUDENT IN AN ORGANIZED HEALTH CARE EDUCATION/TRAINING PROGRAM

## 2025-04-08 PROCEDURE — 99214 OFFICE O/P EST MOD 30 MIN: CPT | Performed by: STUDENT IN AN ORGANIZED HEALTH CARE EDUCATION/TRAINING PROGRAM

## 2025-04-08 RX ORDER — BENZONATATE 100 MG/1
100 CAPSULE ORAL 3 TIMES DAILY PRN
Qty: 30 CAPSULE | Refills: 1 | Status: SHIPPED | OUTPATIENT
Start: 2025-04-08

## 2025-04-08 RX ORDER — ERGOCALCIFEROL 1.25 MG/1
50000 CAPSULE ORAL
Qty: 12 CAPSULE | Refills: 0 | Status: SHIPPED | OUTPATIENT
Start: 2025-04-08

## 2025-04-08 RX ORDER — MONTELUKAST SODIUM 10 MG/1
10 TABLET ORAL DAILY
Qty: 90 TABLET | Refills: 0 | Status: SHIPPED | OUTPATIENT
Start: 2025-04-08

## 2025-04-08 RX ORDER — METHYLPREDNISOLONE 4 MG/1
TABLET ORAL
Qty: 21 TABLET | Refills: 0 | Status: SHIPPED | OUTPATIENT
Start: 2025-04-08 | End: 2025-04-14

## 2025-04-08 ASSESSMENT — FIBROSIS 4 INDEX: FIB4 SCORE: 1.23

## 2025-04-10 NOTE — PROGRESS NOTES
Subjective:     CC:   Chief Complaint   Patient presents with    Cough     Persistent cough       HPI:   Jordyn presents today with  Verbal consent was acquired by the patient to use [employer] ambient listening note generation during this visit Yes   History of Present Illness  The patient presents for evaluation of a persistent cough, fatigue, and leg pain.    She has been experiencing a chronic cough for several months. The cough is triggered by various activities such as drinking tea, eating, and breathing, and occasionally becomes so severe that it impedes her ability to breathe. She reports minimal postnasal drip. She was previously prescribed an acid reducer, which provided no relief. A friend, who is a pain medicine specialist, suggested the cough might be allergy-related and prescribed steroids. This treatment alleviated the cough for 2 months, but it has since returned and is progressively worsening. She has not tried montelukast or Singulair. She manages the cough with lozenges, which provide temporary relief while in her mouth, but the cough returns upon eating, drinking, or talking. She also takes generic Claritin daily, which provides a few hours of relief.    She reports feeling fatigued by 11:00 AM, often without any apparent cause. She also experiences morning fatigue. Recent blood tests revealed 2 abnormal results, but she is uncertain if these could be contributing to her fatigue. She has a strong gag reflex when taking large pills. Her sleep quality is good when she takes alprazolam nightly, but poor otherwise. She does not snore, although she has been observed to do so on 1 or 2 occasions. She has tried Ambien, but it was ineffective. She took vitamin D for a few days but then stopped.    She experiences leg pain in the mornings, which she describes as similar to the sensation after running a marathon. She has discontinued baclofen as her cramps have ceased, but the leg pain persists. She was  "previously diagnosed with fibromyalgia and is considering a referral to a specialist once she obtains Medicare coverage. She typically manages the pain with Tylenol, but will take Advil if she has eaten. She reports a reaction to Advil when taken on an empty stomach. She experiences restless legs at night, primarily on the right side, but does not report any jerking movements. She also reports constant rubbing of her legs due to pain. She does not experience any tingling or numbness down the leg. Baclofen was effective for her cramps, but she is unsure if it helped with the pain.    MEDICATIONS  - Current: Generic version of Claritin, alprazolam, Tylenol, Advil  - Past: Ambien, baclofen    Results  Laboratory Studies  Hemoglobin is 12. Vitamin D is borderline. B12 was normal. Thyroid was normal.       All labs look good , except mild vit d def   B12 is high     Health Maintenance: Completed    ROS:  ROS    Review of systems unremarkable except for concerns noted by patient or items listed.    Please see HPI for additional ROS.      Objective:     Exam:  /62 (BP Location: Left arm, Patient Position: Sitting, BP Cuff Size: Adult)   Pulse 89   Temp 35.9 °C (96.7 °F) (Temporal)   Ht 1.549 m (5' 1\")   Wt 61.2 kg (135 lb)   SpO2 98%   BMI 25.51 kg/m²  Body mass index is 25.51 kg/m².    Physical Exam  Constitutional:       Appearance: Normal appearance.   HENT:      Head: Normocephalic.   Eyes:      General: No scleral icterus.  Cardiovascular:      Rate and Rhythm: Normal rate and regular rhythm.      Pulses: Normal pulses.      Heart sounds: Normal heart sounds.   Pulmonary:      Effort: Pulmonary effort is normal.      Breath sounds: Normal breath sounds.   Musculoskeletal:      Right lower leg: No edema.      Left lower leg: No edema.   Skin:     General: Skin is warm.   Neurological:      Mental Status: She is alert and oriented to person, place, and time.   Psychiatric:         Mood and Affect: Mood " normal.         Behavior: Behavior normal.           Labs: reviewed    Assessment & Plan:     64 y.o. female with the following -     1. Chronic allergic rhinitis  2. Chronic cough  Chronic, uncontrolled   - Persistent dry cough for many months  - Previous treatment with acid reducer was ineffective  - Steroids provided relief for 2 months, but cough returned and worsened over the past month  - Exacerbated by drinking tea, eating, and talking  - Prescribe Medrol Dosepak for short-term management  - Prescribe Singulair (montelukast) for underlying allergies    - montelukast (SINGULAIR) 10 MG Tab; Take 1 Tablet by mouth every day.  Dispense: 90 Tablet; Refill: 0  - methylPREDNISolone (MEDROL DOSEPAK) 4 MG Tablet Therapy Pack; 6 pills day 1, 5 pills day 2, 4 pills day 3, 3 pills day 4, 2 pills day 5, 1 pill day 6.  Dispense: 21 Tablet; Refill: 0  - benzonatate (TESSALON) 100 MG Cap; Take 1 Capsule by mouth 3 times a day as needed for Cough.  Dispense: 30 Capsule; Refill: 1    3. Vitamin D deficiency  Chronic ,low vit D  - Reports feeling exhausted by 11:00 AM and sometimes waking up tired  - Recent labs show low vitamin D levels  - Hemoglobin, electrolytes, A1c, cholesterol, B12, and thyroid levels are normal  - Prescribe high-dose vitamin D supplement (50,000 IU) once weekly for 3 months to improve energy levels    Plan:  - ergocalciferol (DRISDOL) 60422 UNIT capsule; Take 1 Capsule by mouth every 7 days.  Dispense: 12 Capsule; Refill: 0    4. Cramps, extremity    5. Muscle pain  Acute   - Experiences leg pain, particularly in the right leg, described as feeling like she has run a marathon  - History of muscle cramps, previously managed with baclofen  - Order lab test to check for muscle tissue breakdown  - Advise taking baclofen on alternate nights if needed for pain  - Avoid taking baclofen with alprazolam due to risk of drowsiness and potential falls  - CREATINE KINASE; Future              Please note that this  dictation was created using voice recognition software. I have made every reasonable attempt to correct obvious errors, but I expect that there are errors of grammar and possibly content that I did not discover before finalizing the note.

## 2025-05-11 ENCOUNTER — HOSPITAL ENCOUNTER (EMERGENCY)
Facility: MEDICAL CENTER | Age: 65
End: 2025-05-11
Attending: EMERGENCY MEDICINE
Payer: COMMERCIAL

## 2025-05-11 VITALS
TEMPERATURE: 97.3 F | WEIGHT: 132 LBS | HEART RATE: 88 BPM | OXYGEN SATURATION: 97 % | SYSTOLIC BLOOD PRESSURE: 109 MMHG | BODY MASS INDEX: 24.92 KG/M2 | RESPIRATION RATE: 18 BRPM | HEIGHT: 61 IN | DIASTOLIC BLOOD PRESSURE: 69 MMHG

## 2025-05-11 DIAGNOSIS — S51.812A LACERATION OF LEFT FOREARM, INITIAL ENCOUNTER: ICD-10-CM

## 2025-05-11 DIAGNOSIS — F43.0 ACUTE STRESS REACTION: ICD-10-CM

## 2025-05-11 PROCEDURE — 99283 EMERGENCY DEPT VISIT LOW MDM: CPT

## 2025-05-11 ASSESSMENT — FIBROSIS 4 INDEX: FIB4 SCORE: 1.23
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
## 2025-05-11 NOTE — ED NOTES
The patient has been provided with discharge education and information.  The patient was also provided with instructions on follow up care and return precautions.  The patient verbalizes understanding of discharge instructions, follow up care, and return precautions.  All questions have been answered.  No RX electronically prescribed to patient's preferred pharmacy.  NAD, A/Ox4, good color and appropriate at time of discharge.  Patient ambulated out of department with spouse.

## 2025-05-16 ENCOUNTER — APPOINTMENT (OUTPATIENT)
Dept: MEDICAL GROUP | Facility: MEDICAL CENTER | Age: 65
End: 2025-05-16
Payer: COMMERCIAL

## 2025-06-04 ENCOUNTER — HOSPITAL ENCOUNTER (OUTPATIENT)
Dept: RADIOLOGY | Facility: MEDICAL CENTER | Age: 65
End: 2025-06-04
Attending: STUDENT IN AN ORGANIZED HEALTH CARE EDUCATION/TRAINING PROGRAM
Payer: MEDICARE

## 2025-06-04 DIAGNOSIS — Z12.31 VISIT FOR SCREENING MAMMOGRAM: ICD-10-CM

## 2025-06-04 PROCEDURE — 77063 BREAST TOMOSYNTHESIS BI: CPT

## 2025-06-06 ENCOUNTER — APPOINTMENT (OUTPATIENT)
Dept: MEDICAL GROUP | Facility: MEDICAL CENTER | Age: 65
End: 2025-06-06
Payer: MEDICARE

## 2025-06-06 ENCOUNTER — HOSPITAL ENCOUNTER (OUTPATIENT)
Facility: MEDICAL CENTER | Age: 65
End: 2025-06-06
Attending: FAMILY MEDICINE
Payer: MEDICARE

## 2025-06-06 ENCOUNTER — OFFICE VISIT (OUTPATIENT)
Dept: MEDICAL GROUP | Facility: PHYSICIAN GROUP | Age: 65
End: 2025-06-06
Payer: MEDICARE

## 2025-06-06 VITALS
DIASTOLIC BLOOD PRESSURE: 58 MMHG | HEIGHT: 61 IN | TEMPERATURE: 98.1 F | BODY MASS INDEX: 25.64 KG/M2 | WEIGHT: 135.8 LBS | SYSTOLIC BLOOD PRESSURE: 100 MMHG | HEART RATE: 82 BPM

## 2025-06-06 DIAGNOSIS — F41.1 GENERALIZED ANXIETY DISORDER: ICD-10-CM

## 2025-06-06 DIAGNOSIS — Z79.899 CHRONIC USE OF BENZODIAZEPINE FOR THERAPEUTIC PURPOSE: ICD-10-CM

## 2025-06-06 DIAGNOSIS — Z78.0 POSTMENOPAUSAL: ICD-10-CM

## 2025-06-06 DIAGNOSIS — Z51.81 ENCOUNTER FOR MEDICATION MONITORING: ICD-10-CM

## 2025-06-06 DIAGNOSIS — F33.1 MODERATE EPISODE OF RECURRENT MAJOR DEPRESSIVE DISORDER (HCC): Primary | ICD-10-CM

## 2025-06-06 DIAGNOSIS — M85.80 OSTEOPENIA, UNSPECIFIED LOCATION: ICD-10-CM

## 2025-06-06 PROCEDURE — G0482 DRUG TEST DEF 15-21 CLASSES: HCPCS

## 2025-06-06 PROCEDURE — 3074F SYST BP LT 130 MM HG: CPT | Performed by: FAMILY MEDICINE

## 2025-06-06 PROCEDURE — 3078F DIAST BP <80 MM HG: CPT | Performed by: FAMILY MEDICINE

## 2025-06-06 PROCEDURE — 99214 OFFICE O/P EST MOD 30 MIN: CPT | Performed by: FAMILY MEDICINE

## 2025-06-06 RX ORDER — ALPRAZOLAM 0.5 MG
0.25 TABLET ORAL NIGHTLY PRN
Qty: 45 TABLET | Refills: 0 | Status: SHIPPED | OUTPATIENT
Start: 2025-06-06 | End: 2025-09-04

## 2025-06-06 RX ORDER — DULOXETIN HYDROCHLORIDE 30 MG/1
30 CAPSULE, DELAYED RELEASE ORAL DAILY
Qty: 90 CAPSULE | Refills: 0 | Status: SHIPPED | OUTPATIENT
Start: 2025-06-06 | End: 2025-06-24

## 2025-06-06 ASSESSMENT — FIBROSIS 4 INDEX: FIB4 SCORE: 1.25

## 2025-06-06 ASSESSMENT — PATIENT HEALTH QUESTIONNAIRE - PHQ9
CLINICAL INTERPRETATION OF PHQ2 SCORE: 3
5. POOR APPETITE OR OVEREATING: 0 - NOT AT ALL
SUM OF ALL RESPONSES TO PHQ QUESTIONS 1-9: 14

## 2025-06-06 NOTE — PROGRESS NOTES
Verbal consent was acquired by the patient to use [email or web address] ambient listening note generation during this visit     Subjective:     HPI:   History of Present Illness  The patient presents for evaluation of depression, anxiety, and osteopenia.    Depression  - She has been experiencing symptoms of depression for the past 10 to 12 years.  - Her depressive symptoms have been escalating over the past few months.  - She reports no thoughts of self-harm or harm to others.  - She has previously attempted treatment with Zoloft but discontinued due to nausea.  - Approximately 3 years ago, she was on duloxetine, which she found beneficial and did not experience any adverse effects.  - She discontinued duloxetine as her condition improved.  - She is seeking a referral for individual therapy and expresses a preference for a psychologist over a therapist.    Anxiety  - She reports difficulty sleeping without medication, necessitating the use of alprazolam 0.25 mg approximately 10 nights per month.  - She has been using alprazolam for the past 10 to 15 years and reports that it aids in sleep initiation.  - She typically resorts to over-the-counter sleep aids but uses alprazolam during particularly challenging nights.  - She last used alprazolam 3 to 4 days ago.  - She has observed a decline in her memory over the years, which she attributes to long-term use of alprazolam.  - She is nearing the end of her current supply of alprazolam and is requesting a refill.  - She reports no recent episodes of chest pain, respiratory distress, fevers, or chills.    Osteopenia  - She had a bone density scan 5.5 years ago, which showed osteopenia.    Supplemental information: She is on thyroid medication and Velcade.    Depression Screening    Little interest or pleasure in doing things?  1 - several days   Feeling down, depressed , or hopeless? 2 - more than half the days   Trouble falling or staying asleep, or sleeping too much?  3 - nearly  every day   Feeling tired or having little energy?  2 - more than half the days   Poor appetite or overeating?  0 - not at all   Feeling bad about yourself - or that you are a failure or have let yourself or your family down? 2 - more than half the days   Trouble concentrating on things, such as reading the newspaper or watching television? 2 - more than half the days   Moving or speaking so slowly that other people could have noticed.  Or the opposite - being so fidgety or restless that you have been moving around a lot more than usual?  0 - not at all   Thoughts that you would be better off dead, or of hurting yourself?  2 - more than half the days   Patient Health Questionnaire Score: 14       If depressive symptoms identified deferred to follow up visit unless specifically addressed in assesment and plan.    Interpretation of PHQ-9 Total Score   Score Severity   1-4 No Depression   5-9 Mild Depression   10-14 Moderate Depression   15-19 Moderately Severe Depression   20-27 Severe Depression    Alprazolam 0.5 mg  Last fill: 1/17/2025, #45, 90 days  Tabs left: 2 days  Last dose: 3-4 days ago  Rx on file: 0    Is the medication improving the patient’s symptoms: Yes  Any adverse effects: maybe - memory issues?    Alcohol or illicit drug use:   She  reports current alcohol use of about 0.6 oz of alcohol per week.  She  reports no history of drug use.     History of controlled substance used in a way other than prescribed? No  Any early refills of a controlled substance: No  History of lost or stolen controlled substance prescription: No  Any aberrant behavior or intoxication while on a controlled substance: No  Has the patient self-modified their dose or frequency of the medication :No  Compliant with treatment recommendations and plan: Yes  Any major health change to the patient: No  Concerns for misuse, abuse or addiction: No    /NarxCheck report reviewed: Yes  History of abnormal drug screening: No      Health  "Maintenance: Completed    Objective:     Exam:  /58 (BP Location: Left arm, Patient Position: Sitting, BP Cuff Size: Adult)   Pulse 82   Temp 36.7 °C (98.1 °F) (Temporal)   Ht 1.549 m (5' 1\")   Wt 61.6 kg (135 lb 12.8 oz)   BMI 25.66 kg/m²  Body mass index is 25.66 kg/m².    Physical Exam  Constitutional:       Appearance: Normal appearance.   Cardiovascular:      Rate and Rhythm: Normal rate and regular rhythm.      Heart sounds: Normal heart sounds.   Pulmonary:      Effort: Pulmonary effort is normal.      Breath sounds: Normal breath sounds.   Musculoskeletal:      Cervical back: Normal range of motion and neck supple.   Lymphadenopathy:      Cervical: No cervical adenopathy.   Neurological:      Mental Status: She is alert.             Results      Assessment & Plan:     1. Moderate episode of recurrent major depressive disorder (HCC)  Referral to Behavioral Health    CANCELED: Referral to Behavioral Health      2. Generalized anxiety disorder  Controlled Substance Treatment Agreement    Pain Management Screen    ALPRAZolam (XANAX) 0.5 MG Tab      3. Chronic use of benzodiazepine for therapeutic purpose  Pain Management Screen    ALPRAZolam (XANAX) 0.5 MG Tab      4. Encounter for medication monitoring  Pain Management Screen      5. Osteopenia, unspecified location  DS-BONE DENSITY STUDY (DEXA)      6. Postmenopausal  DS-BONE DENSITY STUDY (DEXA)          Assessment & Plan  1. Major depressive disorder: Chronic, recurrent, and acutely exacerbated, moderate in severity.  She has thoughts that she be better off dead but she denies any active suicidal ideation with plan and she also denies homicidal ideation.  - Treatment plan: Prescription for duloxetine 30 mg once daily will be initiated. If it induces significant drowsiness, advised to take it at night. Referral to a psychologist has been made. Once authorized by the insurance company, referral details will be available on [employer] for scheduling the " first appointment.    2. Generalized anxiety disorder with chronic use of benzodiazepine: Chronic, controlled. She reports alprazolam 0.25 mg nightly as needed continues to work well to manage symptoms without side effects.  We will continue the current regimen.  Informed consent and controlled substance treatment agreement for this provider obtained, which will  2025.  Urine drug screen obtained today.  Risks versus benefits were reviewed. Obtained and reviewed patient utilization report from [facility] pharmacy database on 2025 2:03 PM  prior to writing prescription for controlled substance II, III or IV per [place] bill . Based on assessment of the report, the prescription is medically necessary.     3. Osteopenia: Chronic. Previous scan showed osteopenia 5-1/2 years ago.  - Diagnostic plan: Repeat bone density scan will be ordered. Patient will call and schedule the scan at her convenience.      Referral for genetic research was offered. Patient is a participant.      Return if symptoms worsen or fail to improve.    Please note that this dictation was created using voice recognition software. I have made every reasonable attempt to correct obvious errors, but I expect that there are errors of grammar and possibly content that I did not discover before finalizing the note.

## 2025-06-09 ENCOUNTER — RESULTS FOLLOW-UP (OUTPATIENT)
Dept: MEDICAL GROUP | Facility: MEDICAL CENTER | Age: 65
End: 2025-06-09
Payer: MEDICARE

## 2025-06-11 ENCOUNTER — RESULTS FOLLOW-UP (OUTPATIENT)
Dept: MEDICAL GROUP | Facility: PHYSICIAN GROUP | Age: 65
End: 2025-06-11
Payer: MEDICARE

## 2025-06-11 LAB
1OH-MIDAZOLAM UR QL SCN: NOT DETECTED
6MAM UR QL: NOT DETECTED
7AMINOCLONAZEPAM UR QL: NOT DETECTED
A-OH ALPRAZ UR QL: NOT DETECTED
ALPRAZ UR QL: NOT DETECTED
AMPHET UR QL SCN: NOT DETECTED
ANNOTATION COMMENT IMP: NORMAL
BARBITURATES UR QL: NEGATIVE
BUPRENORPHINE UR QL: NOT DETECTED
BZE UR QL: NEGATIVE
CARBOXYTHC UR QL: NEGATIVE
CARISOPRODOL UR QL: NEGATIVE
CLONAZEPAM UR QL: NOT DETECTED
CODEINE UR QL: NOT DETECTED
CREAT UR-MCNC: 129.8 MG/DL (ref 20–400)
DIAZEPAM UR QL: NOT DETECTED
ETHYL GLUCURONIDE UR QL: NEGATIVE
FENTANYL UR QL: NOT DETECTED
GABAPENTIN UR QL CFM: NOT DETECTED
HYDROCODONE UR QL: NOT DETECTED
HYDROMORPHONE UR QL: NOT DETECTED
LORAZEPAM UR QL: NOT DETECTED
MDA UR QL: NOT DETECTED
MDEA UR QL: NOT DETECTED
MDMA UR QL: NOT DETECTED
ME-PHENIDATE UR QL: NOT DETECTED
METHADONE UR QL: NEGATIVE
METHAMPHET UR QL: NOT DETECTED
MIDAZOLAM UR QL SCN: NOT DETECTED
MORPHINE UR QL: NOT DETECTED
NALOXONE UR QL CFM: NOT DETECTED
NORBUPRENORPHINE UR QL CFM: NOT DETECTED
NORDIAZEPAM UR QL: NOT DETECTED
NORFENTANYL UR QL: NOT DETECTED
NORHYDROCODONE UR QL CFM: NOT DETECTED
NORMEPERIDINE UR QL CFM: NOT DETECTED
NOROXYCODONE UR QL CFM: NOT DETECTED
NOROXYMORPHONE UR QL SCN: NOT DETECTED
OXAZEPAM UR QL: NOT DETECTED
OXYCODONE UR QL: NOT DETECTED
OXYMORPHONE UR QL: NOT DETECTED
PATHOLOGY STUDY: NORMAL
PCP UR QL: NEGATIVE
PHENTERMINE UR QL: NOT DETECTED
PREGABALIN UR QL CFM: NOT DETECTED
SERVICE CMNT-IMP: NORMAL
TAPENTADOL UR QL SCN: NOT DETECTED
TAPENTADOL UR QL SCN: NOT DETECTED
TEMAZEPAM UR QL: NOT DETECTED
TRAMADOL UR QL: NEGATIVE
ZOLPIDEM PHENYL-4-CARB UR QL SCN: NOT DETECTED
ZOLPIDEM UR QL: NOT DETECTED

## 2025-06-16 NOTE — Clinical Note
REFERRAL APPROVAL NOTICE         Sent on June 16, 2025                   Elizabeth Vidal  [address] [place] [address]                   Dear Ms. Vidal,    After a careful review of the medical information and benefit coverage, [facility] has processed your referral. See below for additional details.    If applicable, you must be actively enrolled with your insurance for coverage of the authorized service. If you have any questions regarding your coverage, please contact your insurance directly.    REFERRAL INFORMATION   Referral #:  [id number]  Referred-To Provider    Referred-By Provider:  Behavioral Health    Alecia Orozco M.D.   [facility]      [address]  [place] [address] [phone] [address]  [place] [address]  [phone]    Referral Start Date:  06/06/2025  Referral End Date:   06/06/2026             SCHEDULING  If you do not already have an appointment, please call [phone] to make an appointment.     MORE INFORMATION  If you do not already have a [facility] account, sign up at: [email or web address]  You can access your medical information, make appointments, see lab results, billing information, and more.  If you have questions regarding this referral, please contact  the [facility] Referrals department at:             [phone]. Monday - Friday 8:00AM - 5:00PM.     Sincerely,    [facility] [place] [facility]

## 2025-06-18 ENCOUNTER — PATIENT MESSAGE (OUTPATIENT)
Dept: MEDICAL GROUP | Facility: PHYSICIAN GROUP | Age: 65
End: 2025-06-18
Payer: MEDICARE

## 2025-06-18 DIAGNOSIS — F33.1 MODERATE EPISODE OF RECURRENT MAJOR DEPRESSIVE DISORDER (HCC): Primary | ICD-10-CM

## 2025-06-24 RX ORDER — DULOXETIN HYDROCHLORIDE 20 MG/1
20 CAPSULE, DELAYED RELEASE ORAL DAILY
Qty: 30 CAPSULE | Refills: 0 | Status: SHIPPED | OUTPATIENT
Start: 2025-06-24 | End: 2025-07-02 | Stop reason: SDUPTHER

## 2025-06-30 ENCOUNTER — PATIENT MESSAGE (OUTPATIENT)
Dept: MEDICAL GROUP | Facility: MEDICAL CENTER | Age: 65
End: 2025-06-30
Payer: MEDICARE

## 2025-06-30 DIAGNOSIS — R25.2 CRAMPS, EXTREMITY: ICD-10-CM

## 2025-06-30 DIAGNOSIS — E89.0 POSTOPERATIVE HYPOTHYROIDISM: ICD-10-CM

## 2025-06-30 RX ORDER — BACLOFEN 5 MG/1
5 TABLET ORAL NIGHTLY PRN
Qty: 30 TABLET | Refills: 0 | Status: SHIPPED | OUTPATIENT
Start: 2025-06-30

## 2025-06-30 RX ORDER — LEVOTHYROXINE SODIUM 100 UG/1
100 TABLET ORAL
Qty: 90 TABLET | Refills: 0 | Status: SHIPPED | OUTPATIENT
Start: 2025-06-30

## 2025-06-30 NOTE — PATIENT COMMUNICATION
Was the patient seen in the last year in this department? Yes   Does patient have an active prescription for medications requested? No   Received Request Via: Patient

## 2025-07-02 ENCOUNTER — HOSPITAL ENCOUNTER (OUTPATIENT)
Dept: RADIOLOGY | Facility: MEDICAL CENTER | Age: 65
End: 2025-07-02
Attending: FAMILY MEDICINE
Payer: MEDICARE

## 2025-07-02 DIAGNOSIS — M85.80 OSTEOPENIA, UNSPECIFIED LOCATION: ICD-10-CM

## 2025-07-02 DIAGNOSIS — Z78.0 POSTMENOPAUSAL: ICD-10-CM

## 2025-07-02 PROCEDURE — 77080 DXA BONE DENSITY AXIAL: CPT

## 2025-07-02 RX ORDER — DULOXETIN HYDROCHLORIDE 20 MG/1
20 CAPSULE, DELAYED RELEASE ORAL DAILY
Qty: 30 CAPSULE | Refills: 2 | Status: SHIPPED | OUTPATIENT
Start: 2025-07-02

## 2025-08-05 DIAGNOSIS — R25.2 CRAMPS, EXTREMITY: ICD-10-CM

## 2025-08-05 DIAGNOSIS — E89.0 POSTOPERATIVE HYPOTHYROIDISM: ICD-10-CM

## 2025-08-06 RX ORDER — LEVOTHYROXINE SODIUM 100 UG/1
100 TABLET ORAL
Qty: 90 TABLET | Refills: 0 | Status: SHIPPED | OUTPATIENT
Start: 2025-08-06

## 2025-08-06 RX ORDER — BACLOFEN 5 MG/1
TABLET ORAL
Qty: 30 TABLET | Refills: 0 | Status: SHIPPED | OUTPATIENT
Start: 2025-08-06

## 2025-09-23 ENCOUNTER — APPOINTMENT (OUTPATIENT)
Dept: MEDICAL GROUP | Facility: MEDICAL CENTER | Age: 65
End: 2025-09-23
Payer: MEDICARE